# Patient Record
Sex: FEMALE | Race: WHITE | NOT HISPANIC OR LATINO
[De-identification: names, ages, dates, MRNs, and addresses within clinical notes are randomized per-mention and may not be internally consistent; named-entity substitution may affect disease eponyms.]

---

## 2016-03-02 RX ORDER — ACETAMINOPHEN 500 MG
2 TABLET ORAL
Qty: 0 | Refills: 0 | COMMUNITY
Start: 2016-03-02

## 2017-01-09 ENCOUNTER — APPOINTMENT (OUTPATIENT)
Dept: ORTHOPEDIC SURGERY | Facility: CLINIC | Age: 67
End: 2017-01-09

## 2017-01-13 ENCOUNTER — CHART COPY (OUTPATIENT)
Age: 67
End: 2017-01-13

## 2017-01-16 ENCOUNTER — TRANSCRIPTION ENCOUNTER (OUTPATIENT)
Age: 67
End: 2017-01-16

## 2017-01-27 ENCOUNTER — OUTPATIENT (OUTPATIENT)
Dept: OUTPATIENT SERVICES | Facility: HOSPITAL | Age: 67
LOS: 1 days | End: 2017-01-27
Payer: MEDICARE

## 2017-01-27 VITALS
SYSTOLIC BLOOD PRESSURE: 132 MMHG | OXYGEN SATURATION: 98 % | WEIGHT: 227.08 LBS | DIASTOLIC BLOOD PRESSURE: 88 MMHG | HEART RATE: 89 BPM | TEMPERATURE: 98 F | HEIGHT: 63.75 IN | RESPIRATION RATE: 14 BRPM

## 2017-01-27 DIAGNOSIS — Z01.818 ENCOUNTER FOR OTHER PREPROCEDURAL EXAMINATION: ICD-10-CM

## 2017-01-27 DIAGNOSIS — M25.562 PAIN IN LEFT KNEE: ICD-10-CM

## 2017-01-27 DIAGNOSIS — T84.018A BROKEN INTERNAL JOINT PROSTHESIS, OTHER SITE, INITIAL ENCOUNTER: ICD-10-CM

## 2017-01-27 DIAGNOSIS — M19.90 UNSPECIFIED OSTEOARTHRITIS, UNSPECIFIED SITE: ICD-10-CM

## 2017-01-27 DIAGNOSIS — Z96.653 PRESENCE OF ARTIFICIAL KNEE JOINT, BILATERAL: ICD-10-CM

## 2017-01-27 DIAGNOSIS — Z98.89 OTHER SPECIFIED POSTPROCEDURAL STATES: Chronic | ICD-10-CM

## 2017-01-27 DIAGNOSIS — Z96.659 PRESENCE OF UNSPECIFIED ARTIFICIAL KNEE JOINT: Chronic | ICD-10-CM

## 2017-01-27 LAB
ALBUMIN SERPL ELPH-MCNC: 3.8 G/DL — SIGNIFICANT CHANGE UP (ref 3.3–5)
ALP SERPL-CCNC: 73 U/L — SIGNIFICANT CHANGE UP (ref 30–120)
ALT FLD-CCNC: 7 U/L DA — LOW (ref 10–60)
ANION GAP SERPL CALC-SCNC: 3 MMOL/L — LOW (ref 5–17)
APTT BLD: 34.9 SEC — SIGNIFICANT CHANGE UP (ref 27.5–37.4)
AST SERPL-CCNC: 12 U/L — SIGNIFICANT CHANGE UP (ref 10–40)
BASOPHILS # BLD AUTO: 0.1 K/UL — SIGNIFICANT CHANGE UP (ref 0–0.2)
BASOPHILS NFR BLD AUTO: 0.8 % — SIGNIFICANT CHANGE UP (ref 0–2)
BILIRUB SERPL-MCNC: 0.3 MG/DL — SIGNIFICANT CHANGE UP (ref 0.2–1.2)
BLD GP AB SCN SERPL QL: SIGNIFICANT CHANGE UP
BUN SERPL-MCNC: 26 MG/DL — HIGH (ref 7–23)
CALCIUM SERPL-MCNC: 10.3 MG/DL — SIGNIFICANT CHANGE UP (ref 8.4–10.5)
CHLORIDE SERPL-SCNC: 103 MMOL/L — SIGNIFICANT CHANGE UP (ref 96–108)
CO2 SERPL-SCNC: 30 MMOL/L — SIGNIFICANT CHANGE UP (ref 22–31)
CREAT SERPL-MCNC: 1.14 MG/DL — SIGNIFICANT CHANGE UP (ref 0.5–1.3)
EOSINOPHIL # BLD AUTO: 0.1 K/UL — SIGNIFICANT CHANGE UP (ref 0–0.5)
EOSINOPHIL NFR BLD AUTO: 0.9 % — SIGNIFICANT CHANGE UP (ref 0–6)
GLUCOSE SERPL-MCNC: 98 MG/DL — SIGNIFICANT CHANGE UP (ref 70–99)
HCT VFR BLD CALC: 34 % — LOW (ref 34.5–45)
HGB BLD-MCNC: 11.5 G/DL — SIGNIFICANT CHANGE UP (ref 11.5–15.5)
INR BLD: 1.19 RATIO — HIGH (ref 0.88–1.16)
LYMPHOCYTES # BLD AUTO: 1.6 K/UL — SIGNIFICANT CHANGE UP (ref 1–3.3)
LYMPHOCYTES # BLD AUTO: 15.4 % — SIGNIFICANT CHANGE UP (ref 13–44)
MCHC RBC-ENTMCNC: 31.4 PG — SIGNIFICANT CHANGE UP (ref 27–34)
MCHC RBC-ENTMCNC: 33.7 GM/DL — SIGNIFICANT CHANGE UP (ref 32–36)
MCV RBC AUTO: 93.2 FL — SIGNIFICANT CHANGE UP (ref 80–100)
MONOCYTES # BLD AUTO: 0.7 K/UL — SIGNIFICANT CHANGE UP (ref 0–0.9)
MONOCYTES NFR BLD AUTO: 6.5 % — SIGNIFICANT CHANGE UP (ref 2–14)
MRSA PCR RESULT.: SIGNIFICANT CHANGE UP
NEUTROPHILS # BLD AUTO: 7.8 K/UL — HIGH (ref 1.8–7.4)
NEUTROPHILS NFR BLD AUTO: 76.4 % — SIGNIFICANT CHANGE UP (ref 43–77)
PLATELET # BLD AUTO: 355 K/UL — SIGNIFICANT CHANGE UP (ref 150–400)
POTASSIUM SERPL-MCNC: 4.5 MMOL/L — SIGNIFICANT CHANGE UP (ref 3.5–5.3)
POTASSIUM SERPL-SCNC: 4.5 MMOL/L — SIGNIFICANT CHANGE UP (ref 3.5–5.3)
PROT SERPL-MCNC: 8.1 G/DL — SIGNIFICANT CHANGE UP (ref 6–8.3)
PROTHROM AB SERPL-ACNC: 13.3 SEC — HIGH (ref 10–13.1)
RBC # BLD: 3.65 M/UL — LOW (ref 3.8–5.2)
RBC # FLD: 14 % — SIGNIFICANT CHANGE UP (ref 10.3–14.5)
S AUREUS DNA NOSE QL NAA+PROBE: SIGNIFICANT CHANGE UP
SODIUM SERPL-SCNC: 136 MMOL/L — SIGNIFICANT CHANGE UP (ref 135–145)
WBC # BLD: 10.3 K/UL — SIGNIFICANT CHANGE UP (ref 3.8–10.5)
WBC # FLD AUTO: 10.3 K/UL — SIGNIFICANT CHANGE UP (ref 3.8–10.5)

## 2017-01-27 PROCEDURE — 85730 THROMBOPLASTIN TIME PARTIAL: CPT

## 2017-01-27 PROCEDURE — 86900 BLOOD TYPING SEROLOGIC ABO: CPT

## 2017-01-27 PROCEDURE — G0463: CPT

## 2017-01-27 PROCEDURE — 85610 PROTHROMBIN TIME: CPT

## 2017-01-27 PROCEDURE — 87640 STAPH A DNA AMP PROBE: CPT

## 2017-01-27 PROCEDURE — 93010 ELECTROCARDIOGRAM REPORT: CPT | Mod: NC

## 2017-01-27 PROCEDURE — 86850 RBC ANTIBODY SCREEN: CPT

## 2017-01-27 PROCEDURE — 80053 COMPREHEN METABOLIC PANEL: CPT

## 2017-01-27 PROCEDURE — 85027 COMPLETE CBC AUTOMATED: CPT

## 2017-01-27 PROCEDURE — 86901 BLOOD TYPING SEROLOGIC RH(D): CPT

## 2017-01-27 PROCEDURE — 36415 COLL VENOUS BLD VENIPUNCTURE: CPT

## 2017-01-27 PROCEDURE — 93005 ELECTROCARDIOGRAM TRACING: CPT

## 2017-01-27 NOTE — H&P PST ADULT - HISTORY OF PRESENT ILLNESS
66 Y.o . female presents here w/ long standing hx. of left knee pain 10/10 had Left knee replacement in February 2016 and subsequent pain w/ stiffness had xray and mri and was told needed revision.  w/ decreased R.O.M., ambulation, mobility and ADL function. Seen by  and referred for surgery. P.T. no help.  Takes oxycodene for pain.

## 2017-01-27 NOTE — H&P PST ADULT - ASSESSMENT
NPO after Midnight. Take pepcid as ordered.  Nose cx instructions reviewed. Dietary instructions reviewed. 3day wipes reviewed. D/C instructions reviewed.  Patient advised of no jewelry day of surgery.  Handouts given.  Medication reviewed. Stop NSAIDS, ASA herbals, vit E per PMD instructions or 7 days prior to surgery .  Medical Clearance to be obtained.  CLYDE RITTER left knee  Anesthesia and Pharmacy consult done. Day of Surgery you can take the following meds with a small sip of water. Pramipexole, carbidopa, levothyroxine, oxycodone, tylenol

## 2017-01-27 NOTE — H&P PST ADULT - NSANTHOSAYNRD_GEN_A_CORE
No. EVERARDO screening performed.  STOP BANG Legend: 0-2 = LOW Risk; 3-4 = INTERMEDIATE Risk; 5-8 = HIGH Risk

## 2017-01-27 NOTE — H&P PST ADULT - PMH
Hyperlipidemia    Hypothyroid    Morbid obesity    Osteoarthritis    Parkinsons disease  diagnosed 5 years ago

## 2017-01-27 NOTE — H&P PST ADULT - FAMILY HISTORY
Father  Still living? Unknown  Family history of heart disease, Age at diagnosis: Age Unknown     Mother  Still living? No  Family history of heart disease, Age at diagnosis: Age Unknown

## 2017-01-27 NOTE — H&P PST ADULT - VISION (WITH CORRECTIVE LENSES IF THE PATIENT USUALLY WEARS THEM):
+ glasses/Partially impaired: cannot see medication labels or newsprint, but can see obstacles in path, and the surrounding layout; can count fingers at arm's length

## 2017-02-10 ENCOUNTER — CHART COPY (OUTPATIENT)
Age: 67
End: 2017-02-10

## 2017-02-13 RX ORDER — CEFAZOLIN SODIUM 1 G
2000 VIAL (EA) INJECTION ONCE
Qty: 0 | Refills: 0 | Status: COMPLETED | OUTPATIENT
Start: 2017-02-15 | End: 2017-02-15

## 2017-02-13 RX ORDER — SODIUM CHLORIDE 9 MG/ML
1000 INJECTION, SOLUTION INTRAVENOUS
Qty: 0 | Refills: 0 | Status: DISCONTINUED | OUTPATIENT
Start: 2017-02-15 | End: 2017-02-15

## 2017-02-14 ENCOUNTER — RESULT REVIEW (OUTPATIENT)
Age: 67
End: 2017-02-14

## 2017-02-14 RX ORDER — APREPITANT 80 MG/1
40 CAPSULE ORAL ONCE
Qty: 0 | Refills: 0 | Status: COMPLETED | OUTPATIENT
Start: 2017-02-15 | End: 2017-02-15

## 2017-02-14 RX ORDER — FAMOTIDINE 10 MG/ML
0 INJECTION INTRAVENOUS
Qty: 0 | Refills: 0 | COMMUNITY
Start: 2017-02-14 | End: 2017-02-15

## 2017-02-14 RX ORDER — ACETAMINOPHEN 500 MG
1000 TABLET ORAL ONCE
Qty: 0 | Refills: 0 | Status: COMPLETED | OUTPATIENT
Start: 2017-02-15 | End: 2017-02-15

## 2017-02-15 ENCOUNTER — TRANSCRIPTION ENCOUNTER (OUTPATIENT)
Age: 67
End: 2017-02-15

## 2017-02-15 ENCOUNTER — APPOINTMENT (OUTPATIENT)
Dept: ORTHOPEDIC SURGERY | Facility: HOSPITAL | Age: 67
End: 2017-02-15

## 2017-02-15 ENCOUNTER — INPATIENT (INPATIENT)
Facility: HOSPITAL | Age: 67
LOS: 6 days | Discharge: HOME HEALTH | DRG: 464 | End: 2017-02-22
Attending: ORTHOPAEDIC SURGERY | Admitting: ORTHOPAEDIC SURGERY
Payer: MEDICARE

## 2017-02-15 VITALS
WEIGHT: 223.99 LBS | SYSTOLIC BLOOD PRESSURE: 137 MMHG | TEMPERATURE: 98 F | OXYGEN SATURATION: 100 % | DIASTOLIC BLOOD PRESSURE: 72 MMHG | RESPIRATION RATE: 15 BRPM | HEART RATE: 95 BPM | HEIGHT: 63 IN

## 2017-02-15 DIAGNOSIS — Z98.89 OTHER SPECIFIED POSTPROCEDURAL STATES: Chronic | ICD-10-CM

## 2017-02-15 DIAGNOSIS — M25.562 PAIN IN LEFT KNEE: ICD-10-CM

## 2017-02-15 DIAGNOSIS — Z96.653 PRESENCE OF ARTIFICIAL KNEE JOINT, BILATERAL: ICD-10-CM

## 2017-02-15 DIAGNOSIS — Z96.659 PRESENCE OF UNSPECIFIED ARTIFICIAL KNEE JOINT: Chronic | ICD-10-CM

## 2017-02-15 LAB
HCT VFR BLD CALC: 32 % — LOW (ref 34.5–45)
HGB BLD-MCNC: 10.1 G/DL — LOW (ref 11.5–15.5)
MCHC RBC-ENTMCNC: 30.4 PG — SIGNIFICANT CHANGE UP (ref 27–34)
MCHC RBC-ENTMCNC: 31.6 GM/DL — LOW (ref 32–36)
MCV RBC AUTO: 96.5 FL — SIGNIFICANT CHANGE UP (ref 80–100)
PLATELET # BLD AUTO: 363 K/UL — SIGNIFICANT CHANGE UP (ref 150–400)
RBC # BLD: 3.32 M/UL — LOW (ref 3.8–5.2)
RBC # FLD: 13.8 % — SIGNIFICANT CHANGE UP (ref 10.3–14.5)
WBC # BLD: 13.7 K/UL — HIGH (ref 3.8–10.5)
WBC # FLD AUTO: 13.7 K/UL — HIGH (ref 3.8–10.5)

## 2017-02-15 PROCEDURE — 27488 REMOVAL OF KNEE PROSTHESIS: CPT | Mod: LT

## 2017-02-15 PROCEDURE — 88305 TISSUE EXAM BY PATHOLOGIST: CPT | Mod: 26

## 2017-02-15 PROCEDURE — 73562 X-RAY EXAM OF KNEE 3: CPT | Mod: 26,LT

## 2017-02-15 PROCEDURE — 88300 SURGICAL PATH GROSS: CPT | Mod: 26

## 2017-02-15 PROCEDURE — 99222 1ST HOSP IP/OBS MODERATE 55: CPT

## 2017-02-15 PROCEDURE — 27488 REMOVAL OF KNEE PROSTHESIS: CPT | Mod: 82,LT

## 2017-02-15 PROCEDURE — 88311 DECALCIFY TISSUE: CPT | Mod: 26

## 2017-02-15 PROCEDURE — 88108 CYTOPATH CONCENTRATE TECH: CPT | Mod: 26

## 2017-02-15 RX ORDER — HYDROMORPHONE HYDROCHLORIDE 2 MG/ML
0.5 INJECTION INTRAMUSCULAR; INTRAVENOUS; SUBCUTANEOUS
Qty: 0 | Refills: 0 | Status: DISCONTINUED | OUTPATIENT
Start: 2017-02-15 | End: 2017-02-15

## 2017-02-15 RX ORDER — CARBIDOPA AND LEVODOPA 25; 100 MG/1; MG/1
1 TABLET ORAL THREE TIMES A DAY
Qty: 0 | Refills: 0 | Status: DISCONTINUED | OUTPATIENT
Start: 2017-02-15 | End: 2017-02-15

## 2017-02-15 RX ORDER — SODIUM CHLORIDE 9 MG/ML
1000 INJECTION, SOLUTION INTRAVENOUS
Qty: 0 | Refills: 0 | Status: DISCONTINUED | OUTPATIENT
Start: 2017-02-15 | End: 2017-02-15

## 2017-02-15 RX ORDER — CELECOXIB 200 MG/1
200 CAPSULE ORAL ONCE
Qty: 0 | Refills: 0 | Status: COMPLETED | OUTPATIENT
Start: 2017-02-15 | End: 2017-02-15

## 2017-02-15 RX ORDER — APIXABAN 2.5 MG/1
2.5 TABLET, FILM COATED ORAL ONCE
Qty: 0 | Refills: 0 | Status: COMPLETED | OUTPATIENT
Start: 2017-02-16 | End: 2017-02-16

## 2017-02-15 RX ORDER — POLYETHYLENE GLYCOL 3350 17 G/17G
17 POWDER, FOR SOLUTION ORAL DAILY
Qty: 0 | Refills: 0 | Status: DISCONTINUED | OUTPATIENT
Start: 2017-02-15 | End: 2017-02-22

## 2017-02-15 RX ORDER — SENNA PLUS 8.6 MG/1
2 TABLET ORAL AT BEDTIME
Qty: 0 | Refills: 0 | Status: DISCONTINUED | OUTPATIENT
Start: 2017-02-15 | End: 2017-02-22

## 2017-02-15 RX ORDER — OXYCODONE HYDROCHLORIDE 5 MG/1
10 TABLET ORAL
Qty: 0 | Refills: 0 | Status: DISCONTINUED | OUTPATIENT
Start: 2017-02-15 | End: 2017-02-20

## 2017-02-15 RX ORDER — OXYCODONE HYDROCHLORIDE 5 MG/1
10 TABLET ORAL ONCE
Qty: 0 | Refills: 0 | Status: DISCONTINUED | OUTPATIENT
Start: 2017-02-15 | End: 2017-02-15

## 2017-02-15 RX ORDER — LEVOTHYROXINE SODIUM 125 MCG
125 TABLET ORAL DAILY
Qty: 0 | Refills: 0 | Status: DISCONTINUED | OUTPATIENT
Start: 2017-02-15 | End: 2017-02-15

## 2017-02-15 RX ORDER — OXYCODONE HYDROCHLORIDE 5 MG/1
5 TABLET ORAL
Qty: 0 | Refills: 0 | Status: DISCONTINUED | OUTPATIENT
Start: 2017-02-15 | End: 2017-02-22

## 2017-02-15 RX ORDER — ACETAMINOPHEN 500 MG
1000 TABLET ORAL EVERY 8 HOURS
Qty: 0 | Refills: 0 | Status: DISCONTINUED | OUTPATIENT
Start: 2017-02-16 | End: 2017-02-22

## 2017-02-15 RX ORDER — ACETAMINOPHEN 500 MG
1000 TABLET ORAL EVERY 6 HOURS
Qty: 0 | Refills: 0 | Status: COMPLETED | OUTPATIENT
Start: 2017-02-15 | End: 2017-02-16

## 2017-02-15 RX ORDER — DOCUSATE SODIUM 100 MG
100 CAPSULE ORAL THREE TIMES A DAY
Qty: 0 | Refills: 0 | Status: DISCONTINUED | OUTPATIENT
Start: 2017-02-15 | End: 2017-02-15

## 2017-02-15 RX ORDER — APIXABAN 2.5 MG/1
2.5 TABLET, FILM COATED ORAL EVERY 12 HOURS
Qty: 0 | Refills: 0 | Status: DISCONTINUED | OUTPATIENT
Start: 2017-02-17 | End: 2017-02-22

## 2017-02-15 RX ORDER — PRAMIPEXOLE DIHYDROCHLORIDE 0.12 MG/1
0.25 TABLET ORAL THREE TIMES A DAY
Qty: 0 | Refills: 0 | Status: DISCONTINUED | OUTPATIENT
Start: 2017-02-15 | End: 2017-02-22

## 2017-02-15 RX ORDER — EZETIMIBE 10 MG/1
10 TABLET ORAL AT BEDTIME
Qty: 0 | Refills: 0 | Status: DISCONTINUED | OUTPATIENT
Start: 2017-02-15 | End: 2017-02-22

## 2017-02-15 RX ORDER — LEVOTHYROXINE SODIUM 125 MCG
125 TABLET ORAL DAILY
Qty: 0 | Refills: 0 | Status: DISCONTINUED | OUTPATIENT
Start: 2017-02-15 | End: 2017-02-22

## 2017-02-15 RX ORDER — MAGNESIUM HYDROXIDE 400 MG/1
30 TABLET, CHEWABLE ORAL DAILY
Qty: 0 | Refills: 0 | Status: DISCONTINUED | OUTPATIENT
Start: 2017-02-15 | End: 2017-02-22

## 2017-02-15 RX ORDER — TRANEXAMIC ACID 100 MG/ML
1000 INJECTION, SOLUTION INTRAVENOUS ONCE
Qty: 0 | Refills: 0 | Status: DISCONTINUED | OUTPATIENT
Start: 2017-02-15 | End: 2017-02-15

## 2017-02-15 RX ORDER — CEFTRIAXONE 500 MG/1
1 INJECTION, POWDER, FOR SOLUTION INTRAMUSCULAR; INTRAVENOUS EVERY 24 HOURS
Qty: 0 | Refills: 0 | Status: DISCONTINUED | OUTPATIENT
Start: 2017-02-15 | End: 2017-02-19

## 2017-02-15 RX ORDER — VANCOMYCIN HCL 1 G
1500 VIAL (EA) INTRAVENOUS EVERY 12 HOURS
Qty: 0 | Refills: 0 | Status: DISCONTINUED | OUTPATIENT
Start: 2017-02-16 | End: 2017-02-19

## 2017-02-15 RX ORDER — ONDANSETRON 8 MG/1
4 TABLET, FILM COATED ORAL EVERY 6 HOURS
Qty: 0 | Refills: 0 | Status: DISCONTINUED | OUTPATIENT
Start: 2017-02-15 | End: 2017-02-22

## 2017-02-15 RX ORDER — PANTOPRAZOLE SODIUM 20 MG/1
40 TABLET, DELAYED RELEASE ORAL DAILY
Qty: 0 | Refills: 0 | Status: DISCONTINUED | OUTPATIENT
Start: 2017-02-15 | End: 2017-02-22

## 2017-02-15 RX ORDER — DOCUSATE SODIUM 100 MG
100 CAPSULE ORAL THREE TIMES A DAY
Qty: 0 | Refills: 0 | Status: DISCONTINUED | OUTPATIENT
Start: 2017-02-15 | End: 2017-02-22

## 2017-02-15 RX ORDER — HYDROMORPHONE HYDROCHLORIDE 2 MG/ML
1 INJECTION INTRAMUSCULAR; INTRAVENOUS; SUBCUTANEOUS
Qty: 0 | Refills: 0 | Status: DISCONTINUED | OUTPATIENT
Start: 2017-02-15 | End: 2017-02-15

## 2017-02-15 RX ORDER — SODIUM CHLORIDE 9 MG/ML
1000 INJECTION, SOLUTION INTRAVENOUS
Qty: 0 | Refills: 0 | Status: DISCONTINUED | OUTPATIENT
Start: 2017-02-15 | End: 2017-02-17

## 2017-02-15 RX ORDER — VANCOMYCIN HCL 1 G
1500 VIAL (EA) INTRAVENOUS EVERY 12 HOURS
Qty: 0 | Refills: 0 | Status: COMPLETED | OUTPATIENT
Start: 2017-02-15 | End: 2017-02-15

## 2017-02-15 RX ORDER — ONDANSETRON 8 MG/1
4 TABLET, FILM COATED ORAL ONCE
Qty: 0 | Refills: 0 | Status: DISCONTINUED | OUTPATIENT
Start: 2017-02-15 | End: 2017-02-15

## 2017-02-15 RX ORDER — CARBIDOPA AND LEVODOPA 25; 100 MG/1; MG/1
1 TABLET ORAL THREE TIMES A DAY
Qty: 0 | Refills: 0 | Status: DISCONTINUED | OUTPATIENT
Start: 2017-02-15 | End: 2017-02-22

## 2017-02-15 RX ADMIN — CEFTRIAXONE 100 GRAM(S): 500 INJECTION, POWDER, FOR SOLUTION INTRAMUSCULAR; INTRAVENOUS at 20:20

## 2017-02-15 RX ADMIN — HYDROMORPHONE HYDROCHLORIDE 0.5 MILLIGRAM(S): 2 INJECTION INTRAMUSCULAR; INTRAVENOUS; SUBCUTANEOUS at 23:30

## 2017-02-15 RX ADMIN — SODIUM CHLORIDE 75 MILLILITER(S): 9 INJECTION, SOLUTION INTRAVENOUS at 10:41

## 2017-02-15 RX ADMIN — HYDROMORPHONE HYDROCHLORIDE 0.5 MILLIGRAM(S): 2 INJECTION INTRAMUSCULAR; INTRAVENOUS; SUBCUTANEOUS at 23:15

## 2017-02-15 RX ADMIN — OXYCODONE HYDROCHLORIDE 10 MILLIGRAM(S): 5 TABLET ORAL at 11:21

## 2017-02-15 RX ADMIN — Medication 300 MILLIGRAM(S): at 17:57

## 2017-02-15 RX ADMIN — Medication 400 MILLIGRAM(S): at 20:24

## 2017-02-15 RX ADMIN — CELECOXIB 200 MILLIGRAM(S): 200 CAPSULE ORAL at 11:22

## 2017-02-15 RX ADMIN — CARBIDOPA AND LEVODOPA 1 TABLET(S): 25; 100 TABLET ORAL at 22:15

## 2017-02-15 RX ADMIN — HYDROMORPHONE HYDROCHLORIDE 1 MILLIGRAM(S): 2 INJECTION INTRAMUSCULAR; INTRAVENOUS; SUBCUTANEOUS at 19:00

## 2017-02-15 RX ADMIN — PRAMIPEXOLE DIHYDROCHLORIDE 0.25 MILLIGRAM(S): 0.12 TABLET ORAL at 22:15

## 2017-02-15 RX ADMIN — APREPITANT 40 MILLIGRAM(S): 80 CAPSULE ORAL at 11:22

## 2017-02-15 RX ADMIN — Medication 1000 MILLIGRAM(S): at 20:20

## 2017-02-15 RX ADMIN — HYDROMORPHONE HYDROCHLORIDE 1 MILLIGRAM(S): 2 INJECTION INTRAMUSCULAR; INTRAVENOUS; SUBCUTANEOUS at 18:35

## 2017-02-15 RX ADMIN — Medication 100 MILLIGRAM(S): at 22:15

## 2017-02-15 RX ADMIN — SODIUM CHLORIDE 100 MILLILITER(S): 9 INJECTION, SOLUTION INTRAVENOUS at 17:00

## 2017-02-15 NOTE — DISCHARGE NOTE ADULT - ADDITIONAL INSTRUCTIONS
Follow up with Dr. Kaur on 3/7/17 at 16:00  Follow up with Dr. Rodriguez in 3/4 weeks   Follow up with PCP 2-3 weeks

## 2017-02-15 NOTE — DISCHARGE NOTE ADULT - FUNCTIONAL SCREEN CURRENT LEVEL: AMBULATION, MLM
(1) assistive equipment (4) completely dependent (3) assistive equipment and person (2) assistive person

## 2017-02-15 NOTE — DISCHARGE NOTE ADULT - MEDICATION SUMMARY - MEDICATIONS TO TAKE
I will START or STAY ON the medications listed below when I get home from the hospital:    acetaminophen 500 mg oral tablet  -- 2 tab(s) by mouth every 8 hours  -- Indication: For Pain    oxyCODONE 5 mg oral tablet  -- 1 tab(s) by mouth every 4 hours, As Needed, Mild-mod Pain; 2 tabs by mouth every 4 hours as needed mod-severe pain MDD:6  -- Indication: For Pain    apixaban 2.5 mg oral tablet  -- 1 tab(s) by mouth every 12 hours  -- Indication: For Prevention of blood clots    Zetia 10 mg oral tablet  -- 1 tab(s) by mouth once a day  -- Indication: For high cholesterol     pramipexole 0.25 mg oral tablet  -- 1 tab(s) by mouth 3 times a day  -- Indication: For Parkinson's Disease    carbidopa-levodopa 25 mg-100 mg oral tablet  -- 1 tab(s) by mouth 3 times a day  -- Indication: For Parkinson's Disease    hydrochlorothiazide 25 mg oral tablet  -- 1 tab(s) by mouth once a day  -- Indication: For high blood pressure    docusate sodium 100 mg oral capsule  -- 1 cap(s) by mouth 3 times a day  -- Indication: For stool softener    senna oral tablet  -- 2 tab(s) by mouth once a day (at bedtime), As needed, Constipation  -- Indication: For constipation    polyethylene glycol 3350 oral powder for reconstitution  -- 17 gram(s) by mouth once a day  -- Indication: For constipation    nafcillin 2 g injectable powder for injection  -- 2g IVPB Q4H via CAD pump or similar; stop date 3/29/17;  flushes per protocol; CBC/DIFF CMP on 2/27, 3/6, 3/13, 3/20, 3/27/17  -- Indication: For Infection     lactobacillus acidophilus oral capsule  -- 1  by mouth 3 times a day  -- Indication: For Prevention of GI infection    pantoprazole 40 mg oral delayed release tablet  -- 1 tab(s) by mouth once a day  -- Indication: For Prevention of ulcer    levothyroxine 125 mcg (0.125 mg) oral tablet  -- 1 tab(s) by mouth once a day  -- Indication: For thyroid    levothyroxine 125 mcg (0.125 mg) oral capsule  -- 1 cap(s) by mouth once a day  -- Indication: For thyroid    Vitamin D3 2000 intl units oral capsule  -- 1 cap(s) by mouth once a day  -- Indication: For supplement I will START or STAY ON the medications listed below when I get home from the hospital:    acetaminophen 500 mg oral tablet  -- 2 tab(s) by mouth every 8 hours  -- Indication: For Pain    oxyCODONE 5 mg oral tablet  -- 1 tab(s) by mouth every 4 hours, As Needed, Mild-mod Pain; 2 tabs by mouth every 4 hours as needed mod-severe pain MDD:6  -- Indication: For Pain    Ecotrin 325 mg oral delayed release tablet  -- 1 tab(s) by mouth 2 times a day; start after Eliquis is completed  -- Swallow whole.  Do not crush.  Take with food or milk.    -- Indication: For Proph    apixaban 2.5 mg oral tablet  -- 1 tab(s) by mouth every 12 hours  -- Indication: For Prevention of blood clots    Zetia 10 mg oral tablet  -- 1 tab(s) by mouth once a day  -- Indication: For high cholesterol     pramipexole 0.25 mg oral tablet  -- 1 tab(s) by mouth 3 times a day  -- Indication: For Parkinson's Disease    carbidopa-levodopa 25 mg-100 mg oral tablet  -- 1 tab(s) by mouth 3 times a day  -- Indication: For Parkinson's Disease    hydrochlorothiazide 25 mg oral tablet  -- 1 tab(s) by mouth once a day  -- Indication: For high blood pressure    docusate sodium 100 mg oral capsule  -- 1 cap(s) by mouth 3 times a day  -- Indication: For stool softener    senna oral tablet  -- 2 tab(s) by mouth once a day (at bedtime), As needed, Constipation  -- Indication: For constipation    polyethylene glycol 3350 oral powder for reconstitution  -- 17 gram(s) by mouth once a day  -- Indication: For constipation    nafcillin 2 g injectable powder for injection  -- 2g IVPB Q4H via CAD pump or similar; stop date 3/29/17;  flushes per protocol; CBC/DIFF CMP on 2/27, 3/6, 3/13, 3/20, 3/27/17  -- Indication: For Infection     lactobacillus acidophilus oral capsule  -- 1  by mouth 3 times a day  -- Indication: For Prevention of GI infection    pantoprazole 40 mg oral delayed release tablet  -- 1 tab(s) by mouth once a day  -- Indication: For Prevention of ulcer    levothyroxine 125 mcg (0.125 mg) oral capsule  -- 1 cap(s) by mouth once a day  -- Indication: For thyroid    Vitamin D3 2000 intl units oral capsule  -- 1 cap(s) by mouth once a day  -- Indication: For supplement

## 2017-02-15 NOTE — DISCHARGE NOTE ADULT - HOME CARE AGENCY
[facility] [place] [phone] for skilled nurse; PT and aide eval; [facility] [phone] for IV ABX to start at 6PM today.

## 2017-02-15 NOTE — PHYSICAL THERAPY INITIAL EVALUATION ADULT - RANGE OF MOTION EXAMINATION, REHAB EVAL
L knee NT due to knee immobilizer/Right LE ROM was WFL (within functional limits)/bilateral upper extremity ROM was WFL (within functional limits)/deficits as listed below

## 2017-02-15 NOTE — DISCHARGE NOTE ADULT - MEDICATION SUMMARY - MEDICATIONS TO STOP TAKING
I will STOP taking the medications listed below when I get home from the hospital:    Fish Oil 1200 mg oral capsule  --  by mouth once a day    glucosamine hydrochloride 1500 mg oral tablet  -- 1 tab(s) by mouth once a day    Pepcid 20 mg oral tablet  --  by mouth twice pre op

## 2017-02-15 NOTE — PHYSICAL THERAPY INITIAL EVALUATION ADULT - ADDITIONAL COMMENTS
Pt lives with  in a house w/ 1 step to enter and no steps inside.   Pt has a rolling walker and straight cane

## 2017-02-15 NOTE — DISCHARGE NOTE ADULT - CARE PLAN
Principal Discharge DX:	Infection of bone of lower leg  Goal:	Improve ambulation, ADLs and quality of life  Instructions for follow-up, activity and diet:	Physical Therapy/Occupational Therapy for ambulation, transfers, stairs, ADL's, Range of Motion Exercises, Isometrics.  Full weight bearing as tolerated with rolling walker  Range of Motion Goals: Keep knee in the bled wu brace locked in extension   Keep incision clean and dry.  Suture/prineo dressing removal 14 days after surgery at rehab facility or Surgeon's office  May shower post-op day #5 if no drainage from incision  Goal:	knee infection  Instructions for follow-up, activity and diet:	Continue IV Nafcillin until 3/29/17 via PICC line   Obtain weekly, CBC, BMP  Follow up with  in 3-4 weeks.

## 2017-02-15 NOTE — PHYSICAL THERAPY INITIAL EVALUATION ADULT - GAIT TRAINING, PT EVAL
Goals 3-5 sessions, Pt will ambulate 75 ft w/ rolling walker and LLE WBAT in knee immobilizer independently

## 2017-02-15 NOTE — DISCHARGE NOTE ADULT - INSTRUCTIONS
For Constipation :   • Increase your water intake. Drink at least 8 glasses of water daily.  • Try adding fiber to your diet by eating fruits, vegetables and foods that are rich in grains.  • If you do experience constipation, you may take an over-the-counter stool softener/laxative such as Esther Colace, Senekot or  Milk of Magnesia.

## 2017-02-15 NOTE — DISCHARGE NOTE ADULT - NS AS ACTIVITY OBS
No Heavy lifting/straining/Walking-Indoors allowed/Do not drive or operate machinery/Walking-Outdoors allowed

## 2017-02-15 NOTE — DISCHARGE NOTE ADULT - CARE PROVIDERS DIRECT ADDRESSES
,DirectAddress_Unknown,DirectAddress_Unknown,[email or web address]

## 2017-02-15 NOTE — DISCHARGE NOTE ADULT - PATIENT PORTAL LINK FT
“You can access the FollowHealth Patient Portal, offered by [facility], by registering with the following website: [email or web address]”

## 2017-02-15 NOTE — DISCHARGE NOTE ADULT - PROVIDER TOKENS
TOKEN:'[id number]',FREE:[LAST:[Mina Kaur MD],PHONE:[(   )    -],FAX:[(   )    -],ADDRESS:[Mina Kaur MD  [address] [place] [address],   [facility] [address]  [place] [address]  Tel: [phone]  Fax: [phone]]]

## 2017-02-15 NOTE — DISCHARGE NOTE ADULT - VISION (WITH CORRECTIVE LENSES IF THE PATIENT USUALLY WEARS THEM):
Partially impaired: cannot see medication labels or newsprint, but can see obstacles in path, and the surrounding layout; can count fingers at arm's length/+ glasses

## 2017-02-15 NOTE — DISCHARGE NOTE ADULT - CARE PROVIDER_API CALL
Sergio Rodriguez (MD), Infectious Disease; Internal Medicine  [address] [facility] [address]  [place] [address]  Phone: [phone]  Fax: [phone]    Mina Kaur MD,   Mina Kaur MD  [address] [facility],   [address]  [place] [address]  Tel: [phone]  Fax: [phone]  Phone: (   )    -  Fax: (   )    -

## 2017-02-15 NOTE — DISCHARGE NOTE ADULT - PLAN OF CARE
Improve ambulation, ADLs and quality of life Physical Therapy/Occupational Therapy for ambulation, transfers, stairs, ADL's, Range of Motion Exercises, Isometrics.  Full weight bearing as tolerated with rolling walker  Range of Motion Goals: Keep knee in the bled wu brace locked in extension   Keep incision clean and dry.  Suture/prineo dressing removal 14 days after surgery at rehab facility or Surgeon's office  May shower post-op day #5 if no drainage from incision knee infection Continue IV Nafcillin until 3/29/17 via PICC line   Obtain weekly, CBC, BMP  Follow up with  in 3-4 weeks.

## 2017-02-15 NOTE — DISCHARGE NOTE ADULT - HOSPITAL COURSE
This patient was admitted to [facility] with a history of failed Left TKR.  Patient went to Pre-Surgical Testing at [facility] and was medically cleared to undergo elective procedure. Patient was found to have knee joint infection, all hardware was removal and antibiotic space placed. Intra-Op cultures send out for pathology. Dr.Zelenetz BARAKAT was consulted and followed the patient through out hospital stay.  Surgical cultures grew out staph lugdunensis, sensitive to nafcillin. PICC line was inserted on 3/19/17 to arm arm.  No operative or karis-operative complications arose during patients hospital course.  Patient received antibiotic according to SCIP guidelines for infection prevention. Eliquis was given for DVT prophylaxis.  Anesthesia, Medical Hospitalist, Physical Therapy and Occupational Therapy were consulted. Patient is weight bearing as tolerated with a knee extension brace locked in extension until advised otherwise by Dr. Kaur.  Patient is stable for discharge with a good prognosis.  Appropriate discharge instructions and medications are provided in this document. This patient was admitted to [facility] with a history of failed Left TKR.  Patient went to Pre-Surgical Testing at [facility] and was medically cleared to undergo elective procedure. Patient was found to have knee joint infection, all hardware was removal and antibiotic space placed. Intra-Op cultures send out for pathology. Dr.Zelenetz BARAKAT was consulted and followed the patient through out hospital stay.  Surgical cultures grew out staph lugdunensis, sensitive to nafcillin. PICC line was inserted on 3/19/17 to arm.  No operative or karis-operative complications arose during patients hospital course.  Patient received antibiotic according to SCIP guidelines for infection prevention. Eliquis was given for DVT prophylaxis.  Anesthesia, Medical Hospitalist, Physical Therapy and Occupational Therapy were consulted. Patient is weight bearing as tolerated with a knee extension brace locked in extension until advised otherwise by Dr. Kaur.  Patient is stable for discharge with a good prognosis.  Appropriate discharge instructions and medications are provided in this document. This patient was admitted to [facility] with a history of failed Left TKR.  Patient went to Pre-Surgical Testing at [facility] and was medically cleared to undergo elective procedure. Patient was found to have knee joint infection, all hardware was removal and antibiotic space placed. Intra-Op cultures send out for pathology. Dr.Zelenetz BARAKAT was consulted and followed the patient through out hospital stay.  Surgical cultures grew out staph lugdunensis, sensitive to nafcillin. PICC line was inserted on 3/19/17 to arm.  No operative or karis-operative complications arose during patients hospital course.  Patient received antibiotic according to SCIP guidelines for infection prevention. Eliquis was given for DVT prophylaxis.  Anesthesia, Medical Hospitalist, Physical Therapy and Occupational Therapy were consulted. Patient is weight bearing as tolerated with a knee extension brace locked in extension until advised otherwise by Dr. Kaur.  Patient is stable for discharge with a good prognosis.  Appropriate discharge instructions and medications are provided in this document. Patient needs weekly cbc, and CMP

## 2017-02-15 NOTE — PHYSICAL THERAPY INITIAL EVALUATION ADULT - GAIT DEVIATIONS NOTED, PT EVAL
decreased stride length/decreased weight-shifting ability/decreased sam/decreased step length/decreased velocity of limb motion

## 2017-02-15 NOTE — DISCHARGE NOTE ADULT - REASON FOR ADMISSION
Revision of Left Total Knee Replacement patella with tibial bearing exchange Left knee removal of hardware, antibiotic spacer placement

## 2017-02-16 LAB
ANION GAP SERPL CALC-SCNC: 7 MMOL/L — SIGNIFICANT CHANGE UP (ref 5–17)
BUN SERPL-MCNC: 16 MG/DL — SIGNIFICANT CHANGE UP (ref 7–23)
CALCIUM SERPL-MCNC: 9.6 MG/DL — SIGNIFICANT CHANGE UP (ref 8.4–10.5)
CHLORIDE SERPL-SCNC: 103 MMOL/L — SIGNIFICANT CHANGE UP (ref 96–108)
CO2 SERPL-SCNC: 31 MMOL/L — SIGNIFICANT CHANGE UP (ref 22–31)
CREAT SERPL-MCNC: 0.97 MG/DL — SIGNIFICANT CHANGE UP (ref 0.5–1.3)
GLUCOSE SERPL-MCNC: 108 MG/DL — HIGH (ref 70–99)
HCT VFR BLD CALC: 30.9 % — LOW (ref 34.5–45)
HGB BLD-MCNC: 10 G/DL — LOW (ref 11.5–15.5)
MCHC RBC-ENTMCNC: 32 PG — SIGNIFICANT CHANGE UP (ref 27–34)
MCHC RBC-ENTMCNC: 32.5 GM/DL — SIGNIFICANT CHANGE UP (ref 32–36)
MCV RBC AUTO: 98.2 FL — SIGNIFICANT CHANGE UP (ref 80–100)
PLATELET # BLD AUTO: 346 K/UL — SIGNIFICANT CHANGE UP (ref 150–400)
POTASSIUM SERPL-MCNC: 3.7 MMOL/L — SIGNIFICANT CHANGE UP (ref 3.5–5.3)
POTASSIUM SERPL-SCNC: 3.7 MMOL/L — SIGNIFICANT CHANGE UP (ref 3.5–5.3)
RBC # BLD: 3.14 M/UL — LOW (ref 3.8–5.2)
RBC # FLD: 13.8 % — SIGNIFICANT CHANGE UP (ref 10.3–14.5)
SODIUM SERPL-SCNC: 141 MMOL/L — SIGNIFICANT CHANGE UP (ref 135–145)
WBC # BLD: 10.2 K/UL — SIGNIFICANT CHANGE UP (ref 3.8–10.5)
WBC # FLD AUTO: 10.2 K/UL — SIGNIFICANT CHANGE UP (ref 3.8–10.5)

## 2017-02-16 PROCEDURE — 74000: CPT | Mod: 26

## 2017-02-16 PROCEDURE — 99233 SBSQ HOSP IP/OBS HIGH 50: CPT

## 2017-02-16 RX ORDER — DIPHENHYDRAMINE HCL 50 MG
25 CAPSULE ORAL ONCE
Qty: 0 | Refills: 0 | Status: COMPLETED | OUTPATIENT
Start: 2017-02-16 | End: 2017-02-16

## 2017-02-16 RX ADMIN — PRAMIPEXOLE DIHYDROCHLORIDE 0.25 MILLIGRAM(S): 0.12 TABLET ORAL at 06:17

## 2017-02-16 RX ADMIN — Medication 300 MILLIGRAM(S): at 06:00

## 2017-02-16 RX ADMIN — OXYCODONE HYDROCHLORIDE 10 MILLIGRAM(S): 5 TABLET ORAL at 10:46

## 2017-02-16 RX ADMIN — CARBIDOPA AND LEVODOPA 1 TABLET(S): 25; 100 TABLET ORAL at 22:00

## 2017-02-16 RX ADMIN — OXYCODONE HYDROCHLORIDE 5 MILLIGRAM(S): 5 TABLET ORAL at 06:15

## 2017-02-16 RX ADMIN — OXYCODONE HYDROCHLORIDE 10 MILLIGRAM(S): 5 TABLET ORAL at 14:08

## 2017-02-16 RX ADMIN — Medication 1000 MILLIGRAM(S): at 02:15

## 2017-02-16 RX ADMIN — Medication 1000 MILLIGRAM(S): at 09:15

## 2017-02-16 RX ADMIN — Medication 1000 MILLIGRAM(S): at 21:58

## 2017-02-16 RX ADMIN — Medication 25 MILLIGRAM(S): at 22:00

## 2017-02-16 RX ADMIN — Medication 100 MILLIGRAM(S): at 06:18

## 2017-02-16 RX ADMIN — OXYCODONE HYDROCHLORIDE 5 MILLIGRAM(S): 5 TABLET ORAL at 05:45

## 2017-02-16 RX ADMIN — Medication 400 MILLIGRAM(S): at 02:00

## 2017-02-16 RX ADMIN — CARBIDOPA AND LEVODOPA 1 TABLET(S): 25; 100 TABLET ORAL at 06:17

## 2017-02-16 RX ADMIN — PRAMIPEXOLE DIHYDROCHLORIDE 0.25 MILLIGRAM(S): 0.12 TABLET ORAL at 21:58

## 2017-02-16 RX ADMIN — OXYCODONE HYDROCHLORIDE 10 MILLIGRAM(S): 5 TABLET ORAL at 13:38

## 2017-02-16 RX ADMIN — Medication 300 MILLIGRAM(S): at 17:12

## 2017-02-16 RX ADMIN — POLYETHYLENE GLYCOL 3350 17 GRAM(S): 17 POWDER, FOR SOLUTION ORAL at 13:15

## 2017-02-16 RX ADMIN — Medication 125 MICROGRAM(S): at 04:49

## 2017-02-16 RX ADMIN — OXYCODONE HYDROCHLORIDE 10 MILLIGRAM(S): 5 TABLET ORAL at 19:15

## 2017-02-16 RX ADMIN — EZETIMIBE 10 MILLIGRAM(S): 10 TABLET ORAL at 22:00

## 2017-02-16 RX ADMIN — APIXABAN 2.5 MILLIGRAM(S): 2.5 TABLET, FILM COATED ORAL at 13:15

## 2017-02-16 RX ADMIN — PRAMIPEXOLE DIHYDROCHLORIDE 0.25 MILLIGRAM(S): 0.12 TABLET ORAL at 15:09

## 2017-02-16 RX ADMIN — OXYCODONE HYDROCHLORIDE 10 MILLIGRAM(S): 5 TABLET ORAL at 18:45

## 2017-02-16 RX ADMIN — APIXABAN 2.5 MILLIGRAM(S): 2.5 TABLET, FILM COATED ORAL at 21:59

## 2017-02-16 RX ADMIN — Medication 100 MILLIGRAM(S): at 21:59

## 2017-02-16 RX ADMIN — Medication 10 MILLIGRAM(S): at 21:58

## 2017-02-16 RX ADMIN — Medication 1000 MILLIGRAM(S): at 15:09

## 2017-02-16 RX ADMIN — PANTOPRAZOLE SODIUM 40 MILLIGRAM(S): 20 TABLET, DELAYED RELEASE ORAL at 13:15

## 2017-02-16 RX ADMIN — OXYCODONE HYDROCHLORIDE 10 MILLIGRAM(S): 5 TABLET ORAL at 10:16

## 2017-02-16 RX ADMIN — CEFTRIAXONE 100 GRAM(S): 500 INJECTION, POWDER, FOR SOLUTION INTRAMUSCULAR; INTRAVENOUS at 20:19

## 2017-02-16 RX ADMIN — Medication 100 MILLIGRAM(S): at 15:09

## 2017-02-16 RX ADMIN — Medication 400 MILLIGRAM(S): at 08:43

## 2017-02-16 RX ADMIN — CARBIDOPA AND LEVODOPA 1 TABLET(S): 25; 100 TABLET ORAL at 15:09

## 2017-02-16 NOTE — OCCUPATIONAL THERAPY INITIAL EVALUATION ADULT - ADDITIONAL COMMENTS
Patient lives in a private house with 1 step to enter house, no steps inside  bedroom/bathroom main floor. Patient has a bathtub with curtain. + RW, SAC Patient lives in a private house with 1 step to enter house, no steps inside  bedroom/bathroom main floor. Patient has a bathtub with curtain. + RW, SAC, RTS, commode, Patient lives in a private house with 1 step to enter house, no steps inside  bedroom/bathroom main floor. Patient has a bathtub with curtain. + RW, SAC, RTS, commode, electric recliner chair,

## 2017-02-16 NOTE — OCCUPATIONAL THERAPY INITIAL EVALUATION ADULT - PERTINENT HX OF CURRENT PROBLEM, REHAB EVAL
Left TKR revision with cement spacer placement.   Pt with infected TKR and loose patella (h/o Left TKR 2/24/16 & Right TKR 2/29/16)

## 2017-02-16 NOTE — OCCUPATIONAL THERAPY INITIAL EVALUATION ADULT - RANGE OF MOTION EXAMINATION, UPPER EXTREMITY
bilateral UE Active Assistive ROM was WNL (within normal limits) bilateral UE Active ROM was WFL  (within functional limits)

## 2017-02-17 LAB
ANION GAP SERPL CALC-SCNC: 9 MMOL/L — SIGNIFICANT CHANGE UP (ref 5–17)
BUN SERPL-MCNC: 14 MG/DL — SIGNIFICANT CHANGE UP (ref 7–23)
CALCIUM SERPL-MCNC: 9.7 MG/DL — SIGNIFICANT CHANGE UP (ref 8.4–10.5)
CHLORIDE SERPL-SCNC: 105 MMOL/L — SIGNIFICANT CHANGE UP (ref 96–108)
CO2 SERPL-SCNC: 29 MMOL/L — SIGNIFICANT CHANGE UP (ref 22–31)
CREAT SERPL-MCNC: 1 MG/DL — SIGNIFICANT CHANGE UP (ref 0.5–1.3)
GLUCOSE SERPL-MCNC: 114 MG/DL — HIGH (ref 70–99)
HCT VFR BLD CALC: 29.8 % — LOW (ref 34.5–45)
HGB BLD-MCNC: 9.6 G/DL — LOW (ref 11.5–15.5)
MCHC RBC-ENTMCNC: 31.3 PG — SIGNIFICANT CHANGE UP (ref 27–34)
MCHC RBC-ENTMCNC: 32.1 GM/DL — SIGNIFICANT CHANGE UP (ref 32–36)
MCV RBC AUTO: 97.6 FL — SIGNIFICANT CHANGE UP (ref 80–100)
PLATELET # BLD AUTO: 364 K/UL — SIGNIFICANT CHANGE UP (ref 150–400)
POTASSIUM SERPL-MCNC: 4.1 MMOL/L — SIGNIFICANT CHANGE UP (ref 3.5–5.3)
POTASSIUM SERPL-SCNC: 4.1 MMOL/L — SIGNIFICANT CHANGE UP (ref 3.5–5.3)
RBC # BLD: 3.05 M/UL — LOW (ref 3.8–5.2)
RBC # FLD: 13.8 % — SIGNIFICANT CHANGE UP (ref 10.3–14.5)
SODIUM SERPL-SCNC: 143 MMOL/L — SIGNIFICANT CHANGE UP (ref 135–145)
VANCOMYCIN TROUGH SERPL-MCNC: 14.5 UG/ML — SIGNIFICANT CHANGE UP (ref 10–20)
WBC # BLD: 9.8 K/UL — SIGNIFICANT CHANGE UP (ref 3.8–10.5)
WBC # FLD AUTO: 9.8 K/UL — SIGNIFICANT CHANGE UP (ref 3.8–10.5)

## 2017-02-17 PROCEDURE — 99233 SBSQ HOSP IP/OBS HIGH 50: CPT

## 2017-02-17 RX ORDER — OXYCODONE HYDROCHLORIDE 5 MG/1
5 TABLET ORAL ONCE
Qty: 0 | Refills: 0 | Status: DISCONTINUED | OUTPATIENT
Start: 2017-02-17 | End: 2017-02-17

## 2017-02-17 RX ADMIN — OXYCODONE HYDROCHLORIDE 10 MILLIGRAM(S): 5 TABLET ORAL at 21:19

## 2017-02-17 RX ADMIN — OXYCODONE HYDROCHLORIDE 10 MILLIGRAM(S): 5 TABLET ORAL at 13:26

## 2017-02-17 RX ADMIN — CARBIDOPA AND LEVODOPA 1 TABLET(S): 25; 100 TABLET ORAL at 13:28

## 2017-02-17 RX ADMIN — Medication 100 MILLIGRAM(S): at 05:49

## 2017-02-17 RX ADMIN — OXYCODONE HYDROCHLORIDE 10 MILLIGRAM(S): 5 TABLET ORAL at 05:30

## 2017-02-17 RX ADMIN — APIXABAN 2.5 MILLIGRAM(S): 2.5 TABLET, FILM COATED ORAL at 08:16

## 2017-02-17 RX ADMIN — OXYCODONE HYDROCHLORIDE 5 MILLIGRAM(S): 5 TABLET ORAL at 10:06

## 2017-02-17 RX ADMIN — POLYETHYLENE GLYCOL 3350 17 GRAM(S): 17 POWDER, FOR SOLUTION ORAL at 11:43

## 2017-02-17 RX ADMIN — MAGNESIUM HYDROXIDE 30 MILLILITER(S): 400 TABLET, CHEWABLE ORAL at 05:49

## 2017-02-17 RX ADMIN — Medication 1000 MILLIGRAM(S): at 21:20

## 2017-02-17 RX ADMIN — CEFTRIAXONE 100 GRAM(S): 500 INJECTION, POWDER, FOR SOLUTION INTRAMUSCULAR; INTRAVENOUS at 21:20

## 2017-02-17 RX ADMIN — OXYCODONE HYDROCHLORIDE 5 MILLIGRAM(S): 5 TABLET ORAL at 12:10

## 2017-02-17 RX ADMIN — Medication 100 MILLIGRAM(S): at 13:28

## 2017-02-17 RX ADMIN — PRAMIPEXOLE DIHYDROCHLORIDE 0.25 MILLIGRAM(S): 0.12 TABLET ORAL at 05:48

## 2017-02-17 RX ADMIN — Medication 1000 MILLIGRAM(S): at 13:28

## 2017-02-17 RX ADMIN — Medication 100 MILLIGRAM(S): at 21:20

## 2017-02-17 RX ADMIN — PANTOPRAZOLE SODIUM 40 MILLIGRAM(S): 20 TABLET, DELAYED RELEASE ORAL at 11:39

## 2017-02-17 RX ADMIN — Medication 125 MICROGRAM(S): at 05:16

## 2017-02-17 RX ADMIN — OXYCODONE HYDROCHLORIDE 5 MILLIGRAM(S): 5 TABLET ORAL at 11:40

## 2017-02-17 RX ADMIN — CARBIDOPA AND LEVODOPA 1 TABLET(S): 25; 100 TABLET ORAL at 21:18

## 2017-02-17 RX ADMIN — Medication 300 MILLIGRAM(S): at 06:41

## 2017-02-17 RX ADMIN — SODIUM CHLORIDE 75 MILLILITER(S): 9 INJECTION, SOLUTION INTRAVENOUS at 08:17

## 2017-02-17 RX ADMIN — OXYCODONE HYDROCHLORIDE 10 MILLIGRAM(S): 5 TABLET ORAL at 05:00

## 2017-02-17 RX ADMIN — EZETIMIBE 10 MILLIGRAM(S): 10 TABLET ORAL at 21:20

## 2017-02-17 RX ADMIN — SENNA PLUS 2 TABLET(S): 8.6 TABLET ORAL at 21:18

## 2017-02-17 RX ADMIN — PRAMIPEXOLE DIHYDROCHLORIDE 0.25 MILLIGRAM(S): 0.12 TABLET ORAL at 13:27

## 2017-02-17 RX ADMIN — OXYCODONE HYDROCHLORIDE 10 MILLIGRAM(S): 5 TABLET ORAL at 21:49

## 2017-02-17 RX ADMIN — APIXABAN 2.5 MILLIGRAM(S): 2.5 TABLET, FILM COATED ORAL at 21:19

## 2017-02-17 RX ADMIN — OXYCODONE HYDROCHLORIDE 10 MILLIGRAM(S): 5 TABLET ORAL at 13:56

## 2017-02-17 RX ADMIN — Medication 1000 MILLIGRAM(S): at 05:48

## 2017-02-17 RX ADMIN — PRAMIPEXOLE DIHYDROCHLORIDE 0.25 MILLIGRAM(S): 0.12 TABLET ORAL at 21:18

## 2017-02-17 RX ADMIN — CARBIDOPA AND LEVODOPA 1 TABLET(S): 25; 100 TABLET ORAL at 05:48

## 2017-02-17 RX ADMIN — OXYCODONE HYDROCHLORIDE 5 MILLIGRAM(S): 5 TABLET ORAL at 09:36

## 2017-02-17 RX ADMIN — Medication 300 MILLIGRAM(S): at 17:58

## 2017-02-18 LAB
-  AMPICILLIN/SULBACTAM: SIGNIFICANT CHANGE UP
-  CEFAZOLIN: SIGNIFICANT CHANGE UP
-  CIPROFLOXACIN: SIGNIFICANT CHANGE UP
-  CLINDAMYCIN: SIGNIFICANT CHANGE UP
-  ERYTHROMYCIN: SIGNIFICANT CHANGE UP
-  GENTAMICIN: SIGNIFICANT CHANGE UP
-  LEVOFLOXACIN: SIGNIFICANT CHANGE UP
-  MOXIFLOXACIN(AEROBIC): SIGNIFICANT CHANGE UP
-  OXACILLIN: SIGNIFICANT CHANGE UP
-  RIFAMPIN: SIGNIFICANT CHANGE UP
-  TETRACYCLINE: SIGNIFICANT CHANGE UP
-  TRIMETHOPRIM/SULFAMETHOXAZOLE: SIGNIFICANT CHANGE UP
-  VANCOMYCIN: SIGNIFICANT CHANGE UP
ANION GAP SERPL CALC-SCNC: 5 MMOL/L — SIGNIFICANT CHANGE UP (ref 5–17)
BUN SERPL-MCNC: 13 MG/DL — SIGNIFICANT CHANGE UP (ref 7–23)
CALCIUM SERPL-MCNC: 9.4 MG/DL — SIGNIFICANT CHANGE UP (ref 8.4–10.5)
CHLORIDE SERPL-SCNC: 107 MMOL/L — SIGNIFICANT CHANGE UP (ref 96–108)
CO2 SERPL-SCNC: 32 MMOL/L — HIGH (ref 22–31)
CREAT SERPL-MCNC: 0.92 MG/DL — SIGNIFICANT CHANGE UP (ref 0.5–1.3)
GLUCOSE SERPL-MCNC: 104 MG/DL — HIGH (ref 70–99)
HCT VFR BLD CALC: 28.4 % — LOW (ref 34.5–45)
HGB BLD-MCNC: 9.2 G/DL — LOW (ref 11.5–15.5)
MCHC RBC-ENTMCNC: 31.8 PG — SIGNIFICANT CHANGE UP (ref 27–34)
MCHC RBC-ENTMCNC: 32.4 GM/DL — SIGNIFICANT CHANGE UP (ref 32–36)
MCV RBC AUTO: 98.3 FL — SIGNIFICANT CHANGE UP (ref 80–100)
METHOD TYPE: SIGNIFICANT CHANGE UP
PLATELET # BLD AUTO: 331 K/UL — SIGNIFICANT CHANGE UP (ref 150–400)
POTASSIUM SERPL-MCNC: 4.2 MMOL/L — SIGNIFICANT CHANGE UP (ref 3.5–5.3)
POTASSIUM SERPL-SCNC: 4.2 MMOL/L — SIGNIFICANT CHANGE UP (ref 3.5–5.3)
RBC # BLD: 2.89 M/UL — LOW (ref 3.8–5.2)
RBC # FLD: 14.3 % — SIGNIFICANT CHANGE UP (ref 10.3–14.5)
SODIUM SERPL-SCNC: 144 MMOL/L — SIGNIFICANT CHANGE UP (ref 135–145)
VANCOMYCIN TROUGH SERPL-MCNC: 18.5 UG/ML — SIGNIFICANT CHANGE UP (ref 10–20)
WBC # BLD: 7.5 K/UL — SIGNIFICANT CHANGE UP (ref 3.8–10.5)
WBC # FLD AUTO: 7.5 K/UL — SIGNIFICANT CHANGE UP (ref 3.8–10.5)

## 2017-02-18 PROCEDURE — 99232 SBSQ HOSP IP/OBS MODERATE 35: CPT

## 2017-02-18 RX ORDER — MENTHOL AND METHYL SALICYLATE 10; 30 G/100G; G/100G
1 STICK TOPICAL DAILY
Qty: 0 | Refills: 0 | Status: DISCONTINUED | OUTPATIENT
Start: 2017-02-18 | End: 2017-02-22

## 2017-02-18 RX ADMIN — PRAMIPEXOLE DIHYDROCHLORIDE 0.25 MILLIGRAM(S): 0.12 TABLET ORAL at 06:19

## 2017-02-18 RX ADMIN — Medication 1000 MILLIGRAM(S): at 06:19

## 2017-02-18 RX ADMIN — Medication 125 MICROGRAM(S): at 06:20

## 2017-02-18 RX ADMIN — Medication 300 MILLIGRAM(S): at 06:20

## 2017-02-18 RX ADMIN — EZETIMIBE 10 MILLIGRAM(S): 10 TABLET ORAL at 22:00

## 2017-02-18 RX ADMIN — OXYCODONE HYDROCHLORIDE 5 MILLIGRAM(S): 5 TABLET ORAL at 17:44

## 2017-02-18 RX ADMIN — MENTHOL AND METHYL SALICYLATE 1 APPLICATION(S): 10; 30 STICK TOPICAL at 22:47

## 2017-02-18 RX ADMIN — OXYCODONE HYDROCHLORIDE 10 MILLIGRAM(S): 5 TABLET ORAL at 13:54

## 2017-02-18 RX ADMIN — Medication 1000 MILLIGRAM(S): at 22:47

## 2017-02-18 RX ADMIN — CEFTRIAXONE 100 GRAM(S): 500 INJECTION, POWDER, FOR SOLUTION INTRAMUSCULAR; INTRAVENOUS at 21:01

## 2017-02-18 RX ADMIN — Medication 100 MILLIGRAM(S): at 06:19

## 2017-02-18 RX ADMIN — APIXABAN 2.5 MILLIGRAM(S): 2.5 TABLET, FILM COATED ORAL at 12:26

## 2017-02-18 RX ADMIN — CARBIDOPA AND LEVODOPA 1 TABLET(S): 25; 100 TABLET ORAL at 22:47

## 2017-02-18 RX ADMIN — APIXABAN 2.5 MILLIGRAM(S): 2.5 TABLET, FILM COATED ORAL at 22:47

## 2017-02-18 RX ADMIN — OXYCODONE HYDROCHLORIDE 10 MILLIGRAM(S): 5 TABLET ORAL at 23:30

## 2017-02-18 RX ADMIN — SENNA PLUS 2 TABLET(S): 8.6 TABLET ORAL at 22:47

## 2017-02-18 RX ADMIN — OXYCODONE HYDROCHLORIDE 10 MILLIGRAM(S): 5 TABLET ORAL at 12:26

## 2017-02-18 RX ADMIN — OXYCODONE HYDROCHLORIDE 10 MILLIGRAM(S): 5 TABLET ORAL at 06:19

## 2017-02-18 RX ADMIN — CARBIDOPA AND LEVODOPA 1 TABLET(S): 25; 100 TABLET ORAL at 14:52

## 2017-02-18 RX ADMIN — PRAMIPEXOLE DIHYDROCHLORIDE 0.25 MILLIGRAM(S): 0.12 TABLET ORAL at 14:52

## 2017-02-18 RX ADMIN — OXYCODONE HYDROCHLORIDE 5 MILLIGRAM(S): 5 TABLET ORAL at 18:22

## 2017-02-18 RX ADMIN — POLYETHYLENE GLYCOL 3350 17 GRAM(S): 17 POWDER, FOR SOLUTION ORAL at 12:26

## 2017-02-18 RX ADMIN — PRAMIPEXOLE DIHYDROCHLORIDE 0.25 MILLIGRAM(S): 0.12 TABLET ORAL at 22:00

## 2017-02-18 RX ADMIN — PANTOPRAZOLE SODIUM 40 MILLIGRAM(S): 20 TABLET, DELAYED RELEASE ORAL at 12:26

## 2017-02-18 RX ADMIN — CARBIDOPA AND LEVODOPA 1 TABLET(S): 25; 100 TABLET ORAL at 06:20

## 2017-02-18 RX ADMIN — Medication 1000 MILLIGRAM(S): at 14:52

## 2017-02-18 RX ADMIN — Medication 300 MILLIGRAM(S): at 18:12

## 2017-02-18 RX ADMIN — OXYCODONE HYDROCHLORIDE 10 MILLIGRAM(S): 5 TABLET ORAL at 06:49

## 2017-02-18 RX ADMIN — OXYCODONE HYDROCHLORIDE 10 MILLIGRAM(S): 5 TABLET ORAL at 22:45

## 2017-02-19 LAB
ANION GAP SERPL CALC-SCNC: 8 MMOL/L — SIGNIFICANT CHANGE UP (ref 5–17)
BUN SERPL-MCNC: 14 MG/DL — SIGNIFICANT CHANGE UP (ref 7–23)
CALCIUM SERPL-MCNC: 9.7 MG/DL — SIGNIFICANT CHANGE UP (ref 8.4–10.5)
CHLORIDE SERPL-SCNC: 105 MMOL/L — SIGNIFICANT CHANGE UP (ref 96–108)
CO2 SERPL-SCNC: 29 MMOL/L — SIGNIFICANT CHANGE UP (ref 22–31)
CREAT SERPL-MCNC: 0.99 MG/DL — SIGNIFICANT CHANGE UP (ref 0.5–1.3)
GLUCOSE SERPL-MCNC: 115 MG/DL — HIGH (ref 70–99)
HCT VFR BLD CALC: 29.9 % — LOW (ref 34.5–45)
HGB BLD-MCNC: 9.5 G/DL — LOW (ref 11.5–15.5)
MCHC RBC-ENTMCNC: 31.2 PG — SIGNIFICANT CHANGE UP (ref 27–34)
MCHC RBC-ENTMCNC: 31.8 GM/DL — LOW (ref 32–36)
MCV RBC AUTO: 98.3 FL — SIGNIFICANT CHANGE UP (ref 80–100)
PLATELET # BLD AUTO: 344 K/UL — SIGNIFICANT CHANGE UP (ref 150–400)
POTASSIUM SERPL-MCNC: 4.1 MMOL/L — SIGNIFICANT CHANGE UP (ref 3.5–5.3)
POTASSIUM SERPL-SCNC: 4.1 MMOL/L — SIGNIFICANT CHANGE UP (ref 3.5–5.3)
RBC # BLD: 3.04 M/UL — LOW (ref 3.8–5.2)
RBC # FLD: 14.3 % — SIGNIFICANT CHANGE UP (ref 10.3–14.5)
SODIUM SERPL-SCNC: 142 MMOL/L — SIGNIFICANT CHANGE UP (ref 135–145)
WBC # BLD: 7.3 K/UL — SIGNIFICANT CHANGE UP (ref 3.8–10.5)
WBC # FLD AUTO: 7.3 K/UL — SIGNIFICANT CHANGE UP (ref 3.8–10.5)

## 2017-02-19 PROCEDURE — 71010: CPT | Mod: 26

## 2017-02-19 PROCEDURE — 99232 SBSQ HOSP IP/OBS MODERATE 35: CPT

## 2017-02-19 RX ORDER — LACTOBACILLUS ACIDOPHILUS 100MM CELL
1 CAPSULE ORAL
Qty: 0 | Refills: 0 | Status: DISCONTINUED | OUTPATIENT
Start: 2017-02-19 | End: 2017-02-22

## 2017-02-19 RX ORDER — CEFAZOLIN SODIUM 1 G
2000 VIAL (EA) INJECTION EVERY 8 HOURS
Qty: 0 | Refills: 0 | Status: DISCONTINUED | OUTPATIENT
Start: 2017-02-19 | End: 2017-02-20

## 2017-02-19 RX ADMIN — CARBIDOPA AND LEVODOPA 1 TABLET(S): 25; 100 TABLET ORAL at 05:52

## 2017-02-19 RX ADMIN — OXYCODONE HYDROCHLORIDE 10 MILLIGRAM(S): 5 TABLET ORAL at 21:15

## 2017-02-19 RX ADMIN — Medication 100 MILLIGRAM(S): at 14:19

## 2017-02-19 RX ADMIN — EZETIMIBE 10 MILLIGRAM(S): 10 TABLET ORAL at 21:14

## 2017-02-19 RX ADMIN — POLYETHYLENE GLYCOL 3350 17 GRAM(S): 17 POWDER, FOR SOLUTION ORAL at 12:13

## 2017-02-19 RX ADMIN — PANTOPRAZOLE SODIUM 40 MILLIGRAM(S): 20 TABLET, DELAYED RELEASE ORAL at 12:13

## 2017-02-19 RX ADMIN — Medication 1000 MILLIGRAM(S): at 21:12

## 2017-02-19 RX ADMIN — OXYCODONE HYDROCHLORIDE 10 MILLIGRAM(S): 5 TABLET ORAL at 20:45

## 2017-02-19 RX ADMIN — Medication 100 MILLIGRAM(S): at 21:12

## 2017-02-19 RX ADMIN — Medication 1000 MILLIGRAM(S): at 14:16

## 2017-02-19 RX ADMIN — Medication 1 TABLET(S): at 18:06

## 2017-02-19 RX ADMIN — OXYCODONE HYDROCHLORIDE 5 MILLIGRAM(S): 5 TABLET ORAL at 09:13

## 2017-02-19 RX ADMIN — Medication 100 MILLIGRAM(S): at 21:13

## 2017-02-19 RX ADMIN — PRAMIPEXOLE DIHYDROCHLORIDE 0.25 MILLIGRAM(S): 0.12 TABLET ORAL at 05:53

## 2017-02-19 RX ADMIN — OXYCODONE HYDROCHLORIDE 5 MILLIGRAM(S): 5 TABLET ORAL at 16:05

## 2017-02-19 RX ADMIN — CARBIDOPA AND LEVODOPA 1 TABLET(S): 25; 100 TABLET ORAL at 14:17

## 2017-02-19 RX ADMIN — Medication 125 MICROGRAM(S): at 05:52

## 2017-02-19 RX ADMIN — Medication 300 MILLIGRAM(S): at 05:51

## 2017-02-19 RX ADMIN — APIXABAN 2.5 MILLIGRAM(S): 2.5 TABLET, FILM COATED ORAL at 08:31

## 2017-02-19 RX ADMIN — Medication 100 MILLIGRAM(S): at 14:17

## 2017-02-19 RX ADMIN — MENTHOL AND METHYL SALICYLATE 1 APPLICATION(S): 10; 30 STICK TOPICAL at 12:12

## 2017-02-19 RX ADMIN — PRAMIPEXOLE DIHYDROCHLORIDE 0.25 MILLIGRAM(S): 0.12 TABLET ORAL at 14:17

## 2017-02-19 RX ADMIN — OXYCODONE HYDROCHLORIDE 5 MILLIGRAM(S): 5 TABLET ORAL at 08:30

## 2017-02-19 RX ADMIN — CARBIDOPA AND LEVODOPA 1 TABLET(S): 25; 100 TABLET ORAL at 21:14

## 2017-02-19 RX ADMIN — Medication 1000 MILLIGRAM(S): at 05:52

## 2017-02-19 RX ADMIN — OXYCODONE HYDROCHLORIDE 5 MILLIGRAM(S): 5 TABLET ORAL at 15:22

## 2017-02-19 RX ADMIN — APIXABAN 2.5 MILLIGRAM(S): 2.5 TABLET, FILM COATED ORAL at 21:12

## 2017-02-20 PROCEDURE — 99232 SBSQ HOSP IP/OBS MODERATE 35: CPT

## 2017-02-20 RX ORDER — NAFCILLIN 10 G/100ML
INJECTION, POWDER, FOR SOLUTION INTRAVENOUS
Qty: 0 | Refills: 0 | Status: DISCONTINUED | OUTPATIENT
Start: 2017-02-20 | End: 2017-02-22

## 2017-02-20 RX ORDER — NAFCILLIN 10 G/100ML
2 INJECTION, POWDER, FOR SOLUTION INTRAVENOUS
Qty: 420 | Refills: 0 | OUTPATIENT
Start: 2017-02-20 | End: 2017-03-27

## 2017-02-20 RX ORDER — NAFCILLIN 10 G/100ML
2 INJECTION, POWDER, FOR SOLUTION INTRAVENOUS EVERY 4 HOURS
Qty: 0 | Refills: 0 | Status: DISCONTINUED | OUTPATIENT
Start: 2017-02-20 | End: 2017-02-22

## 2017-02-20 RX ORDER — NAFCILLIN 10 G/100ML
2 INJECTION, POWDER, FOR SOLUTION INTRAVENOUS ONCE
Qty: 0 | Refills: 0 | Status: COMPLETED | OUTPATIENT
Start: 2017-02-20 | End: 2017-02-20

## 2017-02-20 RX ADMIN — POLYETHYLENE GLYCOL 3350 17 GRAM(S): 17 POWDER, FOR SOLUTION ORAL at 12:09

## 2017-02-20 RX ADMIN — NAFCILLIN 200 GRAM(S): 10 INJECTION, POWDER, FOR SOLUTION INTRAVENOUS at 16:24

## 2017-02-20 RX ADMIN — PRAMIPEXOLE DIHYDROCHLORIDE 0.25 MILLIGRAM(S): 0.12 TABLET ORAL at 06:23

## 2017-02-20 RX ADMIN — Medication 100 MILLIGRAM(S): at 15:15

## 2017-02-20 RX ADMIN — Medication 125 MICROGRAM(S): at 06:19

## 2017-02-20 RX ADMIN — PRAMIPEXOLE DIHYDROCHLORIDE 0.25 MILLIGRAM(S): 0.12 TABLET ORAL at 21:40

## 2017-02-20 RX ADMIN — Medication 1 TABLET(S): at 08:24

## 2017-02-20 RX ADMIN — Medication 100 MILLIGRAM(S): at 06:21

## 2017-02-20 RX ADMIN — Medication 100 MILLIGRAM(S): at 06:22

## 2017-02-20 RX ADMIN — CARBIDOPA AND LEVODOPA 1 TABLET(S): 25; 100 TABLET ORAL at 21:40

## 2017-02-20 RX ADMIN — CARBIDOPA AND LEVODOPA 1 TABLET(S): 25; 100 TABLET ORAL at 06:22

## 2017-02-20 RX ADMIN — PRAMIPEXOLE DIHYDROCHLORIDE 0.25 MILLIGRAM(S): 0.12 TABLET ORAL at 15:14

## 2017-02-20 RX ADMIN — Medication 100 MILLIGRAM(S): at 21:40

## 2017-02-20 RX ADMIN — APIXABAN 2.5 MILLIGRAM(S): 2.5 TABLET, FILM COATED ORAL at 08:24

## 2017-02-20 RX ADMIN — NAFCILLIN 200 GRAM(S): 10 INJECTION, POWDER, FOR SOLUTION INTRAVENOUS at 12:09

## 2017-02-20 RX ADMIN — OXYCODONE HYDROCHLORIDE 10 MILLIGRAM(S): 5 TABLET ORAL at 12:40

## 2017-02-20 RX ADMIN — MENTHOL AND METHYL SALICYLATE 1 APPLICATION(S): 10; 30 STICK TOPICAL at 12:10

## 2017-02-20 RX ADMIN — NAFCILLIN 200 GRAM(S): 10 INJECTION, POWDER, FOR SOLUTION INTRAVENOUS at 23:31

## 2017-02-20 RX ADMIN — APIXABAN 2.5 MILLIGRAM(S): 2.5 TABLET, FILM COATED ORAL at 21:40

## 2017-02-20 RX ADMIN — Medication 1000 MILLIGRAM(S): at 15:14

## 2017-02-20 RX ADMIN — Medication 1000 MILLIGRAM(S): at 06:22

## 2017-02-20 RX ADMIN — EZETIMIBE 10 MILLIGRAM(S): 10 TABLET ORAL at 21:41

## 2017-02-20 RX ADMIN — OXYCODONE HYDROCHLORIDE 5 MILLIGRAM(S): 5 TABLET ORAL at 03:30

## 2017-02-20 RX ADMIN — CARBIDOPA AND LEVODOPA 1 TABLET(S): 25; 100 TABLET ORAL at 15:14

## 2017-02-20 RX ADMIN — OXYCODONE HYDROCHLORIDE 10 MILLIGRAM(S): 5 TABLET ORAL at 12:10

## 2017-02-20 RX ADMIN — NAFCILLIN 200 GRAM(S): 10 INJECTION, POWDER, FOR SOLUTION INTRAVENOUS at 20:15

## 2017-02-20 RX ADMIN — Medication 1 TABLET(S): at 18:22

## 2017-02-20 RX ADMIN — Medication 1 TABLET(S): at 12:09

## 2017-02-20 RX ADMIN — PANTOPRAZOLE SODIUM 40 MILLIGRAM(S): 20 TABLET, DELAYED RELEASE ORAL at 12:09

## 2017-02-20 RX ADMIN — OXYCODONE HYDROCHLORIDE 5 MILLIGRAM(S): 5 TABLET ORAL at 03:00

## 2017-02-20 RX ADMIN — Medication 1000 MILLIGRAM(S): at 21:40

## 2017-02-21 PROCEDURE — 99232 SBSQ HOSP IP/OBS MODERATE 35: CPT

## 2017-02-21 RX ADMIN — OXYCODONE HYDROCHLORIDE 5 MILLIGRAM(S): 5 TABLET ORAL at 23:23

## 2017-02-21 RX ADMIN — APIXABAN 2.5 MILLIGRAM(S): 2.5 TABLET, FILM COATED ORAL at 09:11

## 2017-02-21 RX ADMIN — NAFCILLIN 200 GRAM(S): 10 INJECTION, POWDER, FOR SOLUTION INTRAVENOUS at 12:16

## 2017-02-21 RX ADMIN — MENTHOL AND METHYL SALICYLATE 1 APPLICATION(S): 10; 30 STICK TOPICAL at 12:16

## 2017-02-21 RX ADMIN — Medication 1000 MILLIGRAM(S): at 21:41

## 2017-02-21 RX ADMIN — Medication 100 MILLIGRAM(S): at 21:41

## 2017-02-21 RX ADMIN — Medication 1 TABLET(S): at 12:16

## 2017-02-21 RX ADMIN — Medication 125 MICROGRAM(S): at 06:24

## 2017-02-21 RX ADMIN — OXYCODONE HYDROCHLORIDE 5 MILLIGRAM(S): 5 TABLET ORAL at 20:53

## 2017-02-21 RX ADMIN — NAFCILLIN 200 GRAM(S): 10 INJECTION, POWDER, FOR SOLUTION INTRAVENOUS at 20:13

## 2017-02-21 RX ADMIN — PRAMIPEXOLE DIHYDROCHLORIDE 0.25 MILLIGRAM(S): 0.12 TABLET ORAL at 21:41

## 2017-02-21 RX ADMIN — PANTOPRAZOLE SODIUM 40 MILLIGRAM(S): 20 TABLET, DELAYED RELEASE ORAL at 12:16

## 2017-02-21 RX ADMIN — Medication 1 TABLET(S): at 16:42

## 2017-02-21 RX ADMIN — OXYCODONE HYDROCHLORIDE 5 MILLIGRAM(S): 5 TABLET ORAL at 04:35

## 2017-02-21 RX ADMIN — POLYETHYLENE GLYCOL 3350 17 GRAM(S): 17 POWDER, FOR SOLUTION ORAL at 12:37

## 2017-02-21 RX ADMIN — OXYCODONE HYDROCHLORIDE 5 MILLIGRAM(S): 5 TABLET ORAL at 13:30

## 2017-02-21 RX ADMIN — NAFCILLIN 200 GRAM(S): 10 INJECTION, POWDER, FOR SOLUTION INTRAVENOUS at 09:11

## 2017-02-21 RX ADMIN — Medication 100 MILLIGRAM(S): at 14:11

## 2017-02-21 RX ADMIN — APIXABAN 2.5 MILLIGRAM(S): 2.5 TABLET, FILM COATED ORAL at 21:41

## 2017-02-21 RX ADMIN — Medication 1000 MILLIGRAM(S): at 06:24

## 2017-02-21 RX ADMIN — PRAMIPEXOLE DIHYDROCHLORIDE 0.25 MILLIGRAM(S): 0.12 TABLET ORAL at 14:11

## 2017-02-21 RX ADMIN — OXYCODONE HYDROCHLORIDE 5 MILLIGRAM(S): 5 TABLET ORAL at 12:39

## 2017-02-21 RX ADMIN — PRAMIPEXOLE DIHYDROCHLORIDE 0.25 MILLIGRAM(S): 0.12 TABLET ORAL at 06:24

## 2017-02-21 RX ADMIN — Medication 1000 MILLIGRAM(S): at 14:11

## 2017-02-21 RX ADMIN — CARBIDOPA AND LEVODOPA 1 TABLET(S): 25; 100 TABLET ORAL at 14:11

## 2017-02-21 RX ADMIN — CARBIDOPA AND LEVODOPA 1 TABLET(S): 25; 100 TABLET ORAL at 21:41

## 2017-02-21 RX ADMIN — EZETIMIBE 10 MILLIGRAM(S): 10 TABLET ORAL at 21:42

## 2017-02-21 RX ADMIN — NAFCILLIN 200 GRAM(S): 10 INJECTION, POWDER, FOR SOLUTION INTRAVENOUS at 04:00

## 2017-02-21 RX ADMIN — OXYCODONE HYDROCHLORIDE 5 MILLIGRAM(S): 5 TABLET ORAL at 04:04

## 2017-02-21 RX ADMIN — NAFCILLIN 200 GRAM(S): 10 INJECTION, POWDER, FOR SOLUTION INTRAVENOUS at 16:42

## 2017-02-21 RX ADMIN — OXYCODONE HYDROCHLORIDE 5 MILLIGRAM(S): 5 TABLET ORAL at 20:23

## 2017-02-21 RX ADMIN — CARBIDOPA AND LEVODOPA 1 TABLET(S): 25; 100 TABLET ORAL at 06:25

## 2017-02-21 RX ADMIN — Medication 1 TABLET(S): at 09:11

## 2017-02-21 NOTE — DIETITIAN INITIAL EVALUATION ADULT. - OTHER INFO
66yr old female admit with infected Lt Knee replacement; seen for LOS; fairly good PO intake of Regular diet, which is well tolerated; self feeding appropriate; NKFA; preferences reviewed; diet education attempted with regards to Obese status; however, pt not ready for change; attributes wt status to lack of mobility

## 2017-02-22 ENCOUNTER — TRANSCRIPTION ENCOUNTER (OUTPATIENT)
Age: 67
End: 2017-02-22

## 2017-02-22 VITALS
DIASTOLIC BLOOD PRESSURE: 79 MMHG | TEMPERATURE: 99 F | OXYGEN SATURATION: 98 % | RESPIRATION RATE: 18 BRPM | HEART RATE: 99 BPM | SYSTOLIC BLOOD PRESSURE: 136 MMHG

## 2017-02-22 PROCEDURE — 89051 BODY FLUID CELL COUNT: CPT

## 2017-02-22 PROCEDURE — 80202 ASSAY OF VANCOMYCIN: CPT

## 2017-02-22 PROCEDURE — 85027 COMPLETE CBC AUTOMATED: CPT

## 2017-02-22 PROCEDURE — 87075 CULTR BACTERIA EXCEPT BLOOD: CPT

## 2017-02-22 PROCEDURE — 97530 THERAPEUTIC ACTIVITIES: CPT

## 2017-02-22 PROCEDURE — 97165 OT EVAL LOW COMPLEX 30 MIN: CPT

## 2017-02-22 PROCEDURE — 88305 TISSUE EXAM BY PATHOLOGIST: CPT

## 2017-02-22 PROCEDURE — 87015 SPECIMEN INFECT AGNT CONCNTJ: CPT

## 2017-02-22 PROCEDURE — 73562 X-RAY EXAM OF KNEE 3: CPT

## 2017-02-22 PROCEDURE — 88108 CYTOPATH CONCENTRATE TECH: CPT

## 2017-02-22 PROCEDURE — 88300 SURGICAL PATH GROSS: CPT

## 2017-02-22 PROCEDURE — 94664 DEMO&/EVAL PT USE INHALER: CPT

## 2017-02-22 PROCEDURE — 80048 BASIC METABOLIC PNL TOTAL CA: CPT

## 2017-02-22 PROCEDURE — 87102 FUNGUS ISOLATION CULTURE: CPT

## 2017-02-22 PROCEDURE — 97110 THERAPEUTIC EXERCISES: CPT

## 2017-02-22 PROCEDURE — 36569 INSJ PICC 5 YR+ W/O IMAGING: CPT

## 2017-02-22 PROCEDURE — C1713: CPT

## 2017-02-22 PROCEDURE — 97161 PT EVAL LOW COMPLEX 20 MIN: CPT

## 2017-02-22 PROCEDURE — 87070 CULTURE OTHR SPECIMN AEROBIC: CPT

## 2017-02-22 PROCEDURE — 71045 X-RAY EXAM CHEST 1 VIEW: CPT

## 2017-02-22 PROCEDURE — 87116 MYCOBACTERIA CULTURE: CPT

## 2017-02-22 PROCEDURE — 99238 HOSP IP/OBS DSCHRG MGMT 30/<: CPT

## 2017-02-22 PROCEDURE — C1751: CPT

## 2017-02-22 PROCEDURE — 87205 SMEAR GRAM STAIN: CPT

## 2017-02-22 PROCEDURE — 87186 SC STD MICRODIL/AGAR DIL: CPT

## 2017-02-22 PROCEDURE — 88311 DECALCIFY TISSUE: CPT

## 2017-02-22 PROCEDURE — 97535 SELF CARE MNGMENT TRAINING: CPT

## 2017-02-22 PROCEDURE — 74018 RADEX ABDOMEN 1 VIEW: CPT

## 2017-02-22 PROCEDURE — 97116 GAIT TRAINING THERAPY: CPT

## 2017-02-22 PROCEDURE — 87206 SMEAR FLUORESCENT/ACID STAI: CPT

## 2017-02-22 RX ORDER — APIXABAN 2.5 MG/1
1 TABLET, FILM COATED ORAL
Qty: 12 | Refills: 0 | OUTPATIENT
Start: 2017-02-22 | End: 2017-02-28

## 2017-02-22 RX ORDER — SENNA PLUS 8.6 MG/1
2 TABLET ORAL
Qty: 0 | Refills: 0 | COMMUNITY
Start: 2017-02-22

## 2017-02-22 RX ORDER — LACTOBACILLUS ACIDOPHILUS 100MM CELL
1 CAPSULE ORAL
Qty: 0 | Refills: 0 | COMMUNITY
Start: 2017-02-22 | End: 2017-03-30

## 2017-02-22 RX ORDER — POLYETHYLENE GLYCOL 3350 17 G/17G
17 POWDER, FOR SOLUTION ORAL
Qty: 0 | Refills: 0 | COMMUNITY
Start: 2017-02-22

## 2017-02-22 RX ORDER — ACETAMINOPHEN 500 MG
2 TABLET ORAL
Qty: 0 | Refills: 0 | COMMUNITY
Start: 2017-02-22 | End: 2017-03-08

## 2017-02-22 RX ORDER — OXYCODONE HYDROCHLORIDE 5 MG/1
1 TABLET ORAL
Qty: 60 | Refills: 0 | OUTPATIENT
Start: 2017-02-22 | End: 2017-03-08

## 2017-02-22 RX ORDER — PANTOPRAZOLE SODIUM 20 MG/1
1 TABLET, DELAYED RELEASE ORAL
Qty: 35 | Refills: 0 | OUTPATIENT
Start: 2017-02-22 | End: 2017-03-29

## 2017-02-22 RX ORDER — LEVOTHYROXINE SODIUM 125 MCG
1 TABLET ORAL
Qty: 0 | Refills: 0 | COMMUNITY
Start: 2017-02-22

## 2017-02-22 RX ORDER — ASPIRIN/CALCIUM CARB/MAGNESIUM 324 MG
1 TABLET ORAL
Qty: 56 | Refills: 0 | OUTPATIENT
Start: 2017-02-22 | End: 2017-03-22

## 2017-02-22 RX ADMIN — OXYCODONE HYDROCHLORIDE 5 MILLIGRAM(S): 5 TABLET ORAL at 00:26

## 2017-02-22 RX ADMIN — APIXABAN 2.5 MILLIGRAM(S): 2.5 TABLET, FILM COATED ORAL at 08:48

## 2017-02-22 RX ADMIN — OXYCODONE HYDROCHLORIDE 5 MILLIGRAM(S): 5 TABLET ORAL at 04:20

## 2017-02-22 RX ADMIN — Medication 125 MICROGRAM(S): at 05:54

## 2017-02-22 RX ADMIN — PRAMIPEXOLE DIHYDROCHLORIDE 0.25 MILLIGRAM(S): 0.12 TABLET ORAL at 13:54

## 2017-02-22 RX ADMIN — NAFCILLIN 200 GRAM(S): 10 INJECTION, POWDER, FOR SOLUTION INTRAVENOUS at 00:18

## 2017-02-22 RX ADMIN — Medication 1 TABLET(S): at 08:48

## 2017-02-22 RX ADMIN — OXYCODONE HYDROCHLORIDE 5 MILLIGRAM(S): 5 TABLET ORAL at 04:55

## 2017-02-22 RX ADMIN — NAFCILLIN 200 GRAM(S): 10 INJECTION, POWDER, FOR SOLUTION INTRAVENOUS at 04:21

## 2017-02-22 RX ADMIN — Medication 100 MILLIGRAM(S): at 05:54

## 2017-02-22 RX ADMIN — Medication 1000 MILLIGRAM(S): at 05:54

## 2017-02-22 RX ADMIN — PANTOPRAZOLE SODIUM 40 MILLIGRAM(S): 20 TABLET, DELAYED RELEASE ORAL at 11:31

## 2017-02-22 RX ADMIN — Medication 1000 MILLIGRAM(S): at 13:54

## 2017-02-22 RX ADMIN — PRAMIPEXOLE DIHYDROCHLORIDE 0.25 MILLIGRAM(S): 0.12 TABLET ORAL at 05:54

## 2017-02-22 RX ADMIN — CARBIDOPA AND LEVODOPA 1 TABLET(S): 25; 100 TABLET ORAL at 05:54

## 2017-02-22 RX ADMIN — CARBIDOPA AND LEVODOPA 1 TABLET(S): 25; 100 TABLET ORAL at 13:54

## 2017-02-22 RX ADMIN — Medication 1 TABLET(S): at 11:31

## 2017-02-22 RX ADMIN — NAFCILLIN 200 GRAM(S): 10 INJECTION, POWDER, FOR SOLUTION INTRAVENOUS at 08:48

## 2017-02-22 RX ADMIN — MENTHOL AND METHYL SALICYLATE 1 APPLICATION(S): 10; 30 STICK TOPICAL at 11:28

## 2017-02-22 RX ADMIN — Medication 100 MILLIGRAM(S): at 13:54

## 2017-02-22 RX ADMIN — NAFCILLIN 200 GRAM(S): 10 INJECTION, POWDER, FOR SOLUTION INTRAVENOUS at 11:31

## 2017-02-22 NOTE — H&P PST ADULT - IS PATIENT PREGNANT?
Encounter Date: 2/22/2017    SCRIBE #1 NOTE: I, Azalea Abebe, am scribing for, and in the presence of, Dr. Ricardo.       History     Chief Complaint   Patient presents with    Alcohol Intoxication     patient found sleeping on the side of the road, abrasion noted to right arm     Review of patient's allergies indicates:  No Known Allergies  HPI Comments: Time seen by provider: 9:28 AM    This is a 47 y.o. male who presents via EMS with mild altered mental status, apparent alcohol intoxication. Per EMS, the patient was found sleeping on the side of a road prior to arrival. When EMS woke him he was unable to stand with slurred speech. The patient admits to drinking alcohol today with a gradual onset of constant symptoms. He denies any associated complaints, including headache and chest pain.     The history is provided by the patient and the EMS personnel.     Past Medical History   Diagnosis Date    Coronary artery disease     Depression     History of psychiatric care     History of psychiatric hospitalization     Hypertension     MI (myocardial infarction)      6 months ago    Neuropathy     Psychiatric problem     Psychosis     Self-harming behavior     Suicide attempt      Past Medical History Pertinent Negatives   Diagnosis Date Noted    ADHD (attention deficit hyperactivity disorder) 9/3/2014    Anxiety 9/3/2014    Behavioral problem 9/3/2014    Bipolar disorder 9/3/2014    Borderline personality disorder 9/3/2014    CHF (congestive heart failure) 9/3/2014    COPD (chronic obstructive pulmonary disease) 9/3/2014    CVA (cerebral vascular accident) 9/3/2014    Dementia 9/3/2014    Dialysis patient 9/3/2014    Fatigue 9/3/2014    Headache(784.0) 9/3/2014    HIV infection 9/3/2014    Liver disease 9/3/2014    Viv 9/3/2014    Obsessive-compulsive disorder 9/3/2014    Oppositional defiant disorder 9/3/2014    Psychiatric exam requested by authority 9/3/2014    PTSD (post-traumatic  stress disorder) 9/3/2014    Renal disorder 9/3/2014    Schizoaffective disorder 9/3/2014    Seizures 9/3/2014    Therapy 9/3/2014    Thyroid disease 9/3/2014     History reviewed. No pertinent past surgical history.  Family History   Problem Relation Age of Onset    No Known Problems Mother     No Known Problems Father     No Known Problems Sister     No Known Problems Brother     No Known Problems Maternal Aunt     No Known Problems Paternal Aunt     No Known Problems Maternal Uncle     No Known Problems Paternal Uncle     No Known Problems Maternal Grandfather     No Known Problems Maternal Grandmother     No Known Problems Paternal Grandfather     No Known Problems Paternal Grandmother     ADD / ADHD Cousin     Alcohol abuse Neg Hx     Anxiety disorder Neg Hx     Bipolar disorder Neg Hx     Dementia Neg Hx     Depression Neg Hx     Drug abuse Neg Hx     OCD Neg Hx     Paranoid behavior Neg Hx     Physical abuse Neg Hx     Schizophrenia Neg Hx     Seizures Neg Hx     Self injury Neg Hx     Sexual abuse Neg Hx     Suicide Neg Hx      Social History   Substance Use Topics    Smoking status: Current Every Day Smoker     Packs/day: 2.00     Years: 30.00    Smokeless tobacco: None    Alcohol use Yes     Review of Systems   Constitutional: Negative for chills and fever.   HENT: Negative for congestion and facial swelling.    Respiratory: Negative for chest tightness and shortness of breath.    Cardiovascular: Negative for chest pain.   Gastrointestinal: Negative for abdominal pain, nausea and vomiting.   Endocrine: Negative for polyuria.   Genitourinary: Negative for dysuria.   Musculoskeletal: Negative for myalgias.   Skin: Negative for rash.   Neurological: Negative for headaches.       Physical Exam   Initial Vitals   BP Pulse Resp Temp SpO2   02/22/17 0919 02/22/17 0919 02/22/17 0919 02/22/17 0919 02/22/17 0919   117/72 18 89 97.6 °F (36.4 °C) 100 %     Physical Exam    Nursing  note and vitals reviewed.  Constitutional: He appears well-developed and well-nourished. He is not diaphoretic. No distress.   Odor of alcohol on breath.   HENT:   Head: Normocephalic and atraumatic.   Mouth/Throat: Oropharynx is clear and moist.   Eyes: Conjunctivae and EOM are normal. Pupils are equal, round, and reactive to light.   Neck: Normal range of motion. Neck supple.   Cardiovascular: Normal rate, regular rhythm, S1 normal, S2 normal and normal heart sounds. Exam reveals no gallop and no friction rub.    No murmur heard.  Pulmonary/Chest: Breath sounds normal. No respiratory distress. He has no wheezes. He has no rhonchi. He has no rales.   Abdominal: Soft. Bowel sounds are normal. There is no tenderness. There is no rebound and no guarding.   Musculoskeletal: Normal range of motion. He exhibits no edema or tenderness.   No lower extremity edema.    Lymphadenopathy:     He has no cervical adenopathy.   Neurological: He is alert and oriented to person, place, and time.   Skin: Skin is warm and dry. No rash noted. No pallor.   Psychiatric: He has a normal mood and affect. His behavior is normal. Judgment and thought content normal.         ED Course   Procedures  Labs Reviewed   ALCOHOL,MEDICAL (ETHANOL) - Abnormal; Notable for the following:        Result Value    Alcohol, Medical, Serum 424 (*)     All other components within normal limits    Narrative:     ALC critical result(s) called and verbal readback obtained from   Yehuda Arteaga RN. , 02/22/2017 10:25   POCT GLUCOSE             Medical Decision Making:   Clinical Tests:   Lab Tests: Ordered and Reviewed            Scribe Attestation:   Scribe #1: I performed the above scribed service and the documentation accurately describes the services I performed. I attest to the accuracy of the note.    Attending Attestation:           Physician Attestation for Scribe:  Physician Attestation Statement for Scribe #1: I, Dr. Ricardo, reviewed documentation, as  scribed by Azalea Abebe in my presence, and it is both accurate and complete.         Attending ED Notes:   Urgent evaluation a 47-year-old male with confusion secondary to alcohol intoxication.  Patient is afebrile, initially belligerent, uncooperative, unstable on his feet and at risk of harming himself and others.  Patient was placed in soft restraints.  Rest the physical exam is benign.  Patient denies trauma.  No clinical evidence of trauma.  Physical exam is benign except for EtOH on breath.PERRLA, EOMI.  Strength 5 of 5 throughout.  Two point discrimination is intact throughout.  Sensation intact to light touch throughout.  Reflexes 2+ throughout.  Good finger to nose task ability. Negative pronator drift.  No papilledema.  No meningeal signs.  Alcohol level of 424.  Blood glucose of 104.  Patient is observed for resolution of altered mental status.  Patient is observed for sobriety.  On discharge patient is alert and oriented ×4 and without evidence of acute desiccation, slurred speech, nystagmus or altered gait with a GCS of 15.  The patient is extensive the counseled on his diagnosis and treatment, discharged in good condition and directed follow-up his primary care physician in the next 24-48 hours.        9:39 AM: Patient unable to ambulate with a steady gait. Will continue to monitor.           ED Course     Clinical Impression:     1. Alcohol intoxication, uncomplicated    2. Confusion             Shawn Garcia MD  02/22/17 3013     no

## 2017-03-01 LAB
CULTURE RESULTS: SIGNIFICANT CHANGE UP
ORGANISM # SPEC MICROSCOPIC CNT: SIGNIFICANT CHANGE UP
ORGANISM # SPEC MICROSCOPIC CNT: SIGNIFICANT CHANGE UP
SPECIMEN SOURCE: SIGNIFICANT CHANGE UP

## 2017-03-07 ENCOUNTER — APPOINTMENT (OUTPATIENT)
Dept: ORTHOPEDIC SURGERY | Facility: CLINIC | Age: 67
End: 2017-03-07

## 2017-03-07 VITALS
WEIGHT: 230 LBS | SYSTOLIC BLOOD PRESSURE: 117 MMHG | DIASTOLIC BLOOD PRESSURE: 72 MMHG | BODY MASS INDEX: 39.27 KG/M2 | HEIGHT: 64 IN | HEART RATE: 91 BPM

## 2017-03-21 ENCOUNTER — APPOINTMENT (OUTPATIENT)
Dept: ORTHOPEDIC SURGERY | Facility: CLINIC | Age: 67
End: 2017-03-21

## 2017-04-04 ENCOUNTER — LABORATORY RESULT (OUTPATIENT)
Age: 67
End: 2017-04-04

## 2017-04-04 ENCOUNTER — APPOINTMENT (OUTPATIENT)
Dept: ORTHOPEDIC SURGERY | Facility: CLINIC | Age: 67
End: 2017-04-04

## 2017-04-04 VITALS
SYSTOLIC BLOOD PRESSURE: 122 MMHG | WEIGHT: 230 LBS | DIASTOLIC BLOOD PRESSURE: 74 MMHG | HEIGHT: 64 IN | BODY MASS INDEX: 39.27 KG/M2 | HEART RATE: 94 BPM

## 2017-04-10 ENCOUNTER — OUTPATIENT (OUTPATIENT)
Dept: OUTPATIENT SERVICES | Facility: HOSPITAL | Age: 67
LOS: 1 days | End: 2017-04-10
Payer: MEDICARE

## 2017-04-10 VITALS
SYSTOLIC BLOOD PRESSURE: 130 MMHG | TEMPERATURE: 98 F | DIASTOLIC BLOOD PRESSURE: 70 MMHG | HEIGHT: 63 IN | HEART RATE: 96 BPM | RESPIRATION RATE: 14 BRPM | WEIGHT: 231.49 LBS

## 2017-04-10 DIAGNOSIS — Z96.659 PRESENCE OF UNSPECIFIED ARTIFICIAL KNEE JOINT: ICD-10-CM

## 2017-04-10 DIAGNOSIS — Z98.890 OTHER SPECIFIED POSTPROCEDURAL STATES: Chronic | ICD-10-CM

## 2017-04-10 DIAGNOSIS — Z96.659 PRESENCE OF UNSPECIFIED ARTIFICIAL KNEE JOINT: Chronic | ICD-10-CM

## 2017-04-10 DIAGNOSIS — Z01.818 ENCOUNTER FOR OTHER PREPROCEDURAL EXAMINATION: ICD-10-CM

## 2017-04-10 DIAGNOSIS — T84.018A BROKEN INTERNAL JOINT PROSTHESIS, OTHER SITE, INITIAL ENCOUNTER: ICD-10-CM

## 2017-04-10 DIAGNOSIS — Z98.89 OTHER SPECIFIED POSTPROCEDURAL STATES: Chronic | ICD-10-CM

## 2017-04-10 LAB
ALBUMIN SERPL ELPH-MCNC: 3.4 G/DL — SIGNIFICANT CHANGE UP (ref 3.3–5)
ALP SERPL-CCNC: 149 U/L — HIGH (ref 30–120)
ALT FLD-CCNC: 15 U/L DA — SIGNIFICANT CHANGE UP (ref 10–60)
ANION GAP SERPL CALC-SCNC: 7 MMOL/L — SIGNIFICANT CHANGE UP (ref 5–17)
APTT BLD: 32.5 SEC — SIGNIFICANT CHANGE UP (ref 27.5–37.4)
AST SERPL-CCNC: 24 U/L — SIGNIFICANT CHANGE UP (ref 10–40)
BILIRUB SERPL-MCNC: 0.9 MG/DL — SIGNIFICANT CHANGE UP (ref 0.2–1.2)
BLD GP AB SCN SERPL QL: SIGNIFICANT CHANGE UP
BUN SERPL-MCNC: 32 MG/DL — HIGH (ref 7–23)
CALCIUM SERPL-MCNC: 10.1 MG/DL — SIGNIFICANT CHANGE UP (ref 8.4–10.5)
CHLORIDE SERPL-SCNC: 105 MMOL/L — SIGNIFICANT CHANGE UP (ref 96–108)
CO2 SERPL-SCNC: 30 MMOL/L — SIGNIFICANT CHANGE UP (ref 22–31)
CREAT SERPL-MCNC: 1.12 MG/DL — SIGNIFICANT CHANGE UP (ref 0.5–1.3)
GLUCOSE SERPL-MCNC: 100 MG/DL — HIGH (ref 70–99)
HCT VFR BLD CALC: 37.3 % — SIGNIFICANT CHANGE UP (ref 34.5–45)
HGB BLD-MCNC: 12.6 G/DL — SIGNIFICANT CHANGE UP (ref 11.5–15.5)
INR BLD: 1.06 RATIO — SIGNIFICANT CHANGE UP (ref 0.88–1.16)
MCHC RBC-ENTMCNC: 33.8 GM/DL — SIGNIFICANT CHANGE UP (ref 32–36)
MCHC RBC-ENTMCNC: 34 PG — SIGNIFICANT CHANGE UP (ref 27–34)
MCV RBC AUTO: 100.5 FL — HIGH (ref 80–100)
PLATELET # BLD AUTO: 296 K/UL — SIGNIFICANT CHANGE UP (ref 150–400)
POTASSIUM SERPL-MCNC: 4.2 MMOL/L — SIGNIFICANT CHANGE UP (ref 3.5–5.3)
POTASSIUM SERPL-SCNC: 4.2 MMOL/L — SIGNIFICANT CHANGE UP (ref 3.5–5.3)
PROT SERPL-MCNC: 8 G/DL — SIGNIFICANT CHANGE UP (ref 6–8.3)
PROTHROM AB SERPL-ACNC: 11.6 SEC — SIGNIFICANT CHANGE UP (ref 9.8–12.7)
RBC # BLD: 3.71 M/UL — LOW (ref 3.8–5.2)
RBC # FLD: 17.1 % — HIGH (ref 10.3–14.5)
SODIUM SERPL-SCNC: 142 MMOL/L — SIGNIFICANT CHANGE UP (ref 135–145)
WBC # BLD: 6.1 K/UL — SIGNIFICANT CHANGE UP (ref 3.8–10.5)
WBC # FLD AUTO: 6.1 K/UL — SIGNIFICANT CHANGE UP (ref 3.8–10.5)

## 2017-04-10 PROCEDURE — 93010 ELECTROCARDIOGRAM REPORT: CPT | Mod: NC

## 2017-04-10 NOTE — H&P PST ADULT - HISTORY OF PRESENT ILLNESS
66 Y.o . female presents here w/ long standing hx. of left knee pain 10/10 had Left knee replacement in February 2016 and subsequent pain w/ stiffness had xray and mri and was told needed revision.  w/ decreased R.O.M., ambulation, mobility and ADL function. Seen by  and referred for surgery. P.T. no help.  Takes oxycodene for pain. This is a 67 y/o female who had undergone left knee replacement 2016 ,s/p cement spacer and removal of implant  due to bacterial infection  2/2017 presents with complaint of left knee swelling and stiffness. Being followed up with ID ,treated with antibiotics and now she is cleared from infectious disease .scheduled for removal of cement spacer left knee and reimplantation left knee replacement

## 2017-04-10 NOTE — H&P PST ADULT - PSH
S/P   x 2  S/P knee replacement  bilateral 1 week apart  S/P   x 2  S/P knee replacement  bilateral 1 week apart   S/P knee surgery  s/p cement spacer due to bacterial infection 2017

## 2017-04-10 NOTE — H&P PST ADULT - PROBLEM SELECTOR PLAN 1
scheduled for removal of cement spacer left knee and reimplantation left knee replacement   Medical clearance   ID note   Pre op instructions

## 2017-04-10 NOTE — H&P PST ADULT - MUSCULOSKELETAL
details… detailed exam diminished strength/decreased ROM/joint swelling/decreased ROM due to pain diminished strength/decreased ROM/joint swelling/decreased ROM due to pain/joint warmth

## 2017-04-11 LAB
MRSA PCR RESULT.: SIGNIFICANT CHANGE UP
S AUREUS DNA NOSE QL NAA+PROBE: SIGNIFICANT CHANGE UP

## 2017-04-25 ENCOUNTER — RESULT REVIEW (OUTPATIENT)
Age: 67
End: 2017-04-25

## 2017-04-25 RX ORDER — CELECOXIB 200 MG/1
200 CAPSULE ORAL ONCE
Qty: 0 | Refills: 0 | Status: COMPLETED | OUTPATIENT
Start: 2017-04-26 | End: 2017-04-26

## 2017-04-25 RX ORDER — APREPITANT 80 MG/1
40 CAPSULE ORAL ONCE
Qty: 0 | Refills: 0 | Status: COMPLETED | OUTPATIENT
Start: 2017-04-26 | End: 2017-04-26

## 2017-04-25 NOTE — PATIENT PROFILE ADULT. - PSH
S/P   x 2  S/P knee replacement  bilateral 1 week apart   S/P knee surgery  s/p cement spacer due to bacterial infection 2017

## 2017-04-26 ENCOUNTER — APPOINTMENT (OUTPATIENT)
Dept: ORTHOPEDIC SURGERY | Facility: HOSPITAL | Age: 67
End: 2017-04-26

## 2017-04-26 ENCOUNTER — INPATIENT (INPATIENT)
Facility: HOSPITAL | Age: 67
LOS: 2 days | Discharge: HOME | DRG: 467 | End: 2017-04-29
Attending: INTERNAL MEDICINE | Admitting: ORTHOPAEDIC SURGERY
Payer: MEDICARE

## 2017-04-26 VITALS
DIASTOLIC BLOOD PRESSURE: 69 MMHG | WEIGHT: 230.6 LBS | HEIGHT: 63 IN | OXYGEN SATURATION: 100 % | SYSTOLIC BLOOD PRESSURE: 141 MMHG | HEART RATE: 89 BPM | RESPIRATION RATE: 16 BRPM | TEMPERATURE: 98 F

## 2017-04-26 DIAGNOSIS — Z96.659 PRESENCE OF UNSPECIFIED ARTIFICIAL KNEE JOINT: ICD-10-CM

## 2017-04-26 DIAGNOSIS — Z96.659 PRESENCE OF UNSPECIFIED ARTIFICIAL KNEE JOINT: Chronic | ICD-10-CM

## 2017-04-26 DIAGNOSIS — Z98.89 OTHER SPECIFIED POSTPROCEDURAL STATES: Chronic | ICD-10-CM

## 2017-04-26 DIAGNOSIS — T84.018A BROKEN INTERNAL JOINT PROSTHESIS, OTHER SITE, INITIAL ENCOUNTER: ICD-10-CM

## 2017-04-26 DIAGNOSIS — Z98.890 OTHER SPECIFIED POSTPROCEDURAL STATES: Chronic | ICD-10-CM

## 2017-04-26 DIAGNOSIS — Z01.818 ENCOUNTER FOR OTHER PREPROCEDURAL EXAMINATION: ICD-10-CM

## 2017-04-26 LAB
ANION GAP SERPL CALC-SCNC: 7 MMOL/L — SIGNIFICANT CHANGE UP (ref 5–17)
BUN SERPL-MCNC: 22 MG/DL — SIGNIFICANT CHANGE UP (ref 7–23)
CALCIUM SERPL-MCNC: 9.2 MG/DL — SIGNIFICANT CHANGE UP (ref 8.4–10.5)
CHLORIDE SERPL-SCNC: 105 MMOL/L — SIGNIFICANT CHANGE UP (ref 96–108)
CO2 SERPL-SCNC: 28 MMOL/L — SIGNIFICANT CHANGE UP (ref 22–31)
CREAT SERPL-MCNC: 1.05 MG/DL — SIGNIFICANT CHANGE UP (ref 0.5–1.3)
GLUCOSE SERPL-MCNC: 182 MG/DL — HIGH (ref 70–99)
HCT VFR BLD CALC: 36 % — SIGNIFICANT CHANGE UP (ref 34.5–45)
HGB BLD-MCNC: 11.8 G/DL — SIGNIFICANT CHANGE UP (ref 11.5–15.5)
MCHC RBC-ENTMCNC: 32.9 GM/DL — SIGNIFICANT CHANGE UP (ref 32–36)
MCHC RBC-ENTMCNC: 33.6 PG — SIGNIFICANT CHANGE UP (ref 27–34)
MCV RBC AUTO: 102.3 FL — HIGH (ref 80–100)
PLATELET # BLD AUTO: 289 K/UL — SIGNIFICANT CHANGE UP (ref 150–400)
POTASSIUM SERPL-MCNC: 4.2 MMOL/L — SIGNIFICANT CHANGE UP (ref 3.5–5.3)
POTASSIUM SERPL-SCNC: 4.2 MMOL/L — SIGNIFICANT CHANGE UP (ref 3.5–5.3)
RBC # BLD: 3.52 M/UL — LOW (ref 3.8–5.2)
RBC # FLD: 14.6 % — HIGH (ref 10.3–14.5)
SODIUM SERPL-SCNC: 140 MMOL/L — SIGNIFICANT CHANGE UP (ref 135–145)
WBC # BLD: 11.4 K/UL — HIGH (ref 3.8–10.5)
WBC # FLD AUTO: 11.4 K/UL — HIGH (ref 3.8–10.5)

## 2017-04-26 PROCEDURE — 88305 TISSUE EXAM BY PATHOLOGIST: CPT | Mod: 26

## 2017-04-26 PROCEDURE — 86901 BLOOD TYPING SEROLOGIC RH(D): CPT

## 2017-04-26 PROCEDURE — 11982 REMOVE DRUG IMPLANT DEVICE: CPT | Mod: LT

## 2017-04-26 PROCEDURE — 85027 COMPLETE CBC AUTOMATED: CPT

## 2017-04-26 PROCEDURE — 27447 TOTAL KNEE ARTHROPLASTY: CPT | Mod: 58,LT

## 2017-04-26 PROCEDURE — 93005 ELECTROCARDIOGRAM TRACING: CPT

## 2017-04-26 PROCEDURE — 85610 PROTHROMBIN TIME: CPT

## 2017-04-26 PROCEDURE — 87640 STAPH A DNA AMP PROBE: CPT

## 2017-04-26 PROCEDURE — 73562 X-RAY EXAM OF KNEE 3: CPT | Mod: 26,LT

## 2017-04-26 PROCEDURE — 88300 SURGICAL PATH GROSS: CPT | Mod: 26

## 2017-04-26 PROCEDURE — 85730 THROMBOPLASTIN TIME PARTIAL: CPT

## 2017-04-26 PROCEDURE — 80053 COMPREHEN METABOLIC PANEL: CPT

## 2017-04-26 PROCEDURE — 86920 COMPATIBILITY TEST SPIN: CPT

## 2017-04-26 PROCEDURE — 88311 DECALCIFY TISSUE: CPT | Mod: 26

## 2017-04-26 PROCEDURE — 86900 BLOOD TYPING SEROLOGIC ABO: CPT

## 2017-04-26 PROCEDURE — 86850 RBC ANTIBODY SCREEN: CPT

## 2017-04-26 PROCEDURE — 99222 1ST HOSP IP/OBS MODERATE 55: CPT

## 2017-04-26 RX ORDER — ACETAMINOPHEN 500 MG
1000 TABLET ORAL EVERY 6 HOURS
Qty: 0 | Refills: 0 | Status: COMPLETED | OUTPATIENT
Start: 2017-04-26 | End: 2017-04-27

## 2017-04-26 RX ORDER — FAMOTIDINE 10 MG/ML
0 INJECTION INTRAVENOUS
Qty: 0 | Refills: 0 | COMMUNITY

## 2017-04-26 RX ORDER — ACETAMINOPHEN 500 MG
1000 TABLET ORAL ONCE
Qty: 0 | Refills: 0 | Status: COMPLETED | OUTPATIENT
Start: 2017-04-26 | End: 2017-04-26

## 2017-04-26 RX ORDER — CEFAZOLIN SODIUM 1 G
2000 VIAL (EA) INJECTION ONCE
Qty: 0 | Refills: 0 | Status: COMPLETED | OUTPATIENT
Start: 2017-04-26 | End: 2017-04-26

## 2017-04-26 RX ORDER — MAGNESIUM HYDROXIDE 400 MG/1
30 TABLET, CHEWABLE ORAL DAILY
Qty: 0 | Refills: 0 | Status: DISCONTINUED | OUTPATIENT
Start: 2017-04-26 | End: 2017-04-29

## 2017-04-26 RX ORDER — SODIUM CHLORIDE 9 MG/ML
1000 INJECTION, SOLUTION INTRAVENOUS
Qty: 0 | Refills: 0 | Status: DISCONTINUED | OUTPATIENT
Start: 2017-04-26 | End: 2017-04-27

## 2017-04-26 RX ORDER — CARBIDOPA AND LEVODOPA 25; 100 MG/1; MG/1
1 TABLET ORAL THREE TIMES A DAY
Qty: 0 | Refills: 0 | Status: DISCONTINUED | OUTPATIENT
Start: 2017-04-26 | End: 2017-04-27

## 2017-04-26 RX ORDER — ACETAMINOPHEN 500 MG
1000 TABLET ORAL EVERY 8 HOURS
Qty: 0 | Refills: 0 | Status: DISCONTINUED | OUTPATIENT
Start: 2017-04-27 | End: 2017-04-29

## 2017-04-26 RX ORDER — PANTOPRAZOLE SODIUM 20 MG/1
40 TABLET, DELAYED RELEASE ORAL
Qty: 0 | Refills: 0 | Status: DISCONTINUED | OUTPATIENT
Start: 2017-04-26 | End: 2017-04-29

## 2017-04-26 RX ORDER — TRANEXAMIC ACID 100 MG/ML
1000 INJECTION, SOLUTION INTRAVENOUS ONCE
Qty: 0 | Refills: 0 | Status: COMPLETED | OUTPATIENT
Start: 2017-04-26 | End: 2017-04-26

## 2017-04-26 RX ORDER — HYDROMORPHONE HYDROCHLORIDE 2 MG/ML
0.5 INJECTION INTRAMUSCULAR; INTRAVENOUS; SUBCUTANEOUS
Qty: 0 | Refills: 0 | Status: DISCONTINUED | OUTPATIENT
Start: 2017-04-26 | End: 2017-04-27

## 2017-04-26 RX ORDER — DOCUSATE SODIUM 100 MG
100 CAPSULE ORAL THREE TIMES A DAY
Qty: 0 | Refills: 0 | Status: DISCONTINUED | OUTPATIENT
Start: 2017-04-26 | End: 2017-04-29

## 2017-04-26 RX ORDER — SENNA PLUS 8.6 MG/1
2 TABLET ORAL AT BEDTIME
Qty: 0 | Refills: 0 | Status: DISCONTINUED | OUTPATIENT
Start: 2017-04-26 | End: 2017-04-29

## 2017-04-26 RX ORDER — POLYETHYLENE GLYCOL 3350 17 G/17G
17 POWDER, FOR SOLUTION ORAL DAILY
Qty: 0 | Refills: 0 | Status: DISCONTINUED | OUTPATIENT
Start: 2017-04-26 | End: 2017-04-29

## 2017-04-26 RX ORDER — ONDANSETRON 8 MG/1
4 TABLET, FILM COATED ORAL EVERY 6 HOURS
Qty: 0 | Refills: 0 | Status: DISCONTINUED | OUTPATIENT
Start: 2017-04-26 | End: 2017-04-29

## 2017-04-26 RX ORDER — PRAMIPEXOLE DIHYDROCHLORIDE 0.12 MG/1
0.25 TABLET ORAL THREE TIMES A DAY
Qty: 0 | Refills: 0 | Status: DISCONTINUED | OUTPATIENT
Start: 2017-04-26 | End: 2017-04-27

## 2017-04-26 RX ORDER — ACETAMINOPHEN 500 MG
1000 TABLET ORAL ONCE
Qty: 0 | Refills: 0 | Status: DISCONTINUED | OUTPATIENT
Start: 2017-04-26 | End: 2017-04-26

## 2017-04-26 RX ADMIN — SODIUM CHLORIDE 100 MILLILITER(S): 9 INJECTION, SOLUTION INTRAVENOUS at 22:42

## 2017-04-26 RX ADMIN — CARBIDOPA AND LEVODOPA 1 TABLET(S): 25; 100 TABLET ORAL at 22:40

## 2017-04-26 RX ADMIN — HYDROMORPHONE HYDROCHLORIDE 0.5 MILLIGRAM(S): 2 INJECTION INTRAMUSCULAR; INTRAVENOUS; SUBCUTANEOUS at 23:02

## 2017-04-26 RX ADMIN — Medication 400 MILLIGRAM(S): at 15:16

## 2017-04-26 RX ADMIN — HYDROMORPHONE HYDROCHLORIDE 0.5 MILLIGRAM(S): 2 INJECTION INTRAMUSCULAR; INTRAVENOUS; SUBCUTANEOUS at 23:45

## 2017-04-26 RX ADMIN — Medication 200 MILLIGRAM(S): at 22:32

## 2017-04-26 RX ADMIN — CELECOXIB 200 MILLIGRAM(S): 200 CAPSULE ORAL at 11:53

## 2017-04-26 RX ADMIN — HYDROMORPHONE HYDROCHLORIDE 0.5 MILLIGRAM(S): 2 INJECTION INTRAMUSCULAR; INTRAVENOUS; SUBCUTANEOUS at 23:18

## 2017-04-26 RX ADMIN — Medication 400 MILLIGRAM(S): at 22:30

## 2017-04-26 RX ADMIN — PRAMIPEXOLE DIHYDROCHLORIDE 0.25 MILLIGRAM(S): 0.12 TABLET ORAL at 22:41

## 2017-04-26 RX ADMIN — Medication 1000 MILLIGRAM(S): at 17:06

## 2017-04-26 RX ADMIN — APREPITANT 40 MILLIGRAM(S): 80 CAPSULE ORAL at 11:53

## 2017-04-26 RX ADMIN — Medication 1000 MILLIGRAM(S): at 23:00

## 2017-04-26 NOTE — CONSULT NOTE ADULT - PROBLEM SELECTOR RECOMMENDATION 5
Patient is at high risk for VTE, will receive pharmacological DVT prophylaxis as per orthopedic surgery team. MCV of 102.3, was normal 2 moths ago, of unclear cause, patient is unaware of any hematological problems, will repeat in am, explained to the patient the need to f/u with a hematologist as an outpatient if result of repeat test is still abnormal. she understands & agrees.

## 2017-04-26 NOTE — CONSULT NOTE ADULT - PROBLEM SELECTOR RECOMMENDATION 6
Patient is at high risk for VTE, will receive pharmacological DVT prophylaxis as per orthopedic surgery team.

## 2017-04-26 NOTE — CONSULT NOTE ADULT - PROBLEM SELECTOR RECOMMENDATION 4
on Zetia which is non formulary, patient can use her own. on Zetia which is non formulary, patient will use her own.

## 2017-04-26 NOTE — CONSULT NOTE ADULT - SUBJECTIVE AND OBJECTIVE BOX
This is a 65 y/o F with [facility] This is a 67 y/o F with PMH of Dyslipidemia, Parkinson Disease, Hypothyroidism, OA, and Morbid Obesity presented for Revision of left total knee replacement. Routine post-op medical evaluation was requested. Patient denies any chest pain, SOB, palpitations, dizziness, headache, paraesthesias, or focal muscle weakness.     Allergies: no Known Allergies.    Surgical History:   x 2, knee replacement  bilateral, knee surgery (cement spacer).    Social History: , retired, lives with family, non smoker, no ETOH, no drug abuse.  Family History : Father & mother had heart disease.  Review of systems:  CONSTITUTIONAL: No fever, weight loss, or fatigue.  EYES: No eye pain, visual disturbances, or discharge.  ENMT:  No difficulty hearing, tinnitus, vertigo; No sinus or throat pain.  NECK: No pain or stiffness.  RESPIRATORY: No cough, wheezing, or hemoptysis; No shortness of breath.  CARDIOVASCULAR: No chest pain, palpitations, dizziness, reports B/L leg swelling.  GASTROINTESTINAL: No abdominal or epigastric pain. No nausea, vomiting, or hematemesis; No diarrhea or Change in bowel habits. No melena or hematochezia.  GENITOURINARY: No dysuria, frequency, hematuria, or incontinence.  NEUROLOGICAL: No headaches, (+) tremors.  SKIN: No itching, burning, rashes, or lesions.  MUSCULOSKELETAL: No muscle or back pain, (+) left knee pain.  PSYCHIATRIC: No depression, anxiety, or agitation.  HEME/LYMPH: No easy bruising, bleeding gums, or nose bleed.  ALLERGY AND IMMUNOLOGIC: No hives or eczema.      Vital Signs Last 24 Hrs  T(C): 36.7, Max: 36.7 (- @ 22:20)  T(F): 98, Max: 98 (- @ 22:20)  HR: 90 (72 - 104)  BP: 133/60 (95/46 - 153/69)  BP(mean): --  RR: 12 (10 - 20)  SpO2: 97% (95% - 100%)      Physical Exam:  GENERAL: NAD, well-groomed, well-developed.  HEAD:  Atraumatic, Normocephalic.  EYES: PERRLA, conjunctiva clear.  ENMT: no nasal discharge, MMM   NECK: Supple, No JVD.  NERVOUS SYSTEM:  Alert & oriented X3, neurologically intact grossly.  CHEST/LUNG: Good air entry B/L, no rales, rhonchi, or wheezing.  HEART: Normal S1 & S2, Grade 2/6 JANKI max on cardiac base propagated to the apex, no extra sounds.  ABDOMEN: Soft, non tender, non distended; bowel sounds present, no palpable masses or organomegaly.  EXTREMITIES:  No clubbing, cyanosis, (+) soft pitting edema B/L.  VASCULAR: 2+ radial, carotid  pulses B/L, no carotid bruits.  SKIN: No rashes or lesions.  PSYCH: normal affect & behavior.        Labs:                            11.8   11.4  )-----------( 289      ( 2017 23:13 )             36.0            04-    140  |  105  |  22  ----------------------------<  182<H>  4.2   |  28  |  1.05    Ca    9.2      2017 23:13            EKG (done on 2017) as per my review show SR at 90/min, normal OK & QTc intervals, normal QRS voltage, duration, and axis ( +60), with late transition, no ST-T abnormality.

## 2017-04-26 NOTE — CONSULT NOTE ADULT - ASSESSMENT
Asymptomatic post-op 65 y/o F with PMH of Dyslipidemia, Parkinson Disease, Hypothyroidism, OA, and Morbid Obesity.

## 2017-04-26 NOTE — CONSULT NOTE ADULT - PROBLEM SELECTOR RECOMMENDATION 9
Patient education for use of incentive spirometry, IVF hydration, patient is on Triamterene/HCTZ 37.5/25 daily at home, will hold for now. Post-op pain control and activity/weight bearing as per orthopedic surgery team.

## 2017-04-27 ENCOUNTER — TRANSCRIPTION ENCOUNTER (OUTPATIENT)
Age: 67
End: 2017-04-27

## 2017-04-27 DIAGNOSIS — G20 PARKINSON'S DISEASE: ICD-10-CM

## 2017-04-27 DIAGNOSIS — D75.89 OTHER SPECIFIED DISEASES OF BLOOD AND BLOOD-FORMING ORGANS: ICD-10-CM

## 2017-04-27 DIAGNOSIS — Z96.652 PRESENCE OF LEFT ARTIFICIAL KNEE JOINT: ICD-10-CM

## 2017-04-27 DIAGNOSIS — E78.5 HYPERLIPIDEMIA, UNSPECIFIED: ICD-10-CM

## 2017-04-27 DIAGNOSIS — E03.9 HYPOTHYROIDISM, UNSPECIFIED: ICD-10-CM

## 2017-04-27 DIAGNOSIS — Z29.9 ENCOUNTER FOR PROPHYLACTIC MEASURES, UNSPECIFIED: ICD-10-CM

## 2017-04-27 LAB
ANION GAP SERPL CALC-SCNC: 7 MMOL/L — SIGNIFICANT CHANGE UP (ref 5–17)
BUN SERPL-MCNC: 24 MG/DL — HIGH (ref 7–23)
CALCIUM SERPL-MCNC: 9.4 MG/DL — SIGNIFICANT CHANGE UP (ref 8.4–10.5)
CHLORIDE SERPL-SCNC: 105 MMOL/L — SIGNIFICANT CHANGE UP (ref 96–108)
CO2 SERPL-SCNC: 30 MMOL/L — SIGNIFICANT CHANGE UP (ref 22–31)
CREAT SERPL-MCNC: 1.06 MG/DL — SIGNIFICANT CHANGE UP (ref 0.5–1.3)
GLUCOSE SERPL-MCNC: 142 MG/DL — HIGH (ref 70–99)
HCT VFR BLD CALC: 34 % — LOW (ref 34.5–45)
HGB BLD-MCNC: 11.2 G/DL — LOW (ref 11.5–15.5)
MCHC RBC-ENTMCNC: 32.9 GM/DL — SIGNIFICANT CHANGE UP (ref 32–36)
MCHC RBC-ENTMCNC: 33.8 PG — SIGNIFICANT CHANGE UP (ref 27–34)
MCV RBC AUTO: 102.7 FL — HIGH (ref 80–100)
PLATELET # BLD AUTO: 240 K/UL — SIGNIFICANT CHANGE UP (ref 150–400)
POTASSIUM SERPL-MCNC: 5.2 MMOL/L — SIGNIFICANT CHANGE UP (ref 3.5–5.3)
POTASSIUM SERPL-SCNC: 5.2 MMOL/L — SIGNIFICANT CHANGE UP (ref 3.5–5.3)
RBC # BLD: 3.31 M/UL — LOW (ref 3.8–5.2)
RBC # FLD: 14.8 % — HIGH (ref 10.3–14.5)
SODIUM SERPL-SCNC: 142 MMOL/L — SIGNIFICANT CHANGE UP (ref 135–145)
WBC # BLD: 10.5 K/UL — SIGNIFICANT CHANGE UP (ref 3.8–10.5)
WBC # FLD AUTO: 10.5 K/UL — SIGNIFICANT CHANGE UP (ref 3.8–10.5)

## 2017-04-27 PROCEDURE — 99233 SBSQ HOSP IP/OBS HIGH 50: CPT

## 2017-04-27 RX ORDER — CHOLECALCIFEROL (VITAMIN D3) 125 MCG
2000 CAPSULE ORAL DAILY
Qty: 0 | Refills: 0 | Status: DISCONTINUED | OUTPATIENT
Start: 2017-04-27 | End: 2017-04-27

## 2017-04-27 RX ORDER — CEFAZOLIN SODIUM 1 G
2000 VIAL (EA) INJECTION EVERY 8 HOURS
Qty: 0 | Refills: 0 | Status: COMPLETED | OUTPATIENT
Start: 2017-04-27 | End: 2017-04-27

## 2017-04-27 RX ORDER — CARBIDOPA AND LEVODOPA 25; 100 MG/1; MG/1
1 TABLET ORAL ONCE
Qty: 0 | Refills: 0 | Status: COMPLETED | OUTPATIENT
Start: 2017-04-27 | End: 2017-04-27

## 2017-04-27 RX ORDER — APIXABAN 2.5 MG/1
1 TABLET, FILM COATED ORAL
Qty: 20 | Refills: 0
Start: 2017-04-27 | End: 2017-05-07

## 2017-04-27 RX ORDER — EZETIMIBE 10 MG/1
10 TABLET ORAL AT BEDTIME
Qty: 0 | Refills: 0 | Status: DISCONTINUED | OUTPATIENT
Start: 2017-04-27 | End: 2017-04-29

## 2017-04-27 RX ORDER — HYDROMORPHONE HYDROCHLORIDE 2 MG/ML
0.5 INJECTION INTRAMUSCULAR; INTRAVENOUS; SUBCUTANEOUS
Qty: 0 | Refills: 0 | Status: DISCONTINUED | OUTPATIENT
Start: 2017-04-27 | End: 2017-04-27

## 2017-04-27 RX ORDER — OXYCODONE HYDROCHLORIDE 5 MG/1
10 TABLET ORAL
Qty: 0 | Refills: 0 | Status: DISCONTINUED | OUTPATIENT
Start: 2017-04-27 | End: 2017-04-29

## 2017-04-27 RX ORDER — BENZOCAINE AND MENTHOL 5; 1 G/100ML; G/100ML
1 LIQUID ORAL EVERY 4 HOURS
Qty: 0 | Refills: 0 | Status: DISCONTINUED | OUTPATIENT
Start: 2017-04-27 | End: 2017-04-29

## 2017-04-27 RX ORDER — LEVOTHYROXINE SODIUM 125 MCG
150 TABLET ORAL DAILY
Qty: 0 | Refills: 0 | Status: DISCONTINUED | OUTPATIENT
Start: 2017-04-27 | End: 2017-04-29

## 2017-04-27 RX ORDER — OXYCODONE HYDROCHLORIDE 5 MG/1
5 TABLET ORAL
Qty: 0 | Refills: 0 | Status: DISCONTINUED | OUTPATIENT
Start: 2017-04-27 | End: 2017-04-29

## 2017-04-27 RX ORDER — CHOLECALCIFEROL (VITAMIN D3) 125 MCG
2000 CAPSULE ORAL DAILY
Qty: 0 | Refills: 0 | Status: DISCONTINUED | OUTPATIENT
Start: 2017-04-27 | End: 2017-04-29

## 2017-04-27 RX ORDER — CARBIDOPA AND LEVODOPA 25; 100 MG/1; MG/1
1 TABLET ORAL THREE TIMES A DAY
Qty: 0 | Refills: 0 | Status: DISCONTINUED | OUTPATIENT
Start: 2017-04-27 | End: 2017-04-29

## 2017-04-27 RX ORDER — TRANEXAMIC ACID 100 MG/ML
1000 INJECTION, SOLUTION INTRAVENOUS ONCE
Qty: 0 | Refills: 0 | Status: DISCONTINUED | OUTPATIENT
Start: 2017-04-27 | End: 2017-04-27

## 2017-04-27 RX ORDER — APIXABAN 2.5 MG/1
2.5 TABLET, FILM COATED ORAL EVERY 12 HOURS
Qty: 0 | Refills: 0 | Status: DISCONTINUED | OUTPATIENT
Start: 2017-04-27 | End: 2017-04-29

## 2017-04-27 RX ORDER — SODIUM CHLORIDE 9 MG/ML
1000 INJECTION, SOLUTION INTRAVENOUS
Qty: 0 | Refills: 0 | Status: DISCONTINUED | OUTPATIENT
Start: 2017-04-27 | End: 2017-04-27

## 2017-04-27 RX ORDER — PRAMIPEXOLE DIHYDROCHLORIDE 0.12 MG/1
0.25 TABLET ORAL THREE TIMES A DAY
Qty: 0 | Refills: 0 | Status: DISCONTINUED | OUTPATIENT
Start: 2017-04-27 | End: 2017-04-29

## 2017-04-27 RX ORDER — HYDROMORPHONE HYDROCHLORIDE 2 MG/ML
0.5 INJECTION INTRAMUSCULAR; INTRAVENOUS; SUBCUTANEOUS
Qty: 0 | Refills: 0 | Status: DISCONTINUED | OUTPATIENT
Start: 2017-04-27 | End: 2017-04-29

## 2017-04-27 RX ORDER — PRAMIPEXOLE DIHYDROCHLORIDE 0.12 MG/1
0.25 TABLET ORAL ONCE
Qty: 0 | Refills: 0 | Status: COMPLETED | OUTPATIENT
Start: 2017-04-27 | End: 2017-04-27

## 2017-04-27 RX ORDER — CEFAZOLIN SODIUM 1 G
2000 VIAL (EA) INJECTION
Qty: 0 | Refills: 0 | Status: DISCONTINUED | OUTPATIENT
Start: 2017-04-27 | End: 2017-04-27

## 2017-04-27 RX ORDER — CELECOXIB 200 MG/1
200 CAPSULE ORAL
Qty: 0 | Refills: 0 | Status: DISCONTINUED | OUTPATIENT
Start: 2017-04-27 | End: 2017-04-29

## 2017-04-27 RX ADMIN — Medication 100 MILLIGRAM(S): at 11:49

## 2017-04-27 RX ADMIN — CELECOXIB 200 MILLIGRAM(S): 200 CAPSULE ORAL at 17:56

## 2017-04-27 RX ADMIN — OXYCODONE HYDROCHLORIDE 5 MILLIGRAM(S): 5 TABLET ORAL at 09:24

## 2017-04-27 RX ADMIN — Medication 1000 MILLIGRAM(S): at 17:56

## 2017-04-27 RX ADMIN — CELECOXIB 200 MILLIGRAM(S): 200 CAPSULE ORAL at 09:36

## 2017-04-27 RX ADMIN — Medication 1000 MILLIGRAM(S): at 17:57

## 2017-04-27 RX ADMIN — OXYCODONE HYDROCHLORIDE 10 MILLIGRAM(S): 5 TABLET ORAL at 21:13

## 2017-04-27 RX ADMIN — SENNA PLUS 2 TABLET(S): 8.6 TABLET ORAL at 21:15

## 2017-04-27 RX ADMIN — PRAMIPEXOLE DIHYDROCHLORIDE 0.25 MILLIGRAM(S): 0.12 TABLET ORAL at 13:11

## 2017-04-27 RX ADMIN — Medication 100 MILLIGRAM(S): at 21:15

## 2017-04-27 RX ADMIN — OXYCODONE HYDROCHLORIDE 5 MILLIGRAM(S): 5 TABLET ORAL at 11:52

## 2017-04-27 RX ADMIN — EZETIMIBE 10 MILLIGRAM(S): 10 TABLET ORAL at 21:16

## 2017-04-27 RX ADMIN — PRAMIPEXOLE DIHYDROCHLORIDE 0.25 MILLIGRAM(S): 0.12 TABLET ORAL at 21:14

## 2017-04-27 RX ADMIN — APIXABAN 2.5 MILLIGRAM(S): 2.5 TABLET, FILM COATED ORAL at 09:59

## 2017-04-27 RX ADMIN — CELECOXIB 200 MILLIGRAM(S): 200 CAPSULE ORAL at 08:37

## 2017-04-27 RX ADMIN — Medication 100 MILLIGRAM(S): at 05:32

## 2017-04-27 RX ADMIN — CELECOXIB 200 MILLIGRAM(S): 200 CAPSULE ORAL at 17:57

## 2017-04-27 RX ADMIN — Medication 100 MILLIGRAM(S): at 03:00

## 2017-04-27 RX ADMIN — Medication 400 MILLIGRAM(S): at 03:42

## 2017-04-27 RX ADMIN — Medication 400 MILLIGRAM(S): at 09:59

## 2017-04-27 RX ADMIN — CARBIDOPA AND LEVODOPA 1 TABLET(S): 25; 100 TABLET ORAL at 13:06

## 2017-04-27 RX ADMIN — CARBIDOPA AND LEVODOPA 1 TABLET(S): 25; 100 TABLET ORAL at 05:32

## 2017-04-27 RX ADMIN — CARBIDOPA AND LEVODOPA 1 TABLET(S): 25; 100 TABLET ORAL at 21:14

## 2017-04-27 RX ADMIN — Medication 2000 UNIT(S): at 11:49

## 2017-04-27 RX ADMIN — Medication 100 MILLIGRAM(S): at 13:06

## 2017-04-27 RX ADMIN — OXYCODONE HYDROCHLORIDE 10 MILLIGRAM(S): 5 TABLET ORAL at 21:43

## 2017-04-27 RX ADMIN — Medication 1000 MILLIGRAM(S): at 03:46

## 2017-04-27 RX ADMIN — OXYCODONE HYDROCHLORIDE 5 MILLIGRAM(S): 5 TABLET ORAL at 12:30

## 2017-04-27 RX ADMIN — PRAMIPEXOLE DIHYDROCHLORIDE 0.25 MILLIGRAM(S): 0.12 TABLET ORAL at 05:33

## 2017-04-27 RX ADMIN — Medication 1000 MILLIGRAM(S): at 10:03

## 2017-04-27 RX ADMIN — PANTOPRAZOLE SODIUM 40 MILLIGRAM(S): 20 TABLET, DELAYED RELEASE ORAL at 05:33

## 2017-04-27 RX ADMIN — Medication 150 MICROGRAM(S): at 05:32

## 2017-04-27 RX ADMIN — APIXABAN 2.5 MILLIGRAM(S): 2.5 TABLET, FILM COATED ORAL at 21:15

## 2017-04-27 RX ADMIN — OXYCODONE HYDROCHLORIDE 5 MILLIGRAM(S): 5 TABLET ORAL at 08:37

## 2017-04-27 RX ADMIN — BENZOCAINE AND MENTHOL 1 LOZENGE: 5; 1 LIQUID ORAL at 10:03

## 2017-04-27 NOTE — DISCHARGE NOTE ADULT - MEDICATION SUMMARY - MEDICATIONS TO STOP TAKING
I will STOP taking the medications listed below when I get home from the hospital:    Pepcid 20 mg oral tablet  --  by mouth twice pre op

## 2017-04-27 NOTE — OCCUPATIONAL THERAPY INITIAL EVALUATION ADULT - ORIENTATION, REHAB EVAL
oriented to person, place, time and situation/Patient educated verbally regarding role of OT, LE weight bearing status & pt. provided with education folder including TKR education (+ Knee immobilizer at all times OOB) & caregiver guide pamphlet.

## 2017-04-27 NOTE — PHYSICAL THERAPY INITIAL EVALUATION ADULT - ACTIVE RANGE OF MOTION EXAMINATION, REHAB EVAL
left knee ROM not tested yet, pt with immobilizer, awaiting specific orders regarding ROM/deficits as listed below

## 2017-04-27 NOTE — DISCHARGE NOTE ADULT - HOSPITAL COURSE
This patient was admitted to [facility] with a history of bilateral total knee replacements in 2016. Due to infection, left TKR was removed in 2/2017 and cement spacer was inserted. Patient is now admitted for removal of cement spacer and reimplantation of left TKR.  Patient went to Pre-Surgical Testing at [facility] and was medically cleared to undergo elective procedure.  No operative or karis-operative complications arose during patients hospital course.  Patient received antibiotic according to SCIP guidelines for infection prevention. Eliquis was given for DVT prophylaxis.  Anesthesia, Medical Hospitalist, Physical Therapy and Occupational Therapy were consulted. Patient is stable for discharge with a good prognosis.  Appropriate discharge instructions and medications are provided in this document. This patient was admitted to [facility] with a history of bilateral total knee replacements in 2016. Due to infection, left TKR was removed in 2/2017 and cement spacer was inserted. Patient is now admitted for removal of cement spacer and reimplantation of left TKR.  Patient went to Pre-Surgical Testing at [facility] and was medically cleared to undergo elective procedure.  No operative or karis-operative complications arose during patients hospital course.  Patient received antibiotic according to SCIP guidelines for infection prevention. Eliquis was given for DVT prophylaxis.  Anesthesia, Medical Hospitalist, Physical Therapy and Occupational Therapy were consulted. Patient is stable for discharge home with a good prognosis.  Appropriate discharge instructions and medications are provided in this document.

## 2017-04-27 NOTE — DISCHARGE NOTE ADULT - PLAN OF CARE
Improve quality of life Physical Therapy/Occupational Therapy for: ambulation, transfers, stairs, ADL's (activities of daily living), range of motion exercises, and isometrics  -Activity  • Weight Bearing as tolerated with rolling walker.  • Take short, frequent walks increasing the distance that you walk each day as tolerated.  • Change your position every hour to decrease pain and stiffness.  • Continue the exercises taught to you by your physical therapist.  • No driving until cleared by the doctor.  • No tub baths, hot tubs, or swimming pools until instructed by your doctor.  • Do not squat down on the floor.  • Do not kneel or twist your knee.  • Range of Motion Goals: Flexion= 120 degrees, Extension = 0 degrees  Keep incision clean and dry. May shower 5 days after surgery if no drainage from incision.  Suture/Prineo removal 2 weeks after surgery at Surgeon's office. Call your doctor if you experience:  • An increase in pain not controlled by pain medication or change in activity or  position.  • Temperature greater than 101° F.  • Redness, increased swelling or foul smelling drainage from or around the  incision.  • Numbness, tingling or a change in color or temperature of the operative leg.  • Call your doctor immediately if you experience chest pain, shortness of breath or calf pain.    For Constipation :   • Increase your water intake. Drink at least 8 glasses of water daily.  • Try adding fiber to your diet by eating fruits, vegetables and foods that are rich in grains.  • If you do experience constipation, you may take an over-the-counter stool softener/laxative such as Colace, Senokot or Milk of Magnesia. Keep knee in extension Wear knee immobilizer or bled wu brace locked in extension at all time x 2 weeks    will re evaluate at the post op appointment hold antihypertensive meds- triamterene-hydroCHLOROthiazide due to low BP. revaluate by PMD in 1 week.

## 2017-04-27 NOTE — PROGRESS NOTE ADULT - SUBJECTIVE AND OBJECTIVE BOX
Post Op Day # 1    SUBJECTIVE    66y y.o. Female status post removal of cement spacer and conversion to left TKR .   Patient is alert and comfortable.    Pain is controlled with current pain regimen.  Denies nausea, vomiting, chest pain, shortness of breath, abdominal pain or fever.   No new complaints.    OBJECTIVE    Vital Signs Last 24 Hrs  T(C): 36.3, Max: 36.7 (04-26 @ 22:20)  T(F): 97.4, Max: 98 (04-26 @ 22:20)  HR: 83 (72 - 104)  BP: 99/60 (95/46 - 153/69)  BP(mean): --  RR: 16 (10 - 20)  SpO2: 97% (95% - 100%)  I&O's Summary    I & Os for current day (as of 27 Apr 2017 08:30)  =============================================  IN: 1300 ml / OUT: 475 ml / NET: 825 ml      Left knee dressing is clean, dry and intact.   No erythema/ No exudate/ No blistering/ No ecchymosis.   The calf is supple/nontender.   Passive range of motion is acceptable to due postoperative pain.   Sensation to light touch is grossly intact distally.   The lateral cutaneous nerve is intact.   Motor function distally is intact.   No foot drop.   (2+) dorsalis pedis pulse. Capillary refill is less than 2 seconds. No cyanosis.                          11.2<L>  10.5  )-----------( 240      ( 27 Apr 2017 07:19 )             34.0<L>  27 Apr 2017 07:19                        11.8   11.4<H> )-----------( 289      ( 26 Apr 2017 23:13 )             36.0   26 Apr 2017 23:13    27 Apr 2017 07:19    142    |  105    |  24<H>  ----------------------------<  142<H>  5.2     |  30     |  1.06   26 Apr 2017 23:13    140    |  105    |  22     ----------------------------<  182<H>  4.2     |  28     |  1.05     Ca    9.4        27 Apr 2017 07:19  Ca    9.2        26 Apr 2017 23:13        ASSESSMENT AND PLAN    - Orthopedically stable  - DVT prophylaxis: PAS + Eliquis 2.5mg PO twice daily  - Continue physical therapy and occupational therapy  - Weight bearing as tolerated of the left lower extremity with assistance of a walker  - Incentive spirometry encouraged  - Pain control as clinically indicated  - Disposition: TBA pending PT evaluation Post Op Day # 1    SUBJECTIVE    66y y.o. Female status post removal of cement spacer and conversion to left TKR .   Patient is alert and comfortable.    Pain is controlled with current pain regimen.  Denies nausea, vomiting, chest pain, shortness of breath, abdominal pain or fever.   No new complaints.    OBJECTIVE    Vital Signs Last 24 Hrs  T(C): 36.3, Max: 36.7 (04-26 @ 22:20)  T(F): 97.4, Max: 98 (04-26 @ 22:20)  HR: 83 (72 - 104)  BP: 99/60 (95/46 - 153/69)  BP(mean): --  RR: 16 (10 - 20)  SpO2: 97% (95% - 100%)  I&O's Summary    I & Os for current day (as of 27 Apr 2017 08:30)  =============================================  IN: 1300 ml / OUT: 475 ml / NET: 825 ml      Left knee dressing is clean, dry and intact.   No erythema/ No exudate/ No blistering/ No ecchymosis.   The calf is supple/nontender.   Passive range of motion is acceptable to due postoperative pain.   Sensation to light touch is grossly intact distally.   The lateral cutaneous nerve is intact.   Motor function distally is intact.   No foot drop.   (2+) dorsalis pedis pulse. Capillary refill is less than 2 seconds. No cyanosis.                          11.2<L>  10.5  )-----------( 240      ( 27 Apr 2017 07:19 )             34.0<L>  27 Apr 2017 07:19                        11.8   11.4<H> )-----------( 289      ( 26 Apr 2017 23:13 )             36.0   26 Apr 2017 23:13    27 Apr 2017 07:19    142    |  105    |  24<H>  ----------------------------<  142<H>  5.2     |  30     |  1.06   26 Apr 2017 23:13    140    |  105    |  22     ----------------------------<  182<H>  4.2     |  28     |  1.05     Ca    9.4        27 Apr 2017 07:19  Ca    9.2        26 Apr 2017 23:13        ASSESSMENT AND PLAN    - Orthopedically stable  - DVT prophylaxis: PAS + Eliquis 2.5mg PO twice daily  - Continue physical therapy and occupational therapy  - Weight bearing as tolerated of the left lower extremity with assistance of a walker. Knee immobilizer at all times when ambulating.  - Incentive spirometry encouraged  - Pain control as clinically indicated  - Disposition: TBA pending PT evaluation

## 2017-04-27 NOTE — DISCHARGE NOTE ADULT - CARE PROVIDER_API CALL
Soy Oh  [address],   [address]  [place] [address]  Tel: [phone]  Fax: [phone]  Phone: (   )    -  Fax: (   )    -

## 2017-04-27 NOTE — PHYSICAL THERAPY INITIAL EVALUATION ADULT - ADDITIONAL COMMENTS
Pt lives with  in a house w/ 1 step to enter and no steps inside. Pt has equipment from previous surgery, has rolling walker. Pt was using rolling walker prior to this surgery, had spacer in left knee

## 2017-04-27 NOTE — DISCHARGE NOTE ADULT - CARE PLAN
Principal Discharge DX:	Status post total left knee replacement  Goal:	Improve quality of life  Instructions for follow-up, activity and diet:	Physical Therapy/Occupational Therapy for: ambulation, transfers, stairs, ADL's (activities of daily living), range of motion exercises, and isometrics  -Activity  • Weight Bearing as tolerated with rolling walker.  • Take short, frequent walks increasing the distance that you walk each day as tolerated.  • Change your position every hour to decrease pain and stiffness.  • Continue the exercises taught to you by your physical therapist.  • No driving until cleared by the doctor.  • No tub baths, hot tubs, or swimming pools until instructed by your doctor.  • Do not squat down on the floor.  • Do not kneel or twist your knee.  • Range of Motion Goals: Flexion= 120 degrees, Extension = 0 degrees  Keep incision clean and dry. May shower 5 days after surgery if no drainage from incision.  Suture/Prineo removal 2 weeks after surgery at Surgeon's office.  Instructions for follow-up, activity and diet:	Call your doctor if you experience:  • An increase in pain not controlled by pain medication or change in activity or  position.  • Temperature greater than 101° F.  • Redness, increased swelling or foul smelling drainage from or around the  incision.  • Numbness, tingling or a change in color or temperature of the operative leg.  • Call your doctor immediately if you experience chest pain, shortness of breath or calf pain.    For Constipation :   • Increase your water intake. Drink at least 8 glasses of water daily.  • Try adding fiber to your diet by eating fruits, vegetables and foods that are rich in grains.  • If you do experience constipation, you may take an over-the-counter stool softener/laxative such as Colace, Senokot or Milk of Magnesia. Principal Discharge DX:	Status post total left knee replacement  Goal:	Improve quality of life  Instructions for follow-up, activity and diet:	Physical Therapy/Occupational Therapy for: ambulation, transfers, stairs, ADL's (activities of daily living), range of motion exercises, and isometrics  -Activity  • Weight Bearing as tolerated with rolling walker.  • Take short, frequent walks increasing the distance that you walk each day as tolerated.  • Change your position every hour to decrease pain and stiffness.  • Continue the exercises taught to you by your physical therapist.  • No driving until cleared by the doctor.  • No tub baths, hot tubs, or swimming pools until instructed by your doctor.  • Do not squat down on the floor.  • Do not kneel or twist your knee.  • Range of Motion Goals: Flexion= 120 degrees, Extension = 0 degrees  Keep incision clean and dry. May shower 5 days after surgery if no drainage from incision.  Suture/Prineo removal 2 weeks after surgery at Surgeon's office.  Instructions for follow-up, activity and diet:	Call your doctor if you experience:  • An increase in pain not controlled by pain medication or change in activity or  position.  • Temperature greater than 101° F.  • Redness, increased swelling or foul smelling drainage from or around the  incision.  • Numbness, tingling or a change in color or temperature of the operative leg.  • Call your doctor immediately if you experience chest pain, shortness of breath or calf pain.    For Constipation :   • Increase your water intake. Drink at least 8 glasses of water daily.  • Try adding fiber to your diet by eating fruits, vegetables and foods that are rich in grains.  • If you do experience constipation, you may take an over-the-counter stool softener/laxative such as Colace, Senokot or Milk of Magnesia.  Goal:	Keep knee in extension  Instructions for follow-up, activity and diet:	Wear knee immobilizer or bled wu brace locked in extension at all time x 2 weeks    will re evaluate at the post op appointment Principal Discharge DX:	Status post total left knee replacement  Goal:	Improve quality of life  Instructions for follow-up, activity and diet:	Physical Therapy/Occupational Therapy for: ambulation, transfers, stairs, ADL's (activities of daily living), range of motion exercises, and isometrics  -Activity  • Weight Bearing as tolerated with rolling walker.  • Take short, frequent walks increasing the distance that you walk each day as tolerated.  • Change your position every hour to decrease pain and stiffness.  • Continue the exercises taught to you by your physical therapist.  • No driving until cleared by the doctor.  • No tub baths, hot tubs, or swimming pools until instructed by your doctor.  • Do not squat down on the floor.  • Do not kneel or twist your knee.  • Range of Motion Goals: Flexion= 120 degrees, Extension = 0 degrees  Keep incision clean and dry. May shower 5 days after surgery if no drainage from incision.  Suture/Prineo removal 2 weeks after surgery at Surgeon's office.  Secondary Diagnosis:	Dyslipidemia  Instructions for follow-up, activity and diet:	Call your doctor if you experience:  • An increase in pain not controlled by pain medication or change in activity or  position.  • Temperature greater than 101° F.  • Redness, increased swelling or foul smelling drainage from or around the  incision.  • Numbness, tingling or a change in color or temperature of the operative leg.  • Call your doctor immediately if you experience chest pain, shortness of breath or calf pain.    For Constipation :   • Increase your water intake. Drink at least 8 glasses of water daily.  • Try adding fiber to your diet by eating fruits, vegetables and foods that are rich in grains.  • If you do experience constipation, you may take an over-the-counter stool softener/laxative such as Colace, Senokot or Milk of Magnesia.  Secondary Diagnosis:	Hypothyroidism, unspecified type  Goal:	Keep knee in extension  Instructions for follow-up, activity and diet:	Wear knee immobilizer or bled wu brace locked in extension at all time x 2 weeks    will re evaluate at the post op appointment  Secondary Diagnosis:	Parkinsons disease  Secondary Diagnosis:	Essential hypertension  Instructions for follow-up, activity and diet:	hold antihypertensive meds- triamterene-hydroCHLOROthiazide due to low BP. revaluate by PMD in 1 week.

## 2017-04-27 NOTE — DISCHARGE NOTE ADULT - NS AS ACTIVITY OBS
Stairs allowed/Walking-Outdoors allowed/Do not drive or operate machinery/Walking-Indoors allowed/No Heavy lifting/straining/Showering allowed Walking-Outdoors allowed/Do not drive or operate machinery/Do not make important decisions/No Heavy lifting/straining/Showering allowed/Stairs allowed/Walking-Indoors allowed

## 2017-04-27 NOTE — DIETITIAN INITIAL EVALUATION ADULT. - OTHER INFO
66F s/p L TKR. Reports pain is well controlled. Appetite and intake good on regular diet. Tolerating consistency well. Pt referred for >BMI, pt reports she has had success wt loss with weight watchers in the past and plans to continue once recovered. Denies nutrition related questions/interest in diet edu at this time. Skin intact; rissa score 19. NKFA.

## 2017-04-27 NOTE — PROGRESS NOTE ADULT - ASSESSMENT
Asymptomatic post-op 67 y/o F with PMH of Dyslipidemia, Parkinson Disease, Hypothyroidism, OA, and Morbid Obesity.

## 2017-04-27 NOTE — PROGRESS NOTE ADULT - SUBJECTIVE AND OBJECTIVE BOX
Patient is a 66y old  Female who presents with a chief complaint of Removal of cement spacer left knee  Reimplantation left total knee replacement (27 Apr 2017 10:10)      INTERVAL HPI/OVERNIGHT EVENTS: feeling well. pain controlled.  no issues.     Pain Location & Control: left knee    MEDICATIONS  (STANDING):  lactated ringers. 1000milliLiter(s) IV Continuous <Continuous>  lactated ringers. 1000milliLiter(s) IV Continuous <Continuous>  pantoprazole    Tablet 40milliGRAM(s) Oral before breakfast  senna 2Tablet(s) Oral at bedtime  docusate sodium 100milliGRAM(s) Oral three times a day  apixaban 2.5milliGRAM(s) Oral every 12 hours  celecoxib 200milliGRAM(s) Oral two times a day with meals  pramipexole 0.25milliGRAM(s) Oral three times a day  carbidopa/levodopa  25/100 1Tablet(s) Oral three times a day  acetaminophen   Tablet. 1000milliGRAM(s) Oral every 8 hours  levothyroxine 150MICROGram(s) Oral daily  cholecalciferol 2000Unit(s) Oral daily  ezetimibe 10milliGRAM(s) Oral at bedtime    MEDICATIONS  (PRN):  aluminum hydroxide/magnesium hydroxide/simethicone Suspension 30milliLiter(s) Oral four times a day PRN Indigestion  ondansetron Injectable 4milliGRAM(s) IV Push every 6 hours PRN Nausea and/or Vomiting  magnesium hydroxide Suspension 30milliLiter(s) Oral daily PRN Constipation  polyethylene glycol 3350 17Gram(s) Oral daily PRN Constipation  oxyCODONE IR 5milliGRAM(s) Oral every 3 hours PRN Mild Pain  oxyCODONE IR 10milliGRAM(s) Oral every 3 hours PRN Moderate Pain  HYDROmorphone  Injectable 0.5milliGRAM(s) SubCutaneous every 3 hours PRN Severe Pain  benzocaine 15 mG/menthol 3.6 mG Lozenge 1Lozenge Oral every 4 hours PRN Sore Throat      Allergies    No Known Allergies      REVIEW OF SYSTEMS:  CONSTITUTIONAL: No fever, weight loss, or fatigue  EYES: No eye pain, visual disturbances, or discharge  ENMT:  No difficulty hearing, tinnitus, vertigo; No sinus or throat pain  NECK: No pain or stiffness  BREASTS: No pain, masses, or nipple discharge  RESPIRATORY: No cough, wheezing, chills or hemoptysis; No shortness of breath  CARDIOVASCULAR: No chest pain, palpitations, dizziness, or leg swelling  GASTROINTESTINAL: No abdominal or epigastric pain. No nausea, vomiting, or hematemesis; No diarrhea or constipation. No melena or hematochezia.  GENITOURINARY: No dysuria, frequency, hematuria, or incontinence  NEUROLOGICAL: No headaches, memory loss, loss of strength, numbness, or tremors  SKIN: No itching, burning, rashes, or lesions   LYMPH NODES: No enlarged glands  ENDOCRINE: No heat or cold intolerance; No hair loss; No polydipsia or polyuria  MUSCULOSKELETAL: No back pain  PSYCHIATRIC: No depression, anxiety, mood swings, or difficulty sleeping  HEME/LYMPH: No easy bruising, or bleeding gums  ALLERGY AND IMMUNOLOGIC: No hives or eczema    Vital Signs Last 24 Hrs  T(C): 36.7, Max: 36.7 (04-26 @ 22:20)  T(F): 98.1, Max: 98.1 (04-27 @ 11:00)  HR: 94 (72 - 104)  BP: 101/64 (95/46 - 153/69)  BP(mean): --  RR: 14 (10 - 20)  SpO2: 99% (95% - 100%)    PHYSICAL EXAM:  GENERAL: NAD, well-groomed, well-developed  HEAD:  Atraumatic, Normocephalic  EYES: EOMI, PERRLA, conjunctiva and sclera clear  ENMT: No tonsillar erythema, exudates, or enlargement; Moist mucous membranes, Good dentition, No lesions  NECK: Supple, No JVD, Normal thyroid  NERVOUS SYSTEM:  Alert & Oriented X3, Good concentration; Bilateral LE mobile, sensation to light touch intact  CHEST/LUNG: Clear to auscultation bilaterally; No rales, rhonchi, wheezing, or rubs  HEART: Regular rate and rhythm; No murmurs, rubs, or gallops  ABDOMEN: Soft, Nontender, Nondistended; Bowel sounds present  EXTREMITIES:  2+ Peripheral Pulses, No clubbing or cyanosis  LYMPH: No lymphadenopathy noted  SKIN: No rashes or lesions  INCISION:  Dressing dry and intact    LABS:                        11.2   10.5  )-----------( 240      ( 27 Apr 2017 07:19 )             34.0     27 Apr 2017 07:19    142    |  105    |  24     ----------------------------<  142    5.2     |  30     |  1.06     Ca    9.4        27 Apr 2017 07:19          CAPILLARY BLOOD GLUCOSE      RADIOLOGY & ADDITIONAL TESTS:    Imaging Personally Reviewed:  [ ] YES      Consultant(s) Notes Reviewed:     Care Discussed with Consultants/Other Providers:

## 2017-04-27 NOTE — DISCHARGE NOTE ADULT - REASON FOR ADMISSION
"Left knee stiffness s/p spacer due to infection Removal of cement spacer left knee  Reimplantation left total knee replacement

## 2017-04-27 NOTE — OCCUPATIONAL THERAPY INITIAL EVALUATION ADULT - ANTICIPATED DISCHARGE DISPOSITION, OT EVAL
home w/ level of assist/(HCOT/PT tba as per pt secondary limited with knee ROM + Knee immobilizer at this time, tba pending [facility] recommendation)

## 2017-04-27 NOTE — DISCHARGE NOTE ADULT - MEDICATION SUMMARY - MEDICATIONS TO TAKE
I will START or STAY ON the medications listed below when I get home from the hospital:    acetaminophen 500 mg oral tablet  -- 2 tab(s) by mouth every 8 hours x 12 days  -- Indication: For Pain    oxyCODONE 5 mg oral tablet  -- 1 tab(s) by mouth every 4 hours (5 times/day), As Needed, pain MDD:6  -- Indication: For Pain    apixaban 2.5 mg oral tablet  -- 1 tab(s) by mouth every 12 hours    dvt prophylaxis   -- Indication: For Start Ecotrin 325mg twice a day x4 weeks  once Eliquis is completed     Zetia 10 mg oral tablet  -- 1 tab(s) by mouth once a day  -- Indication: For HLD    triamterene-hydroCHLOROthiazide 37.5mg-25mg oral capsule  -- 1 cap(s) by mouth once a day  -- Indication: For HTN    pramipexole 0.25 mg oral tablet  -- 1 tab(s) by mouth 3 times a day  -- Indication: For Parkinsons disease    carbidopa-levodopa 25 mg-100 mg oral tablet  -- 1 tab(s) by mouth 3 times a day  -- Indication: For Parkinsons disease    docusate sodium 100 mg oral capsule  -- 1 cap(s) by mouth 3 times a day  -- Indication: For constipation    senna oral tablet  -- 2 tab(s) by mouth once a day (at bedtime)  -- Indication: For constipation    benzocaine-menthol 15 mg-3.6 mg mucous membrane lozenge  -- 1 lozenge mucous membrane 4 times a day, As Needed sore throat  -- Indication: For Sore throat    pantoprazole 40 mg oral delayed release tablet  -- 1 tab(s) by mouth once a day (before a meal)  -- Indication: For Acid reflux     levothyroxine 150 mcg (0.15 mg) oral tablet  -- 1 tab(s) by mouth once a day  -- Indication: For Hypothyrodism    Vitamin D3 2000 intl units oral capsule  -- 1 cap(s) by mouth once a day  -- Indication: For vitamin I will START or STAY ON the medications listed below when I get home from the hospital:    acetaminophen 500 mg oral tablet  -- 2 tab(s) by mouth every 8 hours x 12 days  -- Indication: For Pain    oxyCODONE 5 mg oral tablet  -- 1 tab(s) by mouth every 4 hours (5 times/day), As Needed, pain MDD:6  -- Indication: For Pain    oxyCODONE 5 mg oral tablet  -- 1 tab(s) by mouth every 4 hours, As Needed, Mild-mod Pain; 2 tabs by mouth every 4 hours as needed mod-severe pain MDD:6  -- Indication: For Pain    apixaban 2.5 mg oral tablet  -- 1 tab(s) by mouth every 12 hours    dvt prophylaxis   -- Indication: For Start Ecotrin 325mg twice a day x4 weeks  once Eliquis is completed     Zetia 10 mg oral tablet  -- 1 tab(s) by mouth once a day  -- Indication: For HLD    triamterene-hydroCHLOROthiazide 37.5mg-25mg oral capsule  -- 1 cap(s) by mouth once a day  -- Indication: For HTN    pramipexole 0.25 mg oral tablet  -- 1 tab(s) by mouth 3 times a day  -- Indication: For Parkinsons disease    carbidopa-levodopa 25 mg-100 mg oral tablet  -- 1 tab(s) by mouth 3 times a day  -- Indication: For Parkinsons disease    docusate sodium 100 mg oral capsule  -- 1 cap(s) by mouth 3 times a day  -- Indication: For constipation    senna oral tablet  -- 2 tab(s) by mouth once a day (at bedtime)  -- Indication: For constipation    benzocaine-menthol 15 mg-3.6 mg mucous membrane lozenge  -- 1 lozenge mucous membrane 4 times a day, As Needed sore throat  -- Indication: For Sore throat    pantoprazole 40 mg oral delayed release tablet  -- 1 tab(s) by mouth once a day (before a meal)  -- Indication: For Acid reflux     levothyroxine 150 mcg (0.15 mg) oral tablet  -- 1 tab(s) by mouth once a day  -- Indication: For Hypothyrodism    Vitamin D3 2000 intl units oral capsule  -- 1 cap(s) by mouth once a day  -- Indication: For vitamin

## 2017-04-27 NOTE — DISCHARGE NOTE ADULT - INSTRUCTIONS
None None    For Constipation :   • Increase your water intake. Drink at least 8 glasses of water daily.  • Try adding fiber to your diet by eating fruits, vegetables and foods that are rich in grains.  • If you do experience constipation, you may take an over-the-counter stool softener/laxative such as Esther Colace, Sen[place] or  Milk of Magnesia. left knee

## 2017-04-27 NOTE — OCCUPATIONAL THERAPY INITIAL EVALUATION ADULT - BATHING, PREVIOUS LEVEL OF FUNCTION, OT EVAL
independent independent/+ sponge bathing (pt reports she has not enter tub secondary to Knee immobilizer & difficulty/weakness left LE for months)

## 2017-04-27 NOTE — OCCUPATIONAL THERAPY INITIAL EVALUATION ADULT - ADDITIONAL COMMENTS
Patient lives in a private house with 1 step to enter house, no steps inside  bedroom/bathroom main floor. Patient has a bathtub with curtain. + RW, SAC, RTS, commode, electric recliner chair, Patient lives in a private house with 1 step to enter house, no steps inside  bedroom/bathroom main floor. Patient has a bathtub with curtain. + RW, SAC, RTS/grab bars , electric recliner chair.

## 2017-04-27 NOTE — OCCUPATIONAL THERAPY INITIAL EVALUATION ADULT - IADL RETRAINING, OT EVAL
Patient will increase standing tolerance to 3-5 minutes for grooming at the sink within 3-5 sessions

## 2017-04-27 NOTE — DISCHARGE NOTE ADULT - PROVIDER TOKENS
FREE:[LAST:[Adriano],FIRST:[Soy],PHONE:[(   )    -],FAX:[(   )    -],ADDRESS:[[address] [place] [address]  Tel: [phone]  Fax: [phone]]]

## 2017-04-27 NOTE — DISCHARGE NOTE ADULT - PATIENT PORTAL LINK FT
“You can access the Follow[employer] Patient Portal, offered by [facility], by registering with the following website: [email or web address]”

## 2017-04-28 LAB
ANION GAP SERPL CALC-SCNC: 9 MMOL/L — SIGNIFICANT CHANGE UP (ref 5–17)
BUN SERPL-MCNC: 27 MG/DL — HIGH (ref 7–23)
CALCIUM SERPL-MCNC: 9 MG/DL — SIGNIFICANT CHANGE UP (ref 8.4–10.5)
CHLORIDE SERPL-SCNC: 107 MMOL/L — SIGNIFICANT CHANGE UP (ref 96–108)
CO2 SERPL-SCNC: 28 MMOL/L — SIGNIFICANT CHANGE UP (ref 22–31)
CREAT SERPL-MCNC: 0.94 MG/DL — SIGNIFICANT CHANGE UP (ref 0.5–1.3)
FOLATE SERPL-MCNC: 6.4 NG/ML — SIGNIFICANT CHANGE UP (ref 4.8–24.2)
GLUCOSE SERPL-MCNC: 107 MG/DL — HIGH (ref 70–99)
HCT VFR BLD CALC: 30.1 % — LOW (ref 34.5–45)
HGB BLD-MCNC: 10.3 G/DL — LOW (ref 11.5–15.5)
MCHC RBC-ENTMCNC: 34.2 GM/DL — SIGNIFICANT CHANGE UP (ref 32–36)
MCHC RBC-ENTMCNC: 34.9 PG — HIGH (ref 27–34)
MCV RBC AUTO: 101.8 FL — HIGH (ref 80–100)
PLATELET # BLD AUTO: 209 K/UL — SIGNIFICANT CHANGE UP (ref 150–400)
POTASSIUM SERPL-MCNC: 4.3 MMOL/L — SIGNIFICANT CHANGE UP (ref 3.5–5.3)
POTASSIUM SERPL-SCNC: 4.3 MMOL/L — SIGNIFICANT CHANGE UP (ref 3.5–5.3)
RBC # BLD: 2.95 M/UL — LOW (ref 3.8–5.2)
RBC # FLD: 14.7 % — HIGH (ref 10.3–14.5)
SODIUM SERPL-SCNC: 144 MMOL/L — SIGNIFICANT CHANGE UP (ref 135–145)
VIT B12 SERPL-MCNC: 308 PG/ML — SIGNIFICANT CHANGE UP (ref 243–894)
WBC # BLD: 9.4 K/UL — SIGNIFICANT CHANGE UP (ref 3.8–10.5)
WBC # FLD AUTO: 9.4 K/UL — SIGNIFICANT CHANGE UP (ref 3.8–10.5)

## 2017-04-28 PROCEDURE — 99233 SBSQ HOSP IP/OBS HIGH 50: CPT

## 2017-04-28 RX ORDER — BENZOCAINE AND MENTHOL 5; 1 G/100ML; G/100ML
1 LIQUID ORAL
Qty: 30 | Refills: 0
Start: 2017-04-28 | End: 2017-05-28

## 2017-04-28 RX ORDER — LEVOTHYROXINE SODIUM 125 MCG
1 TABLET ORAL
Qty: 0 | Refills: 0 | DISCHARGE
Start: 2017-04-28

## 2017-04-28 RX ORDER — OXYCODONE HYDROCHLORIDE 5 MG/1
1 TABLET ORAL
Qty: 60 | Refills: 0 | OUTPATIENT
Start: 2017-04-28 | End: 2017-05-12

## 2017-04-28 RX ORDER — SENNA PLUS 8.6 MG/1
2 TABLET ORAL
Qty: 0 | Refills: 0 | DISCHARGE
Start: 2017-04-28

## 2017-04-28 RX ORDER — ACETAMINOPHEN 500 MG
2 TABLET ORAL
Qty: 0 | Refills: 0 | DISCHARGE
Start: 2017-04-28

## 2017-04-28 RX ORDER — PANTOPRAZOLE SODIUM 20 MG/1
1 TABLET, DELAYED RELEASE ORAL
Qty: 30 | Refills: 1
Start: 2017-04-28 | End: 2017-06-26

## 2017-04-28 RX ORDER — OXYCODONE HYDROCHLORIDE 5 MG/1
1 TABLET ORAL
Qty: 80 | Refills: 0
Start: 2017-04-28

## 2017-04-28 RX ORDER — DOCUSATE SODIUM 100 MG
1 CAPSULE ORAL
Qty: 90 | Refills: 0
Start: 2017-04-28 | End: 2017-05-28

## 2017-04-28 RX ADMIN — Medication 1000 MILLIGRAM(S): at 17:41

## 2017-04-28 RX ADMIN — CARBIDOPA AND LEVODOPA 1 TABLET(S): 25; 100 TABLET ORAL at 22:05

## 2017-04-28 RX ADMIN — OXYCODONE HYDROCHLORIDE 5 MILLIGRAM(S): 5 TABLET ORAL at 09:00

## 2017-04-28 RX ADMIN — Medication 1000 MILLIGRAM(S): at 08:17

## 2017-04-28 RX ADMIN — EZETIMIBE 10 MILLIGRAM(S): 10 TABLET ORAL at 22:07

## 2017-04-28 RX ADMIN — OXYCODONE HYDROCHLORIDE 5 MILLIGRAM(S): 5 TABLET ORAL at 08:17

## 2017-04-28 RX ADMIN — Medication 150 MICROGRAM(S): at 05:34

## 2017-04-28 RX ADMIN — OXYCODONE HYDROCHLORIDE 5 MILLIGRAM(S): 5 TABLET ORAL at 17:38

## 2017-04-28 RX ADMIN — OXYCODONE HYDROCHLORIDE 5 MILLIGRAM(S): 5 TABLET ORAL at 14:00

## 2017-04-28 RX ADMIN — PRAMIPEXOLE DIHYDROCHLORIDE 0.25 MILLIGRAM(S): 0.12 TABLET ORAL at 05:34

## 2017-04-28 RX ADMIN — OXYCODONE HYDROCHLORIDE 10 MILLIGRAM(S): 5 TABLET ORAL at 03:20

## 2017-04-28 RX ADMIN — OXYCODONE HYDROCHLORIDE 10 MILLIGRAM(S): 5 TABLET ORAL at 22:05

## 2017-04-28 RX ADMIN — Medication 100 MILLIGRAM(S): at 13:06

## 2017-04-28 RX ADMIN — CELECOXIB 200 MILLIGRAM(S): 200 CAPSULE ORAL at 08:16

## 2017-04-28 RX ADMIN — APIXABAN 2.5 MILLIGRAM(S): 2.5 TABLET, FILM COATED ORAL at 22:05

## 2017-04-28 RX ADMIN — CELECOXIB 200 MILLIGRAM(S): 200 CAPSULE ORAL at 08:20

## 2017-04-28 RX ADMIN — OXYCODONE HYDROCHLORIDE 10 MILLIGRAM(S): 5 TABLET ORAL at 02:50

## 2017-04-28 RX ADMIN — SENNA PLUS 2 TABLET(S): 8.6 TABLET ORAL at 22:07

## 2017-04-28 RX ADMIN — OXYCODONE HYDROCHLORIDE 5 MILLIGRAM(S): 5 TABLET ORAL at 18:37

## 2017-04-28 RX ADMIN — CARBIDOPA AND LEVODOPA 1 TABLET(S): 25; 100 TABLET ORAL at 05:34

## 2017-04-28 RX ADMIN — PANTOPRAZOLE SODIUM 40 MILLIGRAM(S): 20 TABLET, DELAYED RELEASE ORAL at 05:35

## 2017-04-28 RX ADMIN — APIXABAN 2.5 MILLIGRAM(S): 2.5 TABLET, FILM COATED ORAL at 08:17

## 2017-04-28 RX ADMIN — CARBIDOPA AND LEVODOPA 1 TABLET(S): 25; 100 TABLET ORAL at 13:06

## 2017-04-28 RX ADMIN — Medication 1000 MILLIGRAM(S): at 17:38

## 2017-04-28 RX ADMIN — Medication 2000 UNIT(S): at 13:05

## 2017-04-28 RX ADMIN — PRAMIPEXOLE DIHYDROCHLORIDE 0.25 MILLIGRAM(S): 0.12 TABLET ORAL at 22:06

## 2017-04-28 RX ADMIN — OXYCODONE HYDROCHLORIDE 10 MILLIGRAM(S): 5 TABLET ORAL at 22:35

## 2017-04-28 RX ADMIN — Medication 100 MILLIGRAM(S): at 05:34

## 2017-04-28 RX ADMIN — OXYCODONE HYDROCHLORIDE 5 MILLIGRAM(S): 5 TABLET ORAL at 13:10

## 2017-04-28 RX ADMIN — CELECOXIB 200 MILLIGRAM(S): 200 CAPSULE ORAL at 17:38

## 2017-04-28 RX ADMIN — Medication 1000 MILLIGRAM(S): at 08:20

## 2017-04-28 RX ADMIN — Medication 100 MILLIGRAM(S): at 22:06

## 2017-04-28 RX ADMIN — PRAMIPEXOLE DIHYDROCHLORIDE 0.25 MILLIGRAM(S): 0.12 TABLET ORAL at 13:05

## 2017-04-28 NOTE — PROGRESS NOTE ADULT - SUBJECTIVE AND OBJECTIVE BOX
Patient is a 66y old  Female who presents with a chief complaint of Removal of cement spacer left knee  Reimplantation left total knee replacement (27 Apr 2017 10:10)      INTERVAL HPI/OVERNIGHT EVENTS: no dizziness, lightheadedness, sob, chest pain    Pain Location & Control: left knee controlled    MEDICATIONS  (STANDING):  pantoprazole    Tablet 40milliGRAM(s) Oral before breakfast  senna 2Tablet(s) Oral at bedtime  docusate sodium 100milliGRAM(s) Oral three times a day  apixaban 2.5milliGRAM(s) Oral every 12 hours  celecoxib 200milliGRAM(s) Oral two times a day with meals  pramipexole 0.25milliGRAM(s) Oral three times a day  carbidopa/levodopa  25/100 1Tablet(s) Oral three times a day  acetaminophen   Tablet. 1000milliGRAM(s) Oral every 8 hours  levothyroxine 150MICROGram(s) Oral daily  cholecalciferol 2000Unit(s) Oral daily  ezetimibe 10milliGRAM(s) Oral at bedtime    MEDICATIONS  (PRN):  aluminum hydroxide/magnesium hydroxide/simethicone Suspension 30milliLiter(s) Oral four times a day PRN Indigestion  ondansetron Injectable 4milliGRAM(s) IV Push every 6 hours PRN Nausea and/or Vomiting  magnesium hydroxide Suspension 30milliLiter(s) Oral daily PRN Constipation  polyethylene glycol 3350 17Gram(s) Oral daily PRN Constipation  bisacodyl Suppository 10milliGRAM(s) Rectal daily PRN Constipation  oxyCODONE IR 5milliGRAM(s) Oral every 3 hours PRN Mild Pain  oxyCODONE IR 10milliGRAM(s) Oral every 3 hours PRN Moderate Pain  HYDROmorphone  Injectable 0.5milliGRAM(s) SubCutaneous every 3 hours PRN Severe Pain  benzocaine 15 mG/menthol 3.6 mG Lozenge 1Lozenge Oral every 4 hours PRN Sore Throat      Allergies    No Known Allergies        REVIEW OF SYSTEMS:  CONSTITUTIONAL: No fever, weight loss, or fatigue  EYES: No eye pain, visual disturbances, or discharge  ENMT:  No difficulty hearing, tinnitus, vertigo; No sinus or throat pain  NECK: No pain or stiffness  BREASTS: No pain, masses, or nipple discharge  RESPIRATORY: No cough, wheezing, chills or hemoptysis; No shortness of breath  CARDIOVASCULAR: No chest pain, palpitations, dizziness, or leg swelling  GASTROINTESTINAL: No abdominal or epigastric pain. No nausea, vomiting, or hematemesis; No diarrhea or constipation. No melena or hematochezia.  GENITOURINARY: No dysuria, frequency, hematuria, or incontinence  NEUROLOGICAL: No headaches, memory loss, loss of strength, numbness, or tremors  SKIN: No itching, burning, rashes, or lesions   LYMPH NODES: No enlarged glands  ENDOCRINE: No heat or cold intolerance; No hair loss; No polydipsia or polyuria  MUSCULOSKELETAL: No back pain  PSYCHIATRIC: No depression, anxiety, mood swings, or difficulty sleeping  HEME/LYMPH: No easy bruising, or bleeding gums  ALLERGY AND IMMUNOLOGIC: No hives or eczema    Vital Signs Last 24 Hrs  T(C): 36.6, Max: 36.8 (04-27 @ 15:20)  T(F): 97.8, Max: 98.2 (04-27 @ 15:20)  HR: 70 (70 - 89)  BP: 94/47 (94/47 - 110/61)  BP(mean): --  RR: 18 (16 - 18)  SpO2: 99% (97% - 100%)    PHYSICAL EXAM:  GENERAL: NAD, well-groomed, well-developed  HEAD:  Atraumatic, Normocephalic  EYES: EOMI, PERRLA, conjunctiva and sclera clear  ENMT: No tonsillar erythema, exudates, or enlargement; Moist mucous membranes, Good dentition, No lesions  NECK: Supple, No JVD, Normal thyroid  NERVOUS SYSTEM:  Alert & Oriented X3, Good concentration; Bilateral LE mobile, sensation to light touch intact  CHEST/LUNG: Clear to auscultation bilaterally; No rales, rhonchi, wheezing, or rubs  HEART: Regular rate and rhythm; No murmurs, rubs, or gallops  ABDOMEN: Soft, Nontender, Nondistended; Bowel sounds present  EXTREMITIES:  2+ Peripheral Pulses, No clubbing or cyanosis, no calf tenderness  LYMPH: No lymphadenopathy noted  SKIN: No rashes or lesions  INCISION:  Dressing dry and intact    LABS:                        10.3   9.4   )-----------( 209      ( 28 Apr 2017 07:12 )             30.1     28 Apr 2017 07:12    144    |  107    |  27     ----------------------------<  107    4.3     |  28     |  0.94     Ca    9.0        28 Apr 2017 07:12          CAPILLARY BLOOD GLUCOSE      RADIOLOGY & ADDITIONAL TESTS:    Imaging Personally Reviewed:  [ ] YES      Consultant(s) Notes Reviewed:     Care Discussed with Consultants/Other Providers:

## 2017-04-28 NOTE — PROGRESS NOTE ADULT - PROBLEM SELECTOR PLAN 1
continue pt/ot  continue pain medication  dvt proph -eliquis continue pt/ot  continue pain medication  dvt proph -eliquis  DC plan home with homecare tomorrow.

## 2017-04-28 NOTE — PROGRESS NOTE ADULT - SUBJECTIVE AND OBJECTIVE BOX
POST OPERATIVE DAY #: 2    66y Female  s/p  Left   TKA, removal of cement spacer                       SUBJECTIVE: Patient seen and examined at bedside.     Pain:  well controlled    Denies CP, SOB, N/V/D, weakness, numbness   No new complains     OBJECTIVE:     Vital Signs Last 24 Hrs  T(C): 36.5, Max: 36.8 (04-27 @ 15:20)  T(F): 97.7, Max: 98.2 (04-27 @ 15:20)  HR: 80 (71 - 94)  BP: 110/61 (99/60 - 110/61)  BP(mean): --  RR: 17 (14 - 17)  SpO2: 97% (97% - 100%)    Affected extremity: LLE         Dressing: removed, Incision I/C/D, HV removed         Sensation: intact to light touch          Motor exam:  5 / 5 Tibialis Anterior/Gastrocnemius-Soleus, EHL/FHL         warm, well-perfused; capillary refill < 3 seconds            LABS:                        11.2   10.5  )-----------( 240      ( 27 Apr 2017 07:19 )             34.0     04-27    142  |  105  |  24<H>  ----------------------------<  142<H>  5.2   |  30  |  1.06    Ca    9.4      27 Apr 2017 07:19          MEDICATIONS:      Pain management:  ondansetron Injectable 4milliGRAM(s) IV Push every 6 hours PRN  celecoxib 200milliGRAM(s) Oral two times a day with meals  pramipexole 0.25milliGRAM(s) Oral three times a day  carbidopa/levodopa  25/100 1Tablet(s) Oral three times a day  oxyCODONE IR 5milliGRAM(s) Oral every 3 hours PRN  oxyCODONE IR 10milliGRAM(s) Oral every 3 hours PRN  HYDROmorphone  Injectable 0.5milliGRAM(s) SubCutaneous every 3 hours PRN  acetaminophen   Tablet. 1000milliGRAM(s) Oral every 8 hours    DVT prophylaxis:   apixaban 2.5milliGRAM(s) Oral every 12 hours      RADIOLOGY & ADDITIONAL STUDIES:    ASSESSMENT AND PLAN:     - Analgesic pain control  - DVT prophylaxis: Eliquis2.5mg twice a day      SCDs       -  PT/OT: Weight Bearing Status:  Weight bearing as tolerated in the knee brace in extension at least 2 weeks. will re evaluate in the office, OOBTC,    NO knee flexion     -  Incentive spirometry  - Advance diet as tolerated  - Hospitalist is following  -  Follow up labs  -  Disposition: Home

## 2017-04-29 VITALS
DIASTOLIC BLOOD PRESSURE: 70 MMHG | TEMPERATURE: 98 F | SYSTOLIC BLOOD PRESSURE: 112 MMHG | RESPIRATION RATE: 17 BRPM | HEART RATE: 75 BPM

## 2017-04-29 LAB
ANION GAP SERPL CALC-SCNC: 7 MMOL/L — SIGNIFICANT CHANGE UP (ref 5–17)
BUN SERPL-MCNC: 27 MG/DL — HIGH (ref 7–23)
CALCIUM SERPL-MCNC: 9 MG/DL — SIGNIFICANT CHANGE UP (ref 8.4–10.5)
CHLORIDE SERPL-SCNC: 108 MMOL/L — SIGNIFICANT CHANGE UP (ref 96–108)
CO2 SERPL-SCNC: 29 MMOL/L — SIGNIFICANT CHANGE UP (ref 22–31)
CREAT SERPL-MCNC: 0.97 MG/DL — SIGNIFICANT CHANGE UP (ref 0.5–1.3)
GLUCOSE SERPL-MCNC: 101 MG/DL — HIGH (ref 70–99)
HCT VFR BLD CALC: 31.4 % — LOW (ref 34.5–45)
HGB BLD-MCNC: 9.8 G/DL — LOW (ref 11.5–15.5)
MCHC RBC-ENTMCNC: 31.2 GM/DL — LOW (ref 32–36)
MCHC RBC-ENTMCNC: 33.4 PG — SIGNIFICANT CHANGE UP (ref 27–34)
MCV RBC AUTO: 107.3 FL — HIGH (ref 80–100)
PLATELET # BLD AUTO: 221 K/UL — SIGNIFICANT CHANGE UP (ref 150–400)
POTASSIUM SERPL-MCNC: 4.4 MMOL/L — SIGNIFICANT CHANGE UP (ref 3.5–5.3)
POTASSIUM SERPL-SCNC: 4.4 MMOL/L — SIGNIFICANT CHANGE UP (ref 3.5–5.3)
RBC # BLD: 2.93 M/UL — LOW (ref 3.8–5.2)
RBC # FLD: 15.3 % — HIGH (ref 10.3–14.5)
SODIUM SERPL-SCNC: 144 MMOL/L — SIGNIFICANT CHANGE UP (ref 135–145)
WBC # BLD: 7.4 K/UL — SIGNIFICANT CHANGE UP (ref 3.8–10.5)
WBC # FLD AUTO: 7.4 K/UL — SIGNIFICANT CHANGE UP (ref 3.8–10.5)

## 2017-04-29 PROCEDURE — 86920 COMPATIBILITY TEST SPIN: CPT

## 2017-04-29 PROCEDURE — 82607 VITAMIN B-12: CPT

## 2017-04-29 PROCEDURE — 88300 SURGICAL PATH GROSS: CPT

## 2017-04-29 PROCEDURE — 97535 SELF CARE MNGMENT TRAINING: CPT

## 2017-04-29 PROCEDURE — 99233 SBSQ HOSP IP/OBS HIGH 50: CPT

## 2017-04-29 PROCEDURE — 97116 GAIT TRAINING THERAPY: CPT

## 2017-04-29 PROCEDURE — C1776: CPT

## 2017-04-29 PROCEDURE — C1713: CPT

## 2017-04-29 PROCEDURE — 94664 DEMO&/EVAL PT USE INHALER: CPT

## 2017-04-29 PROCEDURE — 82746 ASSAY OF FOLIC ACID SERUM: CPT

## 2017-04-29 PROCEDURE — 97530 THERAPEUTIC ACTIVITIES: CPT

## 2017-04-29 PROCEDURE — 88305 TISSUE EXAM BY PATHOLOGIST: CPT

## 2017-04-29 PROCEDURE — 97165 OT EVAL LOW COMPLEX 30 MIN: CPT

## 2017-04-29 PROCEDURE — 88311 DECALCIFY TISSUE: CPT

## 2017-04-29 PROCEDURE — 80048 BASIC METABOLIC PNL TOTAL CA: CPT

## 2017-04-29 PROCEDURE — 73562 X-RAY EXAM OF KNEE 3: CPT

## 2017-04-29 PROCEDURE — 85027 COMPLETE CBC AUTOMATED: CPT

## 2017-04-29 RX ADMIN — OXYCODONE HYDROCHLORIDE 10 MILLIGRAM(S): 5 TABLET ORAL at 01:25

## 2017-04-29 RX ADMIN — PANTOPRAZOLE SODIUM 40 MILLIGRAM(S): 20 TABLET, DELAYED RELEASE ORAL at 06:16

## 2017-04-29 RX ADMIN — Medication 100 MILLIGRAM(S): at 06:15

## 2017-04-29 RX ADMIN — PRAMIPEXOLE DIHYDROCHLORIDE 0.25 MILLIGRAM(S): 0.12 TABLET ORAL at 13:26

## 2017-04-29 RX ADMIN — Medication 100 MILLIGRAM(S): at 13:26

## 2017-04-29 RX ADMIN — Medication 1000 MILLIGRAM(S): at 09:22

## 2017-04-29 RX ADMIN — Medication 150 MICROGRAM(S): at 06:17

## 2017-04-29 RX ADMIN — APIXABAN 2.5 MILLIGRAM(S): 2.5 TABLET, FILM COATED ORAL at 09:22

## 2017-04-29 RX ADMIN — Medication 1000 MILLIGRAM(S): at 04:48

## 2017-04-29 RX ADMIN — CELECOXIB 200 MILLIGRAM(S): 200 CAPSULE ORAL at 09:22

## 2017-04-29 RX ADMIN — CARBIDOPA AND LEVODOPA 1 TABLET(S): 25; 100 TABLET ORAL at 06:16

## 2017-04-29 RX ADMIN — OXYCODONE HYDROCHLORIDE 10 MILLIGRAM(S): 5 TABLET ORAL at 01:55

## 2017-04-29 RX ADMIN — PRAMIPEXOLE DIHYDROCHLORIDE 0.25 MILLIGRAM(S): 0.12 TABLET ORAL at 06:16

## 2017-04-29 RX ADMIN — Medication 2000 UNIT(S): at 13:26

## 2017-04-29 RX ADMIN — CARBIDOPA AND LEVODOPA 1 TABLET(S): 25; 100 TABLET ORAL at 13:26

## 2017-04-29 RX ADMIN — Medication 1000 MILLIGRAM(S): at 01:25

## 2017-04-29 NOTE — PROGRESS NOTE ADULT - SUBJECTIVE AND OBJECTIVE BOX
POST OPERATIVE DAY #:  3  STATUS POST: Left TKR removal of cement spacer                       SUBJECTIVE: Patient seen and examined. Resting comfortably in bed. No complaints.  Pain Score = 0    OBJECTIVE:     Vital Signs Last 24 Hrs  T(C): 36.3, Max: 36.8 (04-28 @ 15:28)  T(F): 97.4, Max: 98.2 (04-28 @ 15:28)  HR: 69 (69 - 78)  BP: 98/62 (94/47 - 120/72)  RR: 17 (16 - 18)  SpO2: 97% (97% - 99%)    Left Knee: Knee immobilizer in place         Incision clean/dry/intact    Bilateral LEs:         Sensation:  intact to light touch :          Motor exam:  5/5 dorsiflexion/plantarflexion/EHL          2+ DP pulses          calf supple, NT    LABS:                        9.8    7.4   )-----------( 221      ( 29 Apr 2017 07:13 )             31.4     04-29    144  |  108  |  27<H>  ----------------------------<  101<H>  4.4   |  29  |  0.97    Ca    9.0      29 Apr 2017 07:14            MEDICATIONS:  Anticoagulation:  apixaban 2.5milliGRAM(s) Oral every 12 hours      Pain medications:   celecoxib 200milliGRAM(s) Oral two times a day with meals  oxyCODONE IR 5milliGRAM(s) Oral every 3 hours PRN  oxyCODONE IR 10milliGRAM(s) Oral every 3 hours PRN  HYDROmorphone  Injectable 0.5milliGRAM(s) SubCutaneous every 3 hours PRN  acetaminophen   Tablet. 1000milliGRAM(s) Oral every 8 hours        A/P :   s/p Left TKR removal of cement spacer  POD # 3  -    Pain control: oxycodone  -    DVT ppx: Eliquis  -    Weight bearing status: WBAT   -    Physical Therapy  -    Occupational Therapy  -    Discharge plan:   home today with home care POST OPERATIVE DAY #:  3  STATUS POST: Left TKR removal of cement spacer, reimplantation of TKR components                       SUBJECTIVE: Patient seen and examined. Resting comfortably in bed. No complaints.  Pain Score = 0    OBJECTIVE:     Vital Signs Last 24 Hrs  T(C): 36.3, Max: 36.8 (04-28 @ 15:28)  T(F): 97.4, Max: 98.2 (04-28 @ 15:28)  HR: 69 (69 - 78)  BP: 98/62 (94/47 - 120/72)  RR: 17 (16 - 18)  SpO2: 97% (97% - 99%)    Left Knee: Knee immobilizer in place         Incision clean/dry/intact    Bilateral LEs:         Sensation:  intact to light touch :          Motor exam:  5/5 dorsiflexion/plantarflexion/EHL          2+ DP pulses          calf supple, NT    LABS:                        9.8    7.4   )-----------( 221      ( 29 Apr 2017 07:13 )             31.4     04-29    144  |  108  |  27<H>  ----------------------------<  101<H>  4.4   |  29  |  0.97    Ca    9.0      29 Apr 2017 07:14            MEDICATIONS:  Anticoagulation:  apixaban 2.5milliGRAM(s) Oral every 12 hours      Pain medications:   celecoxib 200milliGRAM(s) Oral two times a day with meals  oxyCODONE IR 5milliGRAM(s) Oral every 3 hours PRN  oxyCODONE IR 10milliGRAM(s) Oral every 3 hours PRN  HYDROmorphone  Injectable 0.5milliGRAM(s) SubCutaneous every 3 hours PRN  acetaminophen   Tablet. 1000milliGRAM(s) Oral every 8 hours        A/P :   s/p Left TKR removal of cement spacer, reimplantation of TKR components  POD # 3  -    Pain control: oxycodone  -    DVT ppx: Eliquis  -    Weight bearing status: WBAT   -    Physical Therapy  -    Occupational Therapy  -    Discharge plan:   home today with home care

## 2017-04-29 NOTE — PROGRESS NOTE ADULT - PROBLEM SELECTOR PLAN 5
b12 and folate levels are low normal.  suggest outpatient follow up.
check b12/folate levels.
check b12/folate levels.

## 2017-04-29 NOTE — PROGRESS NOTE ADULT - SUBJECTIVE AND OBJECTIVE BOX
Patient is a 66y old  Female who presents with a chief complaint of Removal of cement spacer left knee  Reimplantation left total knee replacement (27 Apr 2017 10:10)      INTERVAL HPI/OVERNIGHT EVENTS:  pain is controoled with meds.  sitting on chair.  Low BP today, pt is asymptomatics, advise to encourage po fluids.    Pain Location & Control:     MEDICATIONS  (STANDING):  pantoprazole    Tablet 40milliGRAM(s) Oral before breakfast  senna 2Tablet(s) Oral at bedtime  docusate sodium 100milliGRAM(s) Oral three times a day  apixaban 2.5milliGRAM(s) Oral every 12 hours  celecoxib 200milliGRAM(s) Oral two times a day with meals  pramipexole 0.25milliGRAM(s) Oral three times a day  carbidopa/levodopa  25/100 1Tablet(s) Oral three times a day  acetaminophen   Tablet. 1000milliGRAM(s) Oral every 8 hours  levothyroxine 150MICROGram(s) Oral daily  cholecalciferol 2000Unit(s) Oral daily  ezetimibe 10milliGRAM(s) Oral at bedtime    MEDICATIONS  (PRN):  aluminum hydroxide/magnesium hydroxide/simethicone Suspension 30milliLiter(s) Oral four times a day PRN Indigestion  ondansetron Injectable 4milliGRAM(s) IV Push every 6 hours PRN Nausea and/or Vomiting  magnesium hydroxide Suspension 30milliLiter(s) Oral daily PRN Constipation  polyethylene glycol 3350 17Gram(s) Oral daily PRN Constipation  bisacodyl Suppository 10milliGRAM(s) Rectal daily PRN Constipation  oxyCODONE IR 5milliGRAM(s) Oral every 3 hours PRN Mild Pain  oxyCODONE IR 10milliGRAM(s) Oral every 3 hours PRN Moderate Pain  HYDROmorphone  Injectable 0.5milliGRAM(s) SubCutaneous every 3 hours PRN Severe Pain  benzocaine 15 mG/menthol 3.6 mG Lozenge 1Lozenge Oral every 4 hours PRN Sore Throat      Allergies    No Known Allergies    Intolerances            Vital Signs Last 24 Hrs  T(C): 36.3, Max: 36.8 (04-28 @ 15:28)  T(F): 97.4, Max: 98.2 (04-28 @ 15:28)  HR: 69 (69 - 78)  BP: 98/62 (94/47 - 120/72)  BP(mean): --  RR: 17 (16 - 18)  SpO2: 97% (97% - 99%)        LABS:                        9.8    7.4   )-----------( 221      ( 29 Apr 2017 07:13 )             31.4     29 Apr 2017 07:14    144    |  108    |  27     ----------------------------<  101    4.4     |  29     |  0.97     Ca    9.0        29 Apr 2017 07:14        RADIOLOGY & ADDITIONAL TESTS:    Imaging Personally Reviewed:  [ ] YES  [ ] NO    Consultant(s) Notes Reviewed:  [x ] YES  [ ] NO    Care Discussed with Consultants/Other Providers [ x] YES  [ ] NO

## 2017-04-30 ENCOUNTER — TRANSCRIPTION ENCOUNTER (OUTPATIENT)
Age: 67
End: 2017-04-30

## 2017-05-09 ENCOUNTER — APPOINTMENT (OUTPATIENT)
Dept: ORTHOPEDIC SURGERY | Facility: CLINIC | Age: 67
End: 2017-05-09

## 2017-05-16 ENCOUNTER — CHART COPY (OUTPATIENT)
Age: 67
End: 2017-05-16

## 2017-06-06 ENCOUNTER — APPOINTMENT (OUTPATIENT)
Dept: ORTHOPEDIC SURGERY | Facility: CLINIC | Age: 67
End: 2017-06-06

## 2017-06-20 ENCOUNTER — CHART COPY (OUTPATIENT)
Age: 67
End: 2017-06-20

## 2017-07-08 ENCOUNTER — APPOINTMENT (OUTPATIENT)
Dept: ORTHOPEDIC SURGERY | Facility: CLINIC | Age: 67
End: 2017-07-08

## 2017-07-08 VITALS
BODY MASS INDEX: 43.94 KG/M2 | SYSTOLIC BLOOD PRESSURE: 128 MMHG | HEIGHT: 63 IN | DIASTOLIC BLOOD PRESSURE: 80 MMHG | WEIGHT: 248 LBS | HEART RATE: 87 BPM

## 2017-07-18 ENCOUNTER — APPOINTMENT (OUTPATIENT)
Dept: ORTHOPEDIC SURGERY | Facility: CLINIC | Age: 67
End: 2017-07-18

## 2017-08-01 ENCOUNTER — APPOINTMENT (OUTPATIENT)
Dept: ORTHOPEDIC SURGERY | Facility: CLINIC | Age: 67
End: 2017-08-01
Payer: MEDICARE

## 2017-08-01 VITALS
HEIGHT: 63 IN | SYSTOLIC BLOOD PRESSURE: 137 MMHG | WEIGHT: 248 LBS | HEART RATE: 101 BPM | DIASTOLIC BLOOD PRESSURE: 72 MMHG | BODY MASS INDEX: 43.94 KG/M2

## 2017-08-01 PROCEDURE — 99024 POSTOP FOLLOW-UP VISIT: CPT

## 2017-08-01 PROCEDURE — 73562 X-RAY EXAM OF KNEE 3: CPT | Mod: LT

## 2017-08-08 ENCOUNTER — CHART COPY (OUTPATIENT)
Age: 67
End: 2017-08-08

## 2017-08-09 ENCOUNTER — CHART COPY (OUTPATIENT)
Age: 67
End: 2017-08-09

## 2017-08-15 ENCOUNTER — APPOINTMENT (OUTPATIENT)
Dept: ORTHOPEDIC SURGERY | Facility: CLINIC | Age: 67
End: 2017-08-15
Payer: MEDICARE

## 2017-08-15 PROCEDURE — 99213 OFFICE O/P EST LOW 20 MIN: CPT

## 2017-08-15 RX ORDER — AMOXICILLIN AND CLAVULANATE POTASSIUM 875; 125 MG/1; MG/1
875-125 TABLET, COATED ORAL
Qty: 14 | Refills: 0 | Status: DISCONTINUED | COMMUNITY
Start: 2017-07-19

## 2017-08-15 RX ORDER — DOCUSATE SODIUM 100 MG/1
100 CAPSULE, LIQUID FILLED ORAL
Qty: 90 | Refills: 0 | Status: DISCONTINUED | COMMUNITY
Start: 2017-04-28

## 2017-09-05 ENCOUNTER — APPOINTMENT (OUTPATIENT)
Dept: ORTHOPEDIC SURGERY | Facility: CLINIC | Age: 67
End: 2017-09-05
Payer: MEDICARE

## 2017-09-05 VITALS
BODY MASS INDEX: 44.3 KG/M2 | DIASTOLIC BLOOD PRESSURE: 74 MMHG | HEIGHT: 63 IN | SYSTOLIC BLOOD PRESSURE: 117 MMHG | HEART RATE: 88 BPM | WEIGHT: 250 LBS

## 2017-09-05 PROCEDURE — 73562 X-RAY EXAM OF KNEE 3: CPT | Mod: LT

## 2017-09-05 PROCEDURE — 99212 OFFICE O/P EST SF 10 MIN: CPT

## 2017-09-05 RX ORDER — OXYCODONE 5 MG/1
5 TABLET ORAL
Qty: 60 | Refills: 0 | Status: DISCONTINUED | COMMUNITY
Start: 2017-02-10 | End: 2017-09-05

## 2017-09-05 RX ORDER — NITROFURANTOIN (MONOHYDRATE/MACROCRYSTALS) 25; 75 MG/1; MG/1
100 CAPSULE ORAL
Qty: 14 | Refills: 0 | Status: DISCONTINUED | COMMUNITY
Start: 2017-03-28 | End: 2017-09-05

## 2017-09-05 RX ORDER — OXYCODONE 5 MG/1
5 TABLET ORAL
Qty: 100 | Refills: 0 | Status: DISCONTINUED | COMMUNITY
Start: 2017-01-12 | End: 2017-09-05

## 2017-09-05 RX ORDER — IBUPROFEN 800 MG/1
800 TABLET, FILM COATED ORAL
Qty: 90 | Refills: 1 | Status: DISCONTINUED | COMMUNITY
Start: 2017-04-04 | End: 2017-09-05

## 2017-09-05 RX ORDER — ASPIRIN 325 MG/1
325 TABLET ORAL
Qty: 56 | Refills: 0 | Status: DISCONTINUED | COMMUNITY
Start: 2017-02-22 | End: 2017-09-05

## 2017-09-05 RX ORDER — APIXABAN 2.5 MG/1
2.5 TABLET, FILM COATED ORAL
Qty: 12 | Refills: 0 | Status: DISCONTINUED | COMMUNITY
Start: 2017-02-22 | End: 2017-09-05

## 2017-09-05 RX ORDER — LEVOCETIRIZINE DIHYDROCHLORIDE 5 MG/1
5 TABLET ORAL
Qty: 30 | Refills: 0 | Status: DISCONTINUED | COMMUNITY
Start: 2016-12-27 | End: 2017-09-05

## 2017-09-05 RX ORDER — PANTOPRAZOLE 40 MG/1
40 TABLET, DELAYED RELEASE ORAL
Qty: 35 | Refills: 0 | Status: DISCONTINUED | COMMUNITY
Start: 2017-02-22 | End: 2017-09-05

## 2017-09-05 RX ORDER — PANTOPRAZOLE 40 MG/1
40 TABLET, DELAYED RELEASE ORAL DAILY
Qty: 30 | Refills: 1 | Status: DISCONTINUED | COMMUNITY
Start: 2017-07-08 | End: 2017-09-05

## 2017-09-05 RX ORDER — NAFCILLIN 2 G/100ML
2 INJECTION, SOLUTION INTRAVENOUS
Qty: 4361 | Refills: 0 | Status: DISCONTINUED | COMMUNITY
Start: 2017-03-21 | End: 2017-09-05

## 2017-09-05 RX ORDER — OXYCODONE 5 MG/1
5 TABLET ORAL
Qty: 60 | Refills: 0 | Status: DISCONTINUED | COMMUNITY
Start: 2017-01-13 | End: 2017-09-05

## 2017-09-05 RX ORDER — LEVOFLOXACIN 500 MG/1
500 TABLET, FILM COATED ORAL
Qty: 5 | Refills: 0 | Status: DISCONTINUED | COMMUNITY
Start: 2017-04-21 | End: 2017-09-05

## 2017-09-05 RX ORDER — NAPROXEN 500 MG/1
500 TABLET ORAL
Qty: 30 | Refills: 0 | Status: DISCONTINUED | COMMUNITY
Start: 2017-06-20 | End: 2017-09-05

## 2017-09-05 RX ORDER — OXYCODONE 5 MG/1
5 TABLET ORAL
Qty: 40 | Refills: 0 | Status: DISCONTINUED | COMMUNITY
Start: 2017-06-20 | End: 2017-09-05

## 2017-09-05 RX ORDER — NAPROXEN 500 MG/1
500 TABLET ORAL
Qty: 60 | Refills: 2 | Status: DISCONTINUED | COMMUNITY
Start: 2017-07-08 | End: 2017-09-05

## 2017-09-12 ENCOUNTER — APPOINTMENT (OUTPATIENT)
Dept: ORTHOPEDIC SURGERY | Facility: CLINIC | Age: 67
End: 2017-09-12

## 2017-10-03 ENCOUNTER — APPOINTMENT (OUTPATIENT)
Dept: ORTHOPEDIC SURGERY | Facility: CLINIC | Age: 67
End: 2017-10-03
Payer: MEDICARE

## 2017-10-03 VITALS
WEIGHT: 251 LBS | BODY MASS INDEX: 44.47 KG/M2 | HEART RATE: 71 BPM | HEIGHT: 63 IN | DIASTOLIC BLOOD PRESSURE: 65 MMHG | SYSTOLIC BLOOD PRESSURE: 106 MMHG

## 2017-10-03 PROCEDURE — 73562 X-RAY EXAM OF KNEE 3: CPT | Mod: LT

## 2017-10-03 PROCEDURE — 99213 OFFICE O/P EST LOW 20 MIN: CPT

## 2017-10-09 ENCOUNTER — TRANSCRIPTION ENCOUNTER (OUTPATIENT)
Age: 67
End: 2017-10-09

## 2017-11-14 ENCOUNTER — APPOINTMENT (OUTPATIENT)
Dept: ORTHOPEDIC SURGERY | Facility: CLINIC | Age: 67
End: 2017-11-14
Payer: MEDICARE

## 2017-11-14 VITALS
WEIGHT: 251 LBS | HEIGHT: 63 IN | SYSTOLIC BLOOD PRESSURE: 117 MMHG | BODY MASS INDEX: 44.47 KG/M2 | DIASTOLIC BLOOD PRESSURE: 76 MMHG | HEART RATE: 78 BPM

## 2017-11-14 PROCEDURE — 99213 OFFICE O/P EST LOW 20 MIN: CPT

## 2017-12-18 ENCOUNTER — RX RENEWAL (OUTPATIENT)
Age: 67
End: 2017-12-18

## 2018-01-08 ENCOUNTER — RX RENEWAL (OUTPATIENT)
Age: 68
End: 2018-01-08

## 2018-01-09 ENCOUNTER — APPOINTMENT (OUTPATIENT)
Dept: ORTHOPEDIC SURGERY | Facility: CLINIC | Age: 68
End: 2018-01-09
Payer: MEDICARE

## 2018-01-09 PROCEDURE — 99213 OFFICE O/P EST LOW 20 MIN: CPT

## 2018-01-09 PROCEDURE — 73562 X-RAY EXAM OF KNEE 3: CPT | Mod: LT

## 2018-01-10 ENCOUNTER — CHART COPY (OUTPATIENT)
Age: 68
End: 2018-01-10

## 2018-03-13 ENCOUNTER — APPOINTMENT (OUTPATIENT)
Dept: ORTHOPEDIC SURGERY | Facility: CLINIC | Age: 68
End: 2018-03-13
Payer: MEDICARE

## 2018-03-13 VITALS
WEIGHT: 251 LBS | HEART RATE: 83 BPM | DIASTOLIC BLOOD PRESSURE: 76 MMHG | BODY MASS INDEX: 44.47 KG/M2 | HEIGHT: 63 IN | SYSTOLIC BLOOD PRESSURE: 123 MMHG

## 2018-03-13 PROCEDURE — 73562 X-RAY EXAM OF KNEE 3: CPT | Mod: LT

## 2018-03-13 PROCEDURE — 99214 OFFICE O/P EST MOD 30 MIN: CPT

## 2018-04-10 ENCOUNTER — RX RENEWAL (OUTPATIENT)
Age: 68
End: 2018-04-10

## 2018-05-29 ENCOUNTER — APPOINTMENT (OUTPATIENT)
Age: 68
End: 2018-05-29
Payer: MEDICARE

## 2018-05-29 VITALS
HEIGHT: 63 IN | HEART RATE: 75 BPM | BODY MASS INDEX: 44.47 KG/M2 | SYSTOLIC BLOOD PRESSURE: 143 MMHG | WEIGHT: 251 LBS | DIASTOLIC BLOOD PRESSURE: 75 MMHG

## 2018-05-29 PROCEDURE — 73562 X-RAY EXAM OF KNEE 3: CPT | Mod: LT

## 2018-05-29 PROCEDURE — 99213 OFFICE O/P EST LOW 20 MIN: CPT

## 2018-07-19 ENCOUNTER — RX RENEWAL (OUTPATIENT)
Age: 68
End: 2018-07-19

## 2019-01-02 ENCOUNTER — CHART COPY (OUTPATIENT)
Age: 69
End: 2019-01-02

## 2019-01-13 ENCOUNTER — TRANSCRIPTION ENCOUNTER (OUTPATIENT)
Age: 69
End: 2019-01-13

## 2019-02-01 ENCOUNTER — APPOINTMENT (OUTPATIENT)
Dept: ORTHOPEDIC SURGERY | Facility: CLINIC | Age: 69
End: 2019-02-01
Payer: MEDICARE

## 2019-02-01 VITALS
HEART RATE: 88 BPM | DIASTOLIC BLOOD PRESSURE: 81 MMHG | WEIGHT: 238 LBS | HEIGHT: 63 IN | TEMPERATURE: 97.7 F | SYSTOLIC BLOOD PRESSURE: 136 MMHG | BODY MASS INDEX: 42.17 KG/M2

## 2019-02-01 PROCEDURE — 73562 X-RAY EXAM OF KNEE 3: CPT | Mod: 50

## 2019-02-01 PROCEDURE — 99213 OFFICE O/P EST LOW 20 MIN: CPT

## 2019-02-01 NOTE — REASON FOR VISIT
[Follow-Up Visit] : a follow-up visit for [Spouse] : spouse [FreeTextEntry2] : S/P revision L total knee replacement 4/26/2017.

## 2019-02-01 NOTE — HISTORY OF PRESENT ILLNESS
[de-identified] : The patient is a 68-year-old female who presents today for followup of both of her knees. She status post bilateral staged total knee replacements in the past with a revision left total knee replacement April of 2017 due to an infectious process, she had explantation followed by antibiotics and a reimplantation. 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 The patient for the most part is ambulating well. She does have a history of Parkinson's disease. She uses a cane for ambulation. She does complain of  intermittent discomfort in the left knee particularly with descending and descending stairs and getting up from a seated position. 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 [Stable] : stable [6] : a current pain level of 6/10 [3] : an average pain level of 3/10 [1] : a minimum pain level of 1/10 [7] : a maximum pain level of 7/10 [Intermit.] : ~He/She~ states the symptoms seem to be intermittent [Bending] : worsened by bending [Direct Pressure] : worsened by direct pressure [Sitting] : worsened by sitting [Acetaminophen] : relieved by acetaminophen [NSAIDs] : relieved by nonsteroidal anti-inflammatory drugs [Opioid Analgesics] : relieved by opioid analgesics [Physical Therapy] : relieved by physical therapy [Rest] : relieved by rest [Ataxia] : no ataxia [Incontinence] : no incontinence [Loss of Dexterity] : good dexterity [Urinary Ret.] : no urinary retention

## 2019-02-01 NOTE — PHYSICAL EXAM
[UE/LE] : Sensory: Intact in bilateral upper & lower extremities [ALL] : dorsalis pedis, posterior tibial, femoral, popliteal, and radial 2+ and symmetric bilaterally [Normal RUE] : Right Upper Extremity: No scars, rashes, lesions, ulcers, skin intact [Normal LUE] : Left Upper Extremity: No scars, rashes, lesions, ulcers, skin intact [Normal RLE] : Right Lower Extremity: No scars, rashes, lesions, ulcers, skin intact [Normal LLE] : Left Lower Extremity: No scars, rashes, lesions, ulcers, skin intact [de-identified] : Well-healed surgical scar to the right knee with erythema, drainage, or evidence of cellulitis. No extension lag. No flexion contractures. Less than 5° laxity with varus valgus stresses. Negative anterior drawer. Range of motion 0-125°. Calves soft, nontender, no evidence of DVT at this time. Patient has good motor and sensory to both of her legs.\par \par Well-healed surgical scars to the left knee without erythema, drainage, redness cellulitis. Range of motion 0-95°. Less than 5° laxity with varus valgus stresses. No extension lag. No flexion contractures. Negative anterior drawer. No effusion. [de-identified] : intact [de-identified] : Right total knee replacement, gross anatomical alignment, good bone to prosthesis interface, there is no gross evidence of prosthetic failure, all components anatomically aligned\par \par Revision left total knee replacement, gross anatomical alignment, good bone to prosthesis interface, there is no gross evidence of prosthetic failure, all components anatomically line. There appears to be fragmented patella, opened the patella system with displacement proximally on the lateral view.

## 2019-02-01 NOTE — DISCUSSION/SUMMARY
[Medication Risks Reviewed] : Medication risks reviewed [Surgical risks reviewed] : Surgical risks reviewed [de-identified] : The patient will continue an exercise program of walking, strength training. She does have Parkinson's disease for which this impedes him for ambulation. The patient recently had bladder cancer removal of the bladder in October of 2018. The patient was given a proximal prescription for pain medication at her request due to some discomfort she will continue with anti-inflammatories. Although the left knee does have a failure of the patella component her knee is very stable, she has no extension lag, or flexion contractures. The patient would benefit from a course of physical therapy and modalities. She will followup in several weeks

## 2019-04-05 ENCOUNTER — CHART COPY (OUTPATIENT)
Age: 69
End: 2019-04-05

## 2019-04-11 ENCOUNTER — TRANSCRIPTION ENCOUNTER (OUTPATIENT)
Age: 69
End: 2019-04-11

## 2019-05-22 ENCOUNTER — CHART COPY (OUTPATIENT)
Age: 69
End: 2019-05-22

## 2019-07-17 ENCOUNTER — APPOINTMENT (OUTPATIENT)
Dept: ORTHOPEDIC SURGERY | Facility: CLINIC | Age: 69
End: 2019-07-17
Payer: MEDICARE

## 2019-07-17 VITALS
BODY MASS INDEX: 43.59 KG/M2 | SYSTOLIC BLOOD PRESSURE: 133 MMHG | DIASTOLIC BLOOD PRESSURE: 80 MMHG | HEIGHT: 63 IN | HEART RATE: 92 BPM | WEIGHT: 246 LBS

## 2019-07-17 PROCEDURE — 72110 X-RAY EXAM L-2 SPINE 4/>VWS: CPT

## 2019-07-17 PROCEDURE — 99214 OFFICE O/P EST MOD 30 MIN: CPT

## 2019-07-17 PROCEDURE — 72170 X-RAY EXAM OF PELVIS: CPT | Mod: 59

## 2019-07-17 RX ORDER — OXYCODONE 5 MG/1
5 TABLET ORAL
Qty: 42 | Refills: 0 | Status: DISCONTINUED | COMMUNITY
Start: 2018-01-09 | End: 2019-07-17

## 2019-07-17 RX ORDER — OXYCODONE 5 MG/1
5 TABLET ORAL
Qty: 40 | Refills: 0 | Status: DISCONTINUED | COMMUNITY
Start: 2018-01-10 | End: 2019-07-17

## 2019-07-17 RX ORDER — OXYCODONE 5 MG/1
5 TABLET ORAL
Qty: 60 | Refills: 0 | Status: DISCONTINUED | COMMUNITY
Start: 2019-02-01 | End: 2019-07-17

## 2019-07-17 RX ORDER — NAPROXEN 500 MG/1
500 TABLET ORAL
Qty: 90 | Refills: 0 | Status: DISCONTINUED | COMMUNITY
Start: 2017-11-14 | End: 2019-07-17

## 2019-07-17 RX ORDER — NAPROXEN 500 MG/1
500 TABLET ORAL
Qty: 180 | Refills: 0 | Status: DISCONTINUED | COMMUNITY
Start: 2017-08-09 | End: 2019-07-17

## 2019-07-17 RX ORDER — OXYCODONE HYDROCHLORIDE 5 MG/1
5 CAPSULE ORAL
Qty: 60 | Refills: 0 | Status: DISCONTINUED | COMMUNITY
Start: 2017-11-14 | End: 2019-07-17

## 2019-07-17 NOTE — HISTORY OF PRESENT ILLNESS
[Worsening] : worsening [Walking] : walking [Standing] : standing [Rest] : relieved by rest [___ yrs] : [unfilled] year(s) ago [de-identified] : Patient is here today for re evaluation on her low back into right hip area pain. Last evaluation on her back 2016. \par Cystectomy 10/2018 for bladder polyps. [de-identified] : limited walking and standing [de-identified] : cane

## 2019-07-17 NOTE — PHYSICAL EXAM
[Obese] : obese [Stooped] : stooped [Antalgic] : antalgic [Limited] : is limited [Painful] : is painful [LE] : 5/5 motor strength in bilateral lower extremities [] : Sensory: [Knee] : patellar 2+ and symmetric bilaterally [Ankle] : ankle 2+ and symmetric bilaterally [DP] : dorsalis pedis 2+ and symmetric bilaterally [Poor Appearance] : well-appearing [Acute Distress] : not in acute distress [Abl Mood] : in a normal mood [Abl Affect] : with normal affect [Poor Coordination] : normal coordination [Disorientation] : oriented x 3 [SLR] : negative straight leg raise [FreeTextEntry2] : The pt is awake, alert and oriented to self, place and time, is comfortable and in no acute distress. Gait examination reveals a narrow based, non-ataxic gait. Can heel and toe walk without difficulty. Inspection of neck, back and lower extremities bilaterally reveals no rashes or ecchymotic lesions.  There is no obvious abnormal spinal curvature in the sagittal and coronal planes. There is no tenderness over the cervical, thoracic or lumbar spine, or the paraspinal or upper and lower extremities musculature. There is no sacroiliac tenderness. No greater trochanteric tenderness bilaterally. No atrophy or abnormal movements noted in the upper or lower extremities. There is no swelling noted in the upper or lower extremities bilaterally. No cervical lymphadenopathy noted anteriorly. No joint laxity noted in the upper and lower extremity joints bilaterally.\par Negative straight leg raise to 45° in the sitting position bilaterally. Some limitation of left hip internal rotation to 10° which is compared to 25° on the right. [de-identified] : extension pain 10°. Flexion is to her mid tibia with no significant back pain. [de-identified] : seen with her \par parkinsons disorder. leans to the right and forward When standing or walking.\par bilateral leg pitting edema to the knees, 2+. Stigmata of chronic venous stasis disease bilateral lower extremities To her mid tibia. [de-identified] : 4 views lumbar spine obtained today demonstrate left-sided lumbar scoliosis from L1-L5 measuring 17°. Grade 2-3 spondylolisthesis at L4-5 with minimal improvement to grade 2 in extension. Significant disc degeneration seen at L5-S1 which is unchanged between flexion and extension. Diffuse osteopenia noted. No acute fractures identified.No significant interval change when compared with x-rays from 2016\par \par AP pelvis Xray shows vascular clips are noted in the pelvis from prior cystectomy. Mild degenerative changes of the hips bilaterally. No obvious acute fractures.

## 2019-07-17 NOTE — DISCUSSION/SUMMARY
[Medication Risks Reviewed] : Medication risks reviewed [de-identified] : I spoke at length with the patient about her current presentation. She has significant disc degeneration at L5-S1 as well as grade 2-3 spondylolisthesis at L4-5. She denies any radicular symptoms and no specific neurologic deficits are noted on examination of her lower extremities today. Discussed various treatment options for her including weight loss physical therapy as well as medications consideration of lumbar epidural steroid injections. Alternative treatments like acupuncture also discussed. Also reviewed with her the most definitive treatment for her would be lumbar spinal fusion and decompression surgery from L4-S1. Patient does not want any spinal surgery at this time specially given her recent cystectomy last year. She will consider weight loss, medications, physical therapy as well as epidural steroid injections. We'll proceed with her treatment As Indicated. We'l check MRI lumbar spine first. \par \par A prescription of baclofen provided for her today. We will schedule a lumbar ELIJAH at her earliest Convenience.\par \par The patient was educated regarding their condition, treatment options as well as prescribed course of treatment. \par Risks and benefits as well as alternatives to the proposed treatment were also provided to the patient \par They were given the opportunity to have all their questions answered to their satisfaction.\par \par Vital signs were reviewed with the patient and the patient was instructed to followup with their primary care provider for further management.\par \par Healthy lifestyle recommendations were also made including a tobacco free lifestyle, proper diet, and weight control.

## 2019-07-29 ENCOUNTER — APPOINTMENT (OUTPATIENT)
Dept: ORTHOPEDIC SURGERY | Facility: CLINIC | Age: 69
End: 2019-07-29
Payer: MEDICARE

## 2019-07-29 VITALS — DIASTOLIC BLOOD PRESSURE: 78 MMHG | HEART RATE: 80 BPM | SYSTOLIC BLOOD PRESSURE: 118 MMHG

## 2019-07-29 DIAGNOSIS — M43.10 SPONDYLOLISTHESIS, SITE UNSPECIFIED: ICD-10-CM

## 2019-07-29 PROCEDURE — 99214 OFFICE O/P EST MOD 30 MIN: CPT

## 2019-07-29 NOTE — DISCUSSION/SUMMARY
[Medication Risks Reviewed] : Medication risks reviewed [de-identified] : The patient presents with back and leg pain symptoms consistent with degenerative spinal stasis and scoliosis as well as spinal stenosis primarily at L4-5. We discussed definitive treatment options for this including spinal fusion surgery but the patient would like to avoid that at this time. She is not interested in any surgery. She would consider lumbar epidural steroid injection we will schedule it at her earliest convenience. She'll continue baclofen as needed. She may also use NSAIDs.\par \par The patient was educated regarding their condition, treatment options as well as prescribed course of treatment. \par Risks and benefits as well as alternatives to the proposed treatment were also provided to the patient \par They were given the opportunity to have all their questions answered to their satisfaction.\par \par Vital signs were reviewed with the patient and the patient was instructed to followup with their primary care provider for further management.\par \par Healthy lifestyle recommendations were also made including a tobacco free lifestyle, proper diet, and weight control.

## 2019-07-29 NOTE — HISTORY OF PRESENT ILLNESS
[de-identified] : Patient presents  here today for a  MRI review of the lumbar spine 07/23/2019.

## 2019-07-29 NOTE — PHYSICAL EXAM
[Obese] : obese [Stooped] : stooped [Antalgic] : antalgic [Limited] : is limited [Painful] : is painful [] : Sensory: [LE] : 5/5 motor strength in bilateral lower extremities [Knee] : patellar 2+ and symmetric bilaterally [Ankle] : ankle 2+ and symmetric bilaterally [DP] : dorsalis pedis 2+ and symmetric bilaterally [de-identified] : MRI lumbar spine without contrast performed at [facility] [place] on July 20, 2019 demonstrates L2-3 L3-4 L4-5 disc herniation deforming the thecal sac. Grade 1 spondylosis L2-3 and L1-2. Moderate stenosis at L2-3 and severe spinal stenosis at L4-5. Grade 2 spondylolisthesis at L4-5. Left-sided lumbar scoliosis. [Poor Appearance] : well-appearing [Acute Distress] : not in acute distress [Abl Mood] : in a normal mood [Abl Affect] : with normal affect [Poor Coordination] : normal coordination [Disorientation] : oriented x 3 [SLR] : negative straight leg raise [FreeTextEntry2] : The pt is awake, alert and oriented to self, place and time, is comfortable and in no acute distress. Gait examination reveals a narrow based, non-ataxic gait. Can heel and toe walk without difficulty. Inspection of neck, back and lower extremities bilaterally reveals no rashes or ecchymotic lesions.  There is no obvious abnormal spinal curvature in the sagittal and coronal planes. There is no tenderness over the cervical, thoracic or lumbar spine, or the paraspinal or upper and lower extremities musculature. There is no sacroiliac tenderness. No greater trochanteric tenderness bilaterally. No atrophy or abnormal movements noted in the upper or lower extremities. There is no swelling noted in the upper or lower extremities bilaterally. No cervical lymphadenopathy noted anteriorly. No joint laxity noted in the upper and lower extremity joints bilaterally.\par Negative straight leg raise to 45° in the sitting position bilaterally. Some limitation of left hip internal rotation to 10° which is compared to 25° on the right. [de-identified] : extension pain 10°. Flexion is to her mid tibia with no significant back pain. [de-identified] : seen with her \par parkinsons disorder. leans to the right and forward When standing or walking.\par bilateral leg pitting edema to the knees, 2+. Stigmata of chronic venous stasis disease bilateral lower extremities To her mid tibia.

## 2019-07-30 ENCOUNTER — APPOINTMENT (OUTPATIENT)
Dept: ORTHOPEDIC SURGERY | Facility: CLINIC | Age: 69
End: 2019-07-30
Payer: MEDICARE

## 2019-07-30 VITALS — SYSTOLIC BLOOD PRESSURE: 120 MMHG | DIASTOLIC BLOOD PRESSURE: 75 MMHG | HEART RATE: 76 BPM

## 2019-07-30 PROCEDURE — 73562 X-RAY EXAM OF KNEE 3: CPT | Mod: LT

## 2019-07-30 PROCEDURE — 99213 OFFICE O/P EST LOW 20 MIN: CPT

## 2019-08-02 ENCOUNTER — OUTPATIENT (OUTPATIENT)
Dept: OUTPATIENT SERVICES | Facility: HOSPITAL | Age: 69
LOS: 1 days | End: 2019-08-02
Payer: MEDICARE

## 2019-08-02 ENCOUNTER — APPOINTMENT (OUTPATIENT)
Dept: ORTHOPEDIC SURGERY | Facility: HOSPITAL | Age: 69
End: 2019-08-02

## 2019-08-02 DIAGNOSIS — M41.9 SCOLIOSIS, UNSPECIFIED: ICD-10-CM

## 2019-08-02 DIAGNOSIS — Z98.89 OTHER SPECIFIED POSTPROCEDURAL STATES: Chronic | ICD-10-CM

## 2019-08-02 DIAGNOSIS — M43.10 SPONDYLOLISTHESIS, SITE UNSPECIFIED: ICD-10-CM

## 2019-08-02 DIAGNOSIS — Z96.659 PRESENCE OF UNSPECIFIED ARTIFICIAL KNEE JOINT: Chronic | ICD-10-CM

## 2019-08-02 DIAGNOSIS — Z98.890 OTHER SPECIFIED POSTPROCEDURAL STATES: Chronic | ICD-10-CM

## 2019-08-02 DIAGNOSIS — M51.36 OTHER INTERVERTEBRAL DISC DEGENERATION, LUMBAR REGION: ICD-10-CM

## 2019-08-02 PROCEDURE — 64483 NJX AA&/STRD TFRM EPI L/S 1: CPT | Mod: 50

## 2019-08-02 PROCEDURE — 77003 FLUOROGUIDE FOR SPINE INJECT: CPT

## 2019-08-12 ENCOUNTER — APPOINTMENT (OUTPATIENT)
Dept: ORTHOPEDIC SURGERY | Facility: CLINIC | Age: 69
End: 2019-08-12
Payer: MEDICARE

## 2019-08-12 VITALS
DIASTOLIC BLOOD PRESSURE: 81 MMHG | SYSTOLIC BLOOD PRESSURE: 139 MMHG | WEIGHT: 246 LBS | HEART RATE: 72 BPM | BODY MASS INDEX: 43.59 KG/M2 | HEIGHT: 63 IN

## 2019-08-12 DIAGNOSIS — M41.9 SCOLIOSIS, UNSPECIFIED: ICD-10-CM

## 2019-08-12 DIAGNOSIS — M51.36 OTHER INTERVERTEBRAL DISC DEGENERATION, LUMBAR REGION: ICD-10-CM

## 2019-08-12 PROCEDURE — 99214 OFFICE O/P EST MOD 30 MIN: CPT

## 2019-08-12 NOTE — HISTORY OF PRESENT ILLNESS
[8] : a current pain level of 8/10 [Lifting] : worsened by lifting [Bending] : worsened by bending [Prolonged Sitting] : worsened by prolonged sitting [Sitting] : worsened by sitting [Prolonged Standing] : worsened by prolonged standing [Standing] : worsened by standing [Walking] : worsened by walking [de-identified] : lumbar epidural [de-identified] : Patient is here today 2 weeks post lumbar epidural injection 8/2/19. Overall feeling 20% improvement. Pain ranges from 8-10 usually by mid to end of day.\par Pain right more than left lower back and buttock.\par oxycodone prn.

## 2019-08-12 NOTE — REASON FOR VISIT
[Follow-Up Visit] : a follow-up visit for [Degenerative Joint Disease] : degenerative joint disease [Back Pain] : back pain [Scoliosis] : scoliosis [Spondylolistheses] : spondylolistheses [Spouse] : spouse

## 2019-08-12 NOTE — PHYSICAL EXAM
[Obese] : obese [Stooped] : stooped [Antalgic] : antalgic [Cane] : ambulates with cane [Limited] : is limited [Painful] : is painful [LE] : 5/5 motor strength in bilateral lower extremities [Knee] : patellar 2+ and symmetric bilaterally [] : Sensory: [Ankle] : ankle 2+ and symmetric bilaterally [DP] : dorsalis pedis 2+ and symmetric bilaterally [Poor Appearance] : well-appearing [Acute Distress] : not in acute distress [Abl Mood] : in a normal mood [Abl Affect] : with normal affect [Poor Coordination] : normal coordination [SLR] : negative straight leg raise [Disorientation] : oriented x 3 [FreeTextEntry2] : The pt is awake, alert and oriented to self, place and time, is comfortable and in no acute distress. Gait examination reveals a narrow based, non-ataxic gait. Can heel and toe walk without difficulty. Inspection of neck, back and lower extremities bilaterally reveals no rashes or ecchymotic lesions.  There is no obvious abnormal spinal curvature in the sagittal and coronal planes. There is no tenderness over the cervical, thoracic or lumbar spine, or the paraspinal or upper and lower extremities musculature. There is no sacroiliac tenderness. No greater trochanteric tenderness bilaterally. No atrophy or abnormal movements noted in the upper or lower extremities. There is no swelling noted in the upper or lower extremities bilaterally. No cervical lymphadenopathy noted anteriorly. No joint laxity noted in the upper and lower extremity joints bilaterally.\par Negative straight leg raise to 45° in the sitting position bilaterally. Some limitation of left hip internal rotation to 10° which is compared to 25° on the right. [de-identified] : extension pain 10°. Flexion is to her mid tibia with no significant back pain. [de-identified] : seen with her \par parkinsons disorder. leans to the right and forward When standing or walking.\par bilateral leg pitting edema to the knees, 2+. Stigmata of chronic venous stasis disease bilateral lower extremities To her mid tibia.

## 2019-08-12 NOTE — DISCUSSION/SUMMARY
[de-identified] : The patient had approximately 20% relief of symptoms following a bilateral L4 transforaminal epidural steroid injection on August 2, 2090. She like to consider another injection we will try and schedule it at her earliest convenience. Bilateral L5 transforaminal epidural steroid injections can be considered. She understands over long-term laminectomy and fusion will be more effective for her grade 2-3 spondylolisthesis L4-5 with severe disc degeneration L5-S1. For now she is not interested in any surgical interventions\par \par The patient was educated regarding their condition, treatment options as well as prescribed course of treatment. \par Risks and benefits as well as alternatives to the proposed treatment were also provided to the patient \par They were given the opportunity to have all their questions answered to their satisfaction.\par \par Vital signs were reviewed with the patient and the patient was instructed to followup with their primary care provider for further management.\par \par Healthy lifestyle recommendations were also made including a tobacco free lifestyle, proper diet, and weight control. [Medication Risks Reviewed] : Medication risks reviewed

## 2019-08-16 ENCOUNTER — APPOINTMENT (OUTPATIENT)
Dept: ORTHOPEDIC SURGERY | Facility: HOSPITAL | Age: 69
End: 2019-08-16

## 2019-08-16 ENCOUNTER — OUTPATIENT (OUTPATIENT)
Dept: OUTPATIENT SERVICES | Facility: HOSPITAL | Age: 69
LOS: 1 days | End: 2019-08-16
Payer: MEDICARE

## 2019-08-16 DIAGNOSIS — Z96.659 PRESENCE OF UNSPECIFIED ARTIFICIAL KNEE JOINT: Chronic | ICD-10-CM

## 2019-08-16 DIAGNOSIS — M43.10 SPONDYLOLISTHESIS, SITE UNSPECIFIED: ICD-10-CM

## 2019-08-16 DIAGNOSIS — Z98.89 OTHER SPECIFIED POSTPROCEDURAL STATES: Chronic | ICD-10-CM

## 2019-08-16 DIAGNOSIS — Z98.890 OTHER SPECIFIED POSTPROCEDURAL STATES: Chronic | ICD-10-CM

## 2019-08-16 DIAGNOSIS — M41.9 SCOLIOSIS, UNSPECIFIED: ICD-10-CM

## 2019-08-16 DIAGNOSIS — M51.36 OTHER INTERVERTEBRAL DISC DEGENERATION, LUMBAR REGION: ICD-10-CM

## 2019-08-16 PROCEDURE — 64483 NJX AA&/STRD TFRM EPI L/S 1: CPT | Mod: 50

## 2019-08-16 PROCEDURE — 77003 FLUOROGUIDE FOR SPINE INJECT: CPT

## 2021-06-04 ENCOUNTER — INPATIENT (INPATIENT)
Facility: HOSPITAL | Age: 71
LOS: 3 days | Discharge: HOME HEALTH | End: 2021-06-08
Attending: STUDENT IN AN ORGANIZED HEALTH CARE EDUCATION/TRAINING PROGRAM | Admitting: STUDENT IN AN ORGANIZED HEALTH CARE EDUCATION/TRAINING PROGRAM
Payer: MEDICARE

## 2021-06-04 VITALS
HEART RATE: 109 BPM | RESPIRATION RATE: 16 BRPM | SYSTOLIC BLOOD PRESSURE: 110 MMHG | DIASTOLIC BLOOD PRESSURE: 64 MMHG | OXYGEN SATURATION: 99 % | HEIGHT: 63 IN | TEMPERATURE: 99 F

## 2021-06-04 DIAGNOSIS — Z98.890 OTHER SPECIFIED POSTPROCEDURAL STATES: Chronic | ICD-10-CM

## 2021-06-04 DIAGNOSIS — I10 ESSENTIAL (PRIMARY) HYPERTENSION: ICD-10-CM

## 2021-06-04 DIAGNOSIS — A41.9 SEPSIS, UNSPECIFIED ORGANISM: ICD-10-CM

## 2021-06-04 DIAGNOSIS — Z29.9 ENCOUNTER FOR PROPHYLACTIC MEASURES, UNSPECIFIED: ICD-10-CM

## 2021-06-04 DIAGNOSIS — Z98.89 OTHER SPECIFIED POSTPROCEDURAL STATES: Chronic | ICD-10-CM

## 2021-06-04 DIAGNOSIS — G20 PARKINSON'S DISEASE: ICD-10-CM

## 2021-06-04 DIAGNOSIS — E03.9 HYPOTHYROIDISM, UNSPECIFIED: ICD-10-CM

## 2021-06-04 DIAGNOSIS — Z96.659 PRESENCE OF UNSPECIFIED ARTIFICIAL KNEE JOINT: Chronic | ICD-10-CM

## 2021-06-04 DIAGNOSIS — L03.90 CELLULITIS, UNSPECIFIED: ICD-10-CM

## 2021-06-04 DIAGNOSIS — E78.5 HYPERLIPIDEMIA, UNSPECIFIED: ICD-10-CM

## 2021-06-04 LAB
ALBUMIN SERPL ELPH-MCNC: 4.1 G/DL — SIGNIFICANT CHANGE UP (ref 3.3–5)
ALP SERPL-CCNC: 75 U/L — SIGNIFICANT CHANGE UP (ref 40–120)
ALT FLD-CCNC: <5 U/L — SIGNIFICANT CHANGE UP (ref 4–33)
ANION GAP SERPL CALC-SCNC: 15 MMOL/L — HIGH (ref 7–14)
APPEARANCE UR: CLEAR — SIGNIFICANT CHANGE UP
AST SERPL-CCNC: 14 U/L — SIGNIFICANT CHANGE UP (ref 4–32)
BACTERIA # UR AUTO: ABNORMAL
BASE EXCESS BLDV CALC-SCNC: 3.7 MMOL/L — HIGH (ref -3–2)
BASOPHILS # BLD AUTO: 0.01 K/UL — SIGNIFICANT CHANGE UP (ref 0–0.2)
BASOPHILS NFR BLD AUTO: 0.1 % — SIGNIFICANT CHANGE UP (ref 0–2)
BILIRUB SERPL-MCNC: 0.8 MG/DL — SIGNIFICANT CHANGE UP (ref 0.2–1.2)
BILIRUB UR-MCNC: NEGATIVE — SIGNIFICANT CHANGE UP
BLOOD GAS VENOUS - CREATININE: SIGNIFICANT CHANGE UP MG/DL (ref 0.5–1.3)
BLOOD GAS VENOUS COMPREHENSIVE RESULT: SIGNIFICANT CHANGE UP
BUN SERPL-MCNC: 27 MG/DL — HIGH (ref 7–23)
CALCIUM SERPL-MCNC: 9.4 MG/DL — SIGNIFICANT CHANGE UP (ref 8.4–10.5)
CHLORIDE BLDV-SCNC: 99 MMOL/L — SIGNIFICANT CHANGE UP (ref 96–108)
CHLORIDE SERPL-SCNC: 94 MMOL/L — LOW (ref 98–107)
CO2 SERPL-SCNC: 25 MMOL/L — SIGNIFICANT CHANGE UP (ref 22–31)
COLOR SPEC: SIGNIFICANT CHANGE UP
COMMENT - URINE: SIGNIFICANT CHANGE UP
CREAT SERPL-MCNC: 1.09 MG/DL — SIGNIFICANT CHANGE UP (ref 0.5–1.3)
DIFF PNL FLD: ABNORMAL
EOSINOPHIL # BLD AUTO: 0 K/UL — SIGNIFICANT CHANGE UP (ref 0–0.5)
EOSINOPHIL NFR BLD AUTO: 0 % — SIGNIFICANT CHANGE UP (ref 0–6)
EPI CELLS # UR: 10 /HPF — HIGH (ref 0–5)
GAS PNL BLDV: 134 MMOL/L — LOW (ref 136–146)
GLUCOSE BLDV-MCNC: 115 MG/DL — HIGH (ref 70–99)
GLUCOSE SERPL-MCNC: 111 MG/DL — HIGH (ref 70–99)
GLUCOSE UR QL: NEGATIVE — SIGNIFICANT CHANGE UP
HCO3 BLDV-SCNC: 26 MMOL/L — SIGNIFICANT CHANGE UP (ref 20–27)
HCT VFR BLD CALC: 37.2 % — SIGNIFICANT CHANGE UP (ref 34.5–45)
HCT VFR BLDA CALC: 36.4 % — SIGNIFICANT CHANGE UP (ref 34.5–46.5)
HGB BLD CALC-MCNC: 11.8 G/DL — SIGNIFICANT CHANGE UP (ref 11.5–15.5)
HGB BLD-MCNC: 11.8 G/DL — SIGNIFICANT CHANGE UP (ref 11.5–15.5)
IANC: 15.39 K/UL — HIGH (ref 1.5–8.5)
IMM GRANULOCYTES NFR BLD AUTO: 0.8 % — SIGNIFICANT CHANGE UP (ref 0–1.5)
KETONES UR-MCNC: NEGATIVE — SIGNIFICANT CHANGE UP
LACTATE BLDV-MCNC: 1.8 MMOL/L — SIGNIFICANT CHANGE UP (ref 0.5–2)
LEUKOCYTE ESTERASE UR-ACNC: ABNORMAL
LYMPHOCYTES # BLD AUTO: 0.57 K/UL — LOW (ref 1–3.3)
LYMPHOCYTES # BLD AUTO: 3.3 % — LOW (ref 13–44)
MCHC RBC-ENTMCNC: 31.3 PG — SIGNIFICANT CHANGE UP (ref 27–34)
MCHC RBC-ENTMCNC: 31.7 GM/DL — LOW (ref 32–36)
MCV RBC AUTO: 98.7 FL — SIGNIFICANT CHANGE UP (ref 80–100)
MONOCYTES # BLD AUTO: 1.19 K/UL — HIGH (ref 0–0.9)
MONOCYTES NFR BLD AUTO: 6.9 % — SIGNIFICANT CHANGE UP (ref 2–14)
NEUTROPHILS # BLD AUTO: 15.39 K/UL — HIGH (ref 1.8–7.4)
NEUTROPHILS NFR BLD AUTO: 88.9 % — HIGH (ref 43–77)
NITRITE UR-MCNC: POSITIVE
NRBC # BLD: 0 /100 WBCS — SIGNIFICANT CHANGE UP
NRBC # FLD: 0 K/UL — SIGNIFICANT CHANGE UP
PCO2 BLDV: 46 MMHG — SIGNIFICANT CHANGE UP (ref 41–51)
PH BLDV: 7.4 — SIGNIFICANT CHANGE UP (ref 7.32–7.43)
PH UR: 7 — SIGNIFICANT CHANGE UP (ref 5–8)
PLATELET # BLD AUTO: 216 K/UL — SIGNIFICANT CHANGE UP (ref 150–400)
PO2 BLDV: <24 MMHG — LOW (ref 35–40)
POTASSIUM BLDV-SCNC: 3.8 MMOL/L — SIGNIFICANT CHANGE UP (ref 3.4–4.5)
POTASSIUM SERPL-MCNC: 4.1 MMOL/L — SIGNIFICANT CHANGE UP (ref 3.5–5.3)
POTASSIUM SERPL-SCNC: 4.1 MMOL/L — SIGNIFICANT CHANGE UP (ref 3.5–5.3)
PROT SERPL-MCNC: 7.8 G/DL — SIGNIFICANT CHANGE UP (ref 6–8.3)
PROT UR-MCNC: ABNORMAL
RBC # BLD: 3.77 M/UL — LOW (ref 3.8–5.2)
RBC # FLD: 15.4 % — HIGH (ref 10.3–14.5)
RBC CASTS # UR COMP ASSIST: 6 /HPF — HIGH (ref 0–4)
SAO2 % BLDV: 30.2 % — LOW (ref 60–85)
SARS-COV-2 RNA SPEC QL NAA+PROBE: SIGNIFICANT CHANGE UP
SODIUM SERPL-SCNC: 134 MMOL/L — LOW (ref 135–145)
SP GR SPEC: 1.01 — SIGNIFICANT CHANGE UP (ref 1.01–1.02)
UROBILINOGEN FLD QL: SIGNIFICANT CHANGE UP
WBC # BLD: 17.29 K/UL — HIGH (ref 3.8–10.5)
WBC # FLD AUTO: 17.29 K/UL — HIGH (ref 3.8–10.5)
WBC UR QL: 14 /HPF — HIGH (ref 0–5)

## 2021-06-04 PROCEDURE — 99285 EMERGENCY DEPT VISIT HI MDM: CPT

## 2021-06-04 PROCEDURE — 93971 EXTREMITY STUDY: CPT | Mod: 26,LT

## 2021-06-04 RX ORDER — SODIUM CHLORIDE 9 MG/ML
1600 INJECTION INTRAMUSCULAR; INTRAVENOUS; SUBCUTANEOUS ONCE
Refills: 0 | Status: COMPLETED | OUTPATIENT
Start: 2021-06-04 | End: 2021-06-04

## 2021-06-04 RX ORDER — ACETAMINOPHEN 500 MG
650 TABLET ORAL ONCE
Refills: 0 | Status: COMPLETED | OUTPATIENT
Start: 2021-06-04 | End: 2021-06-04

## 2021-06-04 RX ORDER — VANCOMYCIN HCL 1 G
1000 VIAL (EA) INTRAVENOUS ONCE
Refills: 0 | Status: COMPLETED | OUTPATIENT
Start: 2021-06-04 | End: 2021-06-04

## 2021-06-04 RX ORDER — PIPERACILLIN AND TAZOBACTAM 4; .5 G/20ML; G/20ML
3.38 INJECTION, POWDER, LYOPHILIZED, FOR SOLUTION INTRAVENOUS ONCE
Refills: 0 | Status: COMPLETED | OUTPATIENT
Start: 2021-06-04 | End: 2021-06-04

## 2021-06-04 RX ADMIN — SODIUM CHLORIDE 1600 MILLILITER(S): 9 INJECTION INTRAMUSCULAR; INTRAVENOUS; SUBCUTANEOUS at 17:23

## 2021-06-04 RX ADMIN — PIPERACILLIN AND TAZOBACTAM 3.38 GRAM(S): 4; .5 INJECTION, POWDER, LYOPHILIZED, FOR SOLUTION INTRAVENOUS at 17:23

## 2021-06-04 RX ADMIN — Medication 650 MILLIGRAM(S): at 15:22

## 2021-06-04 RX ADMIN — SODIUM CHLORIDE 1600 MILLILITER(S): 9 INJECTION INTRAMUSCULAR; INTRAVENOUS; SUBCUTANEOUS at 15:22

## 2021-06-04 RX ADMIN — PIPERACILLIN AND TAZOBACTAM 200 GRAM(S): 4; .5 INJECTION, POWDER, LYOPHILIZED, FOR SOLUTION INTRAVENOUS at 15:22

## 2021-06-04 RX ADMIN — Medication 250 MILLIGRAM(S): at 17:30

## 2021-06-04 NOTE — ED PROVIDER NOTE - ATTENDING CONTRIBUTION TO CARE
71yF w/pmhx Parkinsons, s/p urostomy, HLD, hypothyroid p/w fever and left leg pain c/f cellulitis. Pt has a urostomy however urine appears clear, will send for culture as UA not reliable in this setting, treated empiricaly with vanc/zosyn, no e/o DVT On US, will kalli area of cellulitis and admit.

## 2021-06-04 NOTE — H&P ADULT - PROBLEM SELECTOR PLAN 3
On home Zetia 10mg daily, Carbidopa levodopa 25/100 1 tab 4 times per day, pramipexole 0.25mg 2tabs 4 times per day  - resume home medications On home Carbidopa levodopa 25/100 1 tab 4 times per day, pramipexole 0.25mg 2tabs 4 times per day  - resume home medications

## 2021-06-04 NOTE — H&P ADULT - PROBLEM SELECTOR PLAN 4
On home HCTZ/Triamterene 37.5/25 daily  -hold now given hypotension in the setting of active infection On home levothyroxine 125mg daily  -c/w Levothyroxine

## 2021-06-04 NOTE — H&P ADULT - NSICDXPASTMEDICALHX_GEN_ALL_CORE_FT
PAST MEDICAL HISTORY:  Hyperlipidemia     Hypothyroid     Morbid obesity     Osteoarthritis     Parkinsons disease diagnosed 5 years ago

## 2021-06-04 NOTE — ED PROVIDER NOTE - NSCAREINITIATED _GEN_ER
Vaccine Information Statement Pneumococcal Polysaccharide Vaccine: What You Need to Know Many Vaccine Information Statements are available in [place]ian and other languages. See www.immunize.org/vis. Hojas de información Sobre Vacunas están disponibles en español y en muchos otros idiomas. Visite Julien.si. 1. Why get vaccinated? Vaccination can protect older adults (and some children and younger adults) from pneumococcal disease. Pneumococcal disease is caused by bacteria that can spread from person to person through close contact. It can cause ear infections, and it can also lead to more serious infections of the: 
 Lungs (pneumonia),  Blood (bacteremia), and 
 Covering of the brain and spinal cord (meningitis). Meningitis can cause deafness and brain damage, and it can be fatal.   
 
Anyone can get pneumococcal disease, but children under 3years of age, people with certain medical conditions, adults over 72years of age, and cigarette smokers are at the highest risk. About 18,000 older adults die each year from pneumococcal disease in the [place]. Treatment of pneumococcal infections with penicillin and other drugs used to be more effective. But some strains of the disease have become resistant to these drugs. This makes prevention of the disease, through vaccination, even more important. 2. Pneumococcal polysaccharide vaccine (PPSV23) Pneumococcal polysaccharide vaccine (PPSV23) protects against 23 types of pneumococcal bacteria. It will not prevent all pneumococcal disease. PPSV23 is recommended for:  All adults 72years of age and older,  Anyone 2 through 59years of age with certain long-term health problems, 
 Anyone 2 through 59years of age with a weakened immune system, 
 Adults 23 through 59years of age who smoke cigarettes or have asthma. Most people need only one dose of PPSV.   A second dose is recommended for certain high-risk groups. People 72 and older should get a dose even if they have gotten one or more doses of the vaccine before they turned 65. Your healthcare provider can give you more information about these recommendations. Most healthy adults develop protection within 2 to 3 weeks of getting the shot. 3. Some people should not get this vaccine  Anyone who has had a life-threatening allergic reaction to PPSV should not get another dose.  Anyone who has a severe allergy to any component of PPSV should not receive it. Tell your provider if you have any severe allergies.  Anyone who is moderately or severely ill when the shot is scheduled may be asked to wait until they recover before getting the vaccine. Someone with a mild illness can usually be vaccinated.  Children less than 3years of age should not receive this vaccine.  There is no evidence that PPSV is harmful to either a pregnant woman or to her fetus. However, as a precaution, women who need the vaccine should be vaccinated before becoming pregnant, if possible. 4. Risks of a vaccine reaction With any medicine, including vaccines, there is a chance of side effects. These are usually mild and go away on their own, but serious reactions are also possible. About half of people who get PPSV have mild side effects, such as redness or pain where the shot is given, which go away within about two days. Less than 1 out of 100 people develop a fever, muscle aches, or more severe local reactions. Problems that could happen after any vaccine:  People sometimes faint after a medical procedure, including vaccination. Sitting or lying down for about 15 minutes can help prevent fainting, and injuries caused by a fall. Tell your doctor if you feel dizzy, or have vision changes or ringing in the ears.  
 
 Some people get severe pain in the shoulder and have difficulty moving the arm where a shot was given. This happens very rarely.  Any medication can cause a severe allergic reaction. Such reactions from a vaccine are very rare, estimated at about 1 in a million doses, and would happen within a few minutes to a few hours after the vaccination. As with any medicine, there is a very remote chance of a vaccine causing a serious injury or death. The safety of vaccines is always being monitored. For more information, visit: www.cdc.gov/vaccinesafety/  
 
5. What if there is a serious reaction? What should I look for? Look for anything that concerns you, such as signs of a severe allergic reaction, very high fever, or unusual behavior. Signs of a severe allergic reaction can include hives, swelling of the face and throat, difficulty breathing, a fast heartbeat, dizziness, and weakness. These would usually start a few minutes to a few hours after the vaccination. What should I do? If you think it is a severe allergic reaction or other emergency that cant wait, call 9-1-1 or get to the nearest hospital. Otherwise, call your doctor. Afterward, the reaction should be reported to the Vaccine Adverse Event Reporting System (VAERS). Your doctor might file this report, or you can do it yourself through the VAERS web site at www.vaers. hhs.gov, or by calling 1-632.362.5963. VAERS does not give medical advice. 6. How can I learn more?  Ask your doctor. He or she can give you the vaccine package insert or suggest other sources of information.  Call your local or state health department.  Contact the Centers for Disease Control and Prevention (CDC): 
- Call 2-971.449.6866 (1-800-CDC-INFO) or 
- Visit CDCs website at www.cdc.gov/vaccines Vaccine Information Statement PPSV  
04/24/2015 Department of [facility] and [employer] Centers for Disease Control and Prevention Office Use Only Diarrhea: Care Instructions Your Care Instructions Diarrhea is loose, watery stools (bowel movements). The exact cause is often hard to find. Sometimes diarrhea is your body's way of getting rid of what caused an upset stomach. Viruses, food poisoning, and many medicines can cause diarrhea. Some people get diarrhea in response to emotional stress, anxiety, or certain foods. Almost everyone has diarrhea now and then. It usually isn't serious, and your stools will return to normal soon. The important thing to do is replace the fluids you have lost, so you can prevent dehydration. The doctor has checked you carefully, but problems can develop later. If you notice any problems or new symptoms, get medical treatment right away. Follow-up care is a key part of your treatment and safety. Be sure to make and go to all appointments, and call your doctor if you are having problems. It's also a good idea to know your test results and keep a list of the medicines you take. How can you care for yourself at home? · Watch for signs of dehydration, which means your body has lost too much water. Dehydration is a serious condition and should be treated right away. Signs of dehydration are: ? Increasing thirst and dry eyes and mouth. ? Feeling faint or lightheaded. ? Darker urine, and a smaller amount of urine than normal. 
· To prevent dehydration, drink plenty of fluids, enough so that your urine is light yellow or clear like water. Choose water and other caffeine-free clear liquids until you feel better. If you have kidney, heart, or liver disease and have to limit fluids, talk with your doctor before you increase the amount of fluids you drink. · Begin eating small amounts of mild foods the next day, if you feel like it. ? Try yogurt that has live cultures of Lactobacillus. (Check the label.) ? Avoid spicy foods, fruits, alcohol, and caffeine until 48 hours after all symptoms are gone. ? Avoid chewing gum that contains sorbitol. ? Avoid dairy products (except for yogurt with Lactobacillus) while you have diarrhea and for 3 days after symptoms are gone. · The doctor may recommend that you take over-the-counter medicine, such as loperamide (Imodium), if you still have diarrhea after 6 hours. Read and follow all instructions on the label. Do not use this medicine if you have bloody diarrhea, a high fever, or other signs of serious illness. Call your doctor if you think you are having a problem with your medicine. When should you call for help? Call 911 anytime you think you may need emergency care. For example, call if: 
  · You passed out (lost consciousness).  
  · Your stools are maroon or very bloody.  
 Call your doctor now or seek immediate medical care if: 
  · You are dizzy or lightheaded, or you feel like you may faint.  
  · Your stools are black and look like tar, or they have streaks of blood.  
  · You have new or worse belly pain.  
  · You have symptoms of dehydration, such as: 
? Dry eyes and a dry mouth. ? Passing only a little dark urine. ? Feeling thirstier than usual.  
  · You have a new or higher fever.  
 Watch closely for changes in your health, and be sure to contact your doctor if: 
  · Your diarrhea is getting worse.  
  · You see pus in the diarrhea.  
  · You are not getting better after 2 days (48 hours). Where can you learn more? Go to http://kristy-boogie.info/. Enter Y477 in the search box to learn more about \"Diarrhea: Care Instructions. \" Current as of: September 23, 2018 Content Version: 12.1 © 6411-8112 [employer]. Care instructions adapted under license by [employer] (which disclaims liability or warranty for this information). If you have questions about a medical condition or this instruction, always ask your healthcare professional. [address] any warranty or liability for your use of this information. Niesha Willoughby) Prediabetes: Care Instructions Your Care Instructions Prediabetes is a warning sign that you are at risk for getting type 2 diabetes. It means that your blood sugar is higher than it should be. The food you eat turns into sugar, which your body uses for energy. Normally, an organ called the pancreas makes insulin, which allows the sugar in your blood to get into your body's cells. But when your body can't use insulin the right way, the sugar doesn't move into cells. It stays in your blood instead. This is called insulin resistance. The buildup of sugar in the blood causes prediabetes. The good news is that lifestyle changes may help you get your blood sugar back to normal and help you avoid or delay diabetes. Follow-up care is a key part of your treatment and safety. Be sure to make and go to all appointments, and call your doctor if you are having problems. It's also a good idea to know your test results and keep a list of the medicines you take. How can you care for yourself at home? · Watch your weight. A healthy weight helps your body use insulin properly. · Limit the amount of calories, sweets, and unhealthy fat you eat. Ask your doctor if you should see a dietitian. A registered dietitian can help you create meal plans that fit your lifestyle. · Get at least 30 minutes of exercise on most days of the week. Exercise helps control your blood sugar. It also helps you maintain a healthy weight. Walking is a good choice. You also may want to do other activities, such as running, swimming, cycling, or playing tennis or team sports. · Do not smoke. Smoking can make prediabetes worse. If you need help quitting, talk to your doctor about stop-smoking programs and medicines. These can increase your chances of quitting for good. · If your doctor prescribed medicines, take them exactly as prescribed. Call your doctor if you think you are having a problem with your medicine. You will get more details on the specific medicines your doctor prescribes. When should you call for help? Watch closely for changes in your health, and be sure to contact your doctor if: 
  · You have any symptoms of diabetes. These may include: 
? Being thirsty more often. ? Urinating more. ? Being hungrier. ? Losing weight. ? Being very tired. ? Having blurry vision.  
  · You have a wound that will not heal.  
  · You have an infection that will not go away.  
  · You have problems with your blood pressure.  
  · You want more information about diabetes and how you can keep from getting it. Where can you learn more? Go to http://kristy-boogie.info/. Enter I222 in the search box to learn more about \"Prediabetes: Care Instructions. \" Current as of: July 25, 2018 Content Version: 12.1 © 0047-8933 [employer]. Care instructions adapted under license by [employer] (which disclaims liability or warranty for this information). If you have questions about a medical condition or this instruction, always ask your healthcare professional. [address] any warranty or liability for your use of this information. Learning About Vitamin D Why is it important to get enough vitamin D? Your body needs vitamin D to absorb calcium. Calcium keeps your bones and muscles, including your heart, healthy and strong. If your muscles don't get enough calcium, they can cramp, hurt, or feel weak. You may have long-term (chronic) muscle aches and pains. If you don't get enough vitamin D throughout life, you have an increased chance of having thin and brittle bones (osteoporosis) in your later years. Children who don't get enough vitamin D may not grow as much as others their age. They also have a chance of getting a rare disease called rickets. It causes weak bones. Vitamin D and calcium are added to many foods.  And your body uses sunshine to make its own vitamin D. How much vitamin D do you need? The [place] of [employer] recommends that people ages 3 through 79 get 600 IU (international units) every day. Adults 71 and older need 800 IU every day. Blood tests for vitamin D can check your vitamin D level. But there is no standard normal range used by all laboratories. You're likely getting enough vitamin D if your levels are in the range of 20 to 50 ng/mL. How can you get more vitamin D? Foods that contain vitamin D include: 
· [place], tuna, and mackerel. These are some of the best foods to eat when you need to get more vitamin D. 
· Cheese, egg yolks, and beef liver. These foods have vitamin D in small amounts. · Milk, soy drinks, orange juice, yogurt, margarine, and some kinds of cereal have vitamin D added to them. Some people don't make vitamin D as well as others. They may have to take extra care in getting enough vitamin D. Things that reduce how much vitamin D your body makes include: · Dark skin, such as many  Americans have. · Age, especially if you are older than 72. · Digestive problems, such as Crohn's or celiac disease. · Liver and kidney disease. Some people who do not get enough vitamin D may need supplements. Are there any risks from taking vitamin D? 
· Too much vitamin D: 
? Can damage your kidneys. ? Can cause nausea and vomiting, constipation, and weakness. ? Raises the amount of calcium in your blood. If this happens, you can get confused or have an irregular heart rhythm. · Vitamin D may interact with other medicines. Tell your doctor about all of the medicines you take, including over-the-counter drugs, herbs, and pills. Tell your doctor about all of your current medical problems. Where can you learn more? Go to http://kristy-boogie.info/. Enter R930 in the search box to learn more about \"Learning About Vitamin D.\" 
Current as of: November 7, 2018 Content Version: 12.1 © 5693-7907 [employer]. Care instructions adapted under license by [employer] (which disclaims liability or warranty for this information). If you have questions about a medical condition or this instruction, always ask your healthcare professional. [address] any warranty or liability for your use of this information. Learning About Diabetes Food Guidelines Your Care Instructions Meal planning is important to manage diabetes. It helps keep your blood sugar at a target level (which you set with your doctor). You don't have to eat special foods. You can eat what your family eats, including sweets once in a while. But you do have to pay attention to how often you eat and how much you eat of certain foods. You may want to work with a dietitian or a certified diabetes educator (CDE) to help you plan meals and snacks. A dietitian or CDE can also help you lose weight if that is one of your goals. What should you know about eating carbs? Managing the amount of carbohydrate (carbs) you eat is an important part of healthy meals when you have diabetes. Carbohydrate is found in many foods. · Learn which foods have carbs. And learn the amounts of carbs in different foods. ? Bread, cereal, pasta, and rice have about 15 grams of carbs in a serving. A serving is 1 slice of bread (1 ounce), ½ cup of cooked cereal, or 1/3 cup of cooked pasta or rice. ? Fruits have 15 grams of carbs in a serving. A serving is 1 small fresh fruit, such as an apple or orange; ½ of a banana; ½ cup of cooked or canned fruit; ½ cup of fruit juice; 1 cup of melon or raspberries; or 2 tablespoons of dried fruit. ? Milk and no-sugar-added yogurt have 15 grams of carbs in a serving. A serving is 1 cup of milk or 2/3 cup of no-sugar-added yogurt. ? Starchy vegetables have 15 grams of carbs in a serving.  A serving is ½ cup of mashed potatoes or sweet potato; 1 cup winter squash; ½ of a small baked potato; ½ cup of cooked beans; or ½ cup cooked corn or green peas. · Learn how much carbs to eat each day and at each meal. A dietitian or CDE can teach you how to keep track of the amount of carbs you eat. This is called carbohydrate counting. · If you are not sure how to count carbohydrate grams, use the Plate Method to plan meals. It is a good, quick way to make sure that you have a balanced meal. It also helps you spread carbs throughout the day. ? Divide your plate by types of foods. Put non-starchy vegetables on half the plate, meat or other protein food on one-quarter of the plate, and a grain or starchy vegetable in the final quarter of the plate. To this you can add a small piece of fruit and 1 cup of milk or yogurt, depending on how many carbs you are supposed to eat at a meal. 
· Try to eat about the same amount of carbs at each meal. Do not \"save up\" your daily allowance of carbs to eat at one meal. 
· Proteins have very little or no carbs per serving. Examples of proteins are beef, chicken, turkey, fish, eggs, tofu, cheese, cottage cheese, and peanut butter. A serving size of meat is 3 ounces, which is about the size of a deck of cards. Examples of meat substitute serving sizes (equal to 1 ounce of meat) are 1/4 cup of cottage cheese, 1 egg, 1 tablespoon of peanut butter, and ½ cup of tofu. How can you eat out and still eat healthy? · Learn to estimate the serving sizes of foods that have carbohydrate. If you measure food at home, it will be easier to estimate the amount in a serving of restaurant food. · If the meal you order has too much carbohydrate (such as potatoes, corn, or baked beans), ask to have a low-carbohydrate food instead. Ask for a salad or green vegetables. · If you use insulin, check your blood sugar before and after eating out to help you plan how much to eat in the future.  
· If you eat more carbohydrate at a meal than you had planned, take a walk or do other exercise. This will help lower your blood sugar. What else should you know? · Limit saturated fat, such as the fat from meat and dairy products. This is a healthy choice because people who have diabetes are at higher risk of heart disease. So choose lean cuts of meat and nonfat or low-fat dairy products. Use olive or canola oil instead of butter or shortening when cooking. · Don't skip meals. Your blood sugar may drop too low if you skip meals and take insulin or certain medicines for diabetes. · Check with your doctor before you drink alcohol. Alcohol can cause your blood sugar to drop too low. Alcohol can also cause a bad reaction if you take certain diabetes medicines. Follow-up care is a key part of your treatment and safety. Be sure to make and go to all appointments, and call your doctor if you are having problems. It's also a good idea to know your test results and keep a list of the medicines you take. Where can you learn more? Go to http://kristy-boogie.info/. Enter D271 in the search box to learn more about \"Learning About Diabetes Food Guidelines. \" Current as of: July 25, 2018 Content Version: 12.1 © 6314-4461 Healthwise, Inc[employer]. Care instructions adapted under license by [employer] (which disclaims liability or warranty for this information). If you have questions about a medical condition or this instruction, always ask your healthcare professional. [address] any warranty or liability for your use of this information.

## 2021-06-04 NOTE — ED ADULT TRIAGE NOTE - CHIEF COMPLAINT QUOTE
Pt has urostomy c/o discolored urine she noticed Wednesday. PMH Parkinson's Pt endorses low grade fevers x 3 days

## 2021-06-04 NOTE — H&P ADULT - PROBLEM SELECTOR PLAN 6
DVT ppx: Lovenox 40 SQ daily On home HCTZ/Triamterene 37.5/25 daily  -hold now given hypotension in the setting of active infection essential hypertension  On home HCTZ/Triamterene 37.5/25 daily  -hold now given hypotension in the setting of active infection

## 2021-06-04 NOTE — ED ADULT NURSE REASSESSMENT NOTE - NS ED NURSE REASSESS COMMENT FT1
patient alert ox3 , c/o leaking urostomy bag. patient changed the bag with home brought spare bag . admitted. awaiting on bed. will continue to monitor.

## 2021-06-04 NOTE — ED ADULT NURSE NOTE - OBJECTIVE STATEMENT
Pt received to  25 c/o fever x 2 days. AxOx4 and ambulatory with cane at baseline. Pt endorses "low grade fevers" x couple days, pt arrives to  with urostomy in place draining clear yellow urine, no signs/symptoms of infection noted to area. Redness/swelling/warmth to touch noted to LLE, pt endorses pain to area upon palpation. Pt appears comfortable, in NAD, respirations even/unlabored, sinus tachycardia on monitor, pt rectally febrile. Pt denies headache, dizziness, chest pain, SOB, nausea, vomiting, diarrhea, chills, cough. 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 MD at bedside for eval, awaiting further orders, pt's spouse at bedside, will continue to monitor.

## 2021-06-04 NOTE — H&P ADULT - PROBLEM SELECTOR PLAN 2
Non-purulent left lower extremity cellulitis. Erythema with poorly defined borders, warmth and tenderness diffusely from proximal left dorsal foot to upper shin, marked by pen. No abscess or purulence appreciated.  -antibiotics as above  -PT consult given recent difficulty with weight bearing and ambulation Non-purulent left lower extremity cellulitis. Erythema with poorly defined borders, warmth and tenderness from proximal left dorsal foot to upper shin, marked by pen. No abscess or purulence appreciated.  -antibiotics as above  -PT consult given recent difficulty with weight bearing and ambulation

## 2021-06-04 NOTE — H&P ADULT - NSHPREVIEWOFSYSTEMS_GEN_ALL_CORE
REVIEW OF SYSTEMS:  CONSTITUTIONAL: Positive for weakness, fevers, chills  EYES/ENT: No visual changes;  No vertigo or throat pain   NECK: No pain or stiffness  RESPIRATORY: No cough, wheezing, hemoptysis; No shortness of breath  CARDIOVASCULAR: No chest pain or palpitations  GASTROINTESTINAL: No abdominal or epigastric pain. No nausea, vomiting, or hematemesis; No diarrhea or constipation. No melena or hematochezia.  GENITOURINARY: No dysuria, frequency or hematuria  NEUROLOGICAL: No numbness or weakness  SKIN: left leg rash REVIEW OF SYSTEMS:  CONSTITUTIONAL: Positive for weakness, fevers, chills  EYES/ENT: No visual changes;  No vertigo or throat pain   NECK: No pain or stiffness  RESPIRATORY: No cough, wheezing, hemoptysis; No shortness of breath  CARDIOVASCULAR: No chest pain or palpitations; (+)LLE edema  GASTROINTESTINAL: No abdominal or epigastric pain. No nausea, vomiting, or hematemesis; No diarrhea or constipation. No melena or hematochezia.  GENITOURINARY: No dysuria, frequency or hematuria  NEUROLOGICAL: No numbness, paresthesias or weakness  MSK: ambulates with cane; mechanical fall last week without LOC or head trauma; (+)LLE pain with ambulation  SKIN: left leg rash

## 2021-06-04 NOTE — ED PROVIDER NOTE - CLINICAL SUMMARY MEDICAL DECISION MAKING FREE TEXT BOX
71yF w/pmhx Parkinsons, s/p urostomy, HLD, hypothyroid p/w fever and left leg pain. On exam pt has LLE cellulitis will r/o DVT, vitals tachycardic/febrile concern for sepsis. Plan: cbc/cmp/vbg, US LLE, ua/ucx, IV fluids/broad spectrum abx. Area marked off.

## 2021-06-04 NOTE — H&P ADULT - HISTORY OF PRESENT ILLNESS
Patient is a 71yoF with PMH of Parkinsons, HTN, malignant bladder polyps s/p bladder resection and urostomy, HLD, hypothyroidism, now presenting to the ED with a three day history of fever and left leg pain. Patient reports she first noted a fever of [id number]=[id number] with associated chills at home 2 days prior to admission. She then developed leg pain that was most pronounced with activity, and has not been able to ambulate for the past 2 days. Reports redness and swelling in her left leg. Reports 2 episodes of non bloody diarrhea yesterday. She was started on bactrim by her PCP the night prior to admission. Patient denies any drainage from her left leg, denies any past history of cellulitis. Denies chest pain, palpitations, shortness of breath, dizziness, abdominal pain, nausea, vomiting, constipation, or pain or redness at urostomy site.     In the ED: febrile to T=101.7F, AX=947, RE=970/61, RR=16, 99% on RA. Pertinent labs: WBC=17.29, Neutr=88.9%, BUN/Cr=27/1.09. UA positive, but less reliable given urostomy.  Patient given tylenol, zosyn x1, Vanc x1, NS 1.6L bolus x1 Patient is a 71yoF with PMH of Parkinsons, HTN, malignant bladder polyps s/p bladder resection and urostomy, HLD, hypothyroidism, now presenting to the ED with a three day history of fever and left leg pain. Patient reports she first noted a fever of O=221E with associated chills at home 2 days prior to admission. She then developed leg pain that was most pronounced with activity, and has not been able to ambulate for the past 2 days. Reports redness and swelling in her left leg. Reports 2 episodes of non bloody diarrhea yesterday. She was started on bactrim by her urologist the night prior to admission. Patient denies any drainage from her left leg, denies any past history of cellulitis. Denies chest pain, palpitations, shortness of breath, dizziness, abdominal pain, nausea, vomiting, constipation, or pain or redness at urostomy site.     In the ED: febrile to T=101.7F, KO=756, IY=119/61, RR=16, 99% on RA. Pertinent labs: WBC=17.29, Neutr=88.9%, BUN/Cr=27/1.09. UA positive, but less reliable given urostomy.  Patient given tylenol, zosyn x1, Vanc x1, NS 1.6L bolus x1

## 2021-06-04 NOTE — ED PROVIDER NOTE - PHYSICAL EXAMINATION
LLE erythema to anterior shin LLE erythema to anterior shin  urostomy without surrounding erythema or signs of infection

## 2021-06-04 NOTE — ED PROVIDER NOTE - OBJECTIVE STATEMENT
71yF w/pmhx Parkinsons, s/p urostomy, HLD, hypothyroid p/w fever and left leg pain. pt states 2 days ago she developed fatigue, fever tmax 101 and left leg pain. Pt reports dark urine yesterday and was started on Bactrim by her PMD last night. Pt denies abd pain, n/v/d, cp, sob, recent travel, hx DVT, dizziness, lightheadedness or any other concerns. 71yF w/pmhx Parkinsons, s/p urostomy, HLD, hypothyroid p/w fever and left leg pain. pt states 2 days ago she developed fatigue, fever tmax 101 and left leg pain. Pt reports dark urine yesterday and was started on Bactrim by her PMD last night, no urine results yet. Pt states she has LLE redness as well. Pt denies abd pain, n/v/d, cp, sob, recent travel, hx DVT, dizziness, lightheadedness or any other concerns.

## 2021-06-04 NOTE — H&P ADULT - NSHPPHYSICALEXAM_GEN_ALL_CORE
T(C): 36.8 (06-04-21 @ 21:28), Max: 38.7 (06-04-21 @ 14:41)  HR: 97 (06-04-21 @ 21:28) (90 - 113)  BP: 115/58 (06-04-21 @ 21:28) (107/51 - 138/61)  RR: 18 (06-04-21 @ 21:28) (16 - 18)  SpO2: 98% (06-04-21 @ 21:28) (96% - 99%)    PHYSICAL EXAM:  GENERAL: NAD, resting in bed  HEAD:  Atraumatic, Normocephalic  EYES: EOMI, PERRLA, conjunctiva and sclera clear  ENMT: Dry mucous membranes  NECK: Supple, No JVD  CHEST/LUNG: Clear to auscultation bilaterally; No rales, rhonchi, wheezing  HEART: Tachycardic rate, regular rhythm; No murmurs, rubs, or gallops  ABDOMEN: Soft, Nontender, Nondistended; Bowel sounds present, Urostomy in place without any erythema, discharge or tenderness to palpation  EXTREMITIES:  +erythema with poorly defined borders, warmth and tenderness diffusely from proximal left dorsal foot to upper shin, marked by pen. No abscess or purulence appreciated. 2+ pitting edema in LLE, 1+ edema in RLE, 2+ Peripheral Pulses  LYMPH: No lymphadenopathy noted  SKIN: No rashes or lesions  NERVOUS SYSTEM:  Alert & Oriented X3, Good concentration; No focal deficits. T(C): 36.8 (06-04-21 @ 21:28), Max: 38.7 (06-04-21 @ 14:41)  HR: 97 (06-04-21 @ 21:28) (90 - 113)  BP: 115/58 (06-04-21 @ 21:28) (107/51 - 138/61)  RR: 18 (06-04-21 @ 21:28) (16 - 18)  SpO2: 98% (06-04-21 @ 21:28) (96% - 99%)    PHYSICAL EXAM:  GENERAL: NAD, resting in bed  HEAD:  Atraumatic, Normocephalic  EYES: EOMI, PERRLA, conjunctiva and sclera clear  ENMT: Dry mucous membranes  NECK: Supple, No JVD  CHEST/LUNG: Clear to auscultation bilaterally; No rales, rhonchi, wheezing  HEART: Tachycardic rate, regular rhythm; No murmurs, rubs, or gallops  ABDOMEN: Soft, Nontender, Nondistended; Bowel sounds present, Urostomy in place without any erythema, discharge or tenderness to palpation  EXTREMITIES:  +erythema with poorly defined borders, warmth and tenderness from proximal left dorsal foot to upper shin, marked by pen. No abscess or purulence appreciated. 2+ pitting edema in LLE, 1+ edema in RLE, 2+ Peripheral Pulses  LYMPH: No lymphadenopathy noted  SKIN: No rashes or lesions  NERVOUS SYSTEM:  Alert & Oriented X3, Good concentration; No focal deficits. T(C): 36.8 (06-04-21 @ 21:28), Max: 38.7 (06-04-21 @ 14:41)  HR: 97 (06-04-21 @ 21:28) (90 - 113)  BP: 115/58 (06-04-21 @ 21:28) (107/51 - 138/61)  RR: 18 (06-04-21 @ 21:28) (16 - 18)  SpO2: 98% (06-04-21 @ 21:28) (96% - 99%)    PHYSICAL EXAM:  GENERAL: NAD, resting in bed  HEAD:  Atraumatic, Normocephalic  EYES: EOMI, PERRLA, conjunctiva and sclera clear  ENMT: Dry mucous membranes  NECK: Supple, No JVD  CHEST/LUNG: Clear to auscultation bilaterally; No rales, rhonchi, wheezing  HEART: S1S2 Tachycardic rate, regular rhythm; No murmurs, rubs, or gallops  ABDOMEN: Soft, Nontender, Nondistended; Bowel sounds present, Urostomy in place without any erythema, discharge or tenderness to palpation; clear yellow urine in bag  EXTREMITIES:  +erythema with poorly defined borders, warmth and tenderness from proximal left dorsal foot to upper shin, marked by pen. No abscess or purulence appreciated. 2+ pitting edema in LLE, 1+ edema in RLE, 2+ Peripheral Pulses. 5/5 strength b/l hip flexors  LYMPH: No lymphadenopathy noted  NERVOUS SYSTEM:  Alert & Oriented X3, Good concentration; No focal deficits.

## 2021-06-04 NOTE — H&P ADULT - NSHPLABSRESULTS_GEN_ALL_CORE
LABS: Personally reviewed labs, imaging, and ECG                          11.8   17.29 )-----------( 216      ( 2021 15:21 )             37.2       06-04    134<L>  |  94<L>  |  27<H>  ----------------------------<  111<H>  4.1   |  25  |  1.09    Ca    9.4      2021 15:20    TPro  7.8  /  Alb  4.1  /  TBili  0.8  /  DBili  x   /  AST  14  /  ALT  <5  /  AlkPhos  75  06-04       LIVER FUNCTIONS - ( 2021 15:20 )  Alb: 4.1 g/dL / Pro: 7.8 g/dL / ALK PHOS: 75 U/L / ALT: <5 U/L / AST: 14 U/L / GGT: x                    Urinalysis Basic - ( 2021 15:21 )    Color: Light Yellow / Appearance: Clear / S.015 / pH: x  Gluc: x / Ketone: Negative  / Bili: Negative / Urobili: <2 mg/dL   Blood: x / Protein: 30 mg/dL / Nitrite: Positive   Leuk Esterase: Small / RBC: 6 /HPF / WBC 14 /HPF   Sq Epi: x / Non Sq Epi: 10 /HPF / Bacteria: Moderate            Lactate Trend            CAPILLARY BLOOD GLUCOSE                RADIOLOGY & ADDITIONAL TESTS: LABS: Personally reviewed labs, imaging, and ECG                        11.8   17.29 )-----------( 216      ( 2021 15:21 )             37.2     06-04    134<L>  |  94<L>  |  27<H>  ----------------------------<  111<H>  4.1   |  25  |  1.09    Ca    9.4      2021 15:20    TPro  7.8  /  Alb  4.1  /  TBili  0.8  /  DBili  x   /  AST  14  /  ALT  <5  /  AlkPhos  75  06-04     LIVER FUNCTIONS - ( 2021 15:20 )  Alb: 4.1 g/dL / Pro: 7.8 g/dL / ALK PHOS: 75 U/L / ALT: <5 U/L / AST: 14 U/L / GGT: x           15:21 - VBG - pH: 7.40  | pCO2: 46    | pO2: <24   | Lactate: 1.8      Urinalysis Basic - ( 2021 15:21 )  Color: Light Yellow / Appearance: Clear / S.015 / pH: x  Gluc: x / Ketone: Negative  / Bili: Negative / Urobili: <2 mg/dL   Blood: x / Protein: 30 mg/dL / Nitrite: Positive   Leuk Esterase: Small / RBC: 6 /HPF / WBC 14 /HPF   Sq Epi: x / Non Sq Epi: 10 /HPF / Bacteria: Moderate    COVID-19 PCR: NotDetec (21 @ 15:21)    RADIOLOGY & ADDITIONAL TESTS:  < from: US Duplex Venous Lower Ext Ltd, Left (21 @ 15:47) >  There is normal compressibility of the left common femoral, femoral and popliteal veins. The contralateral common femoral vein is patent. Doppler examination shows normal spontaneous and phasic flow. No calf vein thrombosis is detected. Prominent left inguinal lymph nodes, likely reactive.   IMPRESSION: No evidence of left lower extremity deep venous thrombosis.  < end of copied text > LABS: Personally reviewed labs, imaging, and ECG                        11.8   17.29 )-----------( 216      ( 2021 15:21 )             37.2     06-04    134<L>  |  94<L>  |  27<H>  ----------------------------<  111<H>  4.1   |  25  |  1.09    Ca    9.4      2021 15:20    TPro  7.8  /  Alb  4.1  /  TBili  0.8  /  DBili  x   /  AST  14  /  ALT  <5  /  AlkPhos  75  06-04     LIVER FUNCTIONS - ( 2021 15:20 )  Alb: 4.1 g/dL / Pro: 7.8 g/dL / ALK PHOS: 75 U/L / ALT: <5 U/L / AST: 14 U/L / GGT: x           15:21 - VBG - pH: 7.40  | pCO2: 46    | pO2: <24   | Lactate: 1.8      Urinalysis Basic - ( 2021 15:21 )  Color: Light Yellow / Appearance: Clear / S.015 / pH: x  Gluc: x / Ketone: Negative  / Bili: Negative / Urobili: <2 mg/dL   Blood: x / Protein: 30 mg/dL / Nitrite: Positive   Leuk Esterase: Small / RBC: 6 /HPF / WBC 14 /HPF   Sq Epi: x / Non Sq Epi: 10 /HPF / Bacteria: Moderate    COVID-19 PCR: NotDetec (21 @ 15:21)    RADIOLOGY & ADDITIONAL TESTS:  < from: US Duplex Venous Lower Ext Ltd, Left (21 @ 15:47) >  There is normal compressibility of the left common femoral, femoral and popliteal veins. The contralateral common femoral vein is patent. Doppler examination shows normal spontaneous and phasic flow. No calf vein thrombosis is detected. Prominent left inguinal lymph nodes, likely reactive.   IMPRESSION: No evidence of left lower extremity deep venous thrombosis.  < end of copied text >    EKG personally reviewed - 96bpm NSR, TWI III, QTc 497ms (my calculation 455ms; no prior ekgs in system)

## 2021-06-04 NOTE — H&P ADULT - ASSESSMENT
Patient is a 71yoF with PMH of Parkinsons, HTN, malignant bladder polyps s/p bladder resection and urostomy, HLD, hypothyroidism, now presenting to the ED with sepsis likely secondary to nonpurulent left lower extremity cellulitis vs. urostomy infection.

## 2021-06-04 NOTE — ED PROVIDER NOTE - PROGRESS NOTE DETAILS
LITO Willoughby: Duplex negative, LLE cellulitis, pt agrees with admission. Spoke with hospitalist who accepts pt.

## 2021-06-04 NOTE — H&P ADULT - NSICDXFAMILYHX_GEN_ALL_CORE_FT
FAMILY HISTORY:  Father  Still living? Unknown  Family history of heart disease, Age at diagnosis: Age Unknown    Mother  Still living? No  Family history of heart disease, Age at diagnosis: Age Unknown

## 2021-06-04 NOTE — H&P ADULT - ATTENDING COMMENTS
71-year-old female with HTN, HLD, Parkinson's bladder ca s/p resection/urostomy, presenting with difficulty ambulating x2d, LLE edema/pain, erythema x1d. Had T 101 at home. (+) sepsis secondary to cellulitis +/- UTI. Reports history of asymptomatic UTI in past associated with ?bacteremia/hardware infection years ago, treating with broad spectrum abx for now pending cultures. 71-year-old female with HTN, HLD, Parkinson's, malignant bladder polyps s/p resection/urostomy, presenting with difficulty ambulating x2d, LLE edema/pain, erythema x1d. Had T 101 at home. (+) sepsis secondary to cellulitis +/- UTI. Reports history of asymptomatic UTI in past associated with ?bacteremia/hardware infection years ago, treating with broad spectrum abx for now pending cultures. 71-year-old female with HTN, HLD, Parkinson's, malignant bladder polyps s/p bladder resection/urostomy (no history of chemo/RT), presenting with difficulty ambulating x2d, LLE edema/pain, erythema x1d. Had T 101 at home. (+) sepsis secondary to cellulitis +/- UTI. Reports history of asymptomatic UTI in past associated with ?bacteremia/hardware infection years ago, treating with broad spectrum abx for now pending cultures.

## 2021-06-04 NOTE — H&P ADULT - NSICDXPASTSURGICALHX_GEN_ALL_CORE_FT
PAST SURGICAL HISTORY:  History of urostomy     S/P  x 2    S/P knee replacement bilateral 1 week apart     S/P knee surgery s/p cement spacer due to bacterial infection 2017

## 2021-06-04 NOTE — H&P ADULT - NSHPSOCIALHISTORY_GEN_ALL_CORE
Patient denies tobacco, alcohol or drug use. Lives at home with her , and can perform all ADLs usually, thouh difficulty with ambulation in the last 2 days due to new left leg pain. Patient denies tobacco, alcohol or drug use. Lives at home with her , and can perform all ADLs usually, though difficulty with ambulation in the last 2 days due to new left leg pain.

## 2021-06-05 LAB
ALBUMIN SERPL ELPH-MCNC: 3.4 G/DL — SIGNIFICANT CHANGE UP (ref 3.3–5)
ALP SERPL-CCNC: 64 U/L — SIGNIFICANT CHANGE UP (ref 40–120)
ALT FLD-CCNC: <5 U/L — LOW (ref 4–33)
ANION GAP SERPL CALC-SCNC: 13 MMOL/L — SIGNIFICANT CHANGE UP (ref 7–14)
AST SERPL-CCNC: 12 U/L — SIGNIFICANT CHANGE UP (ref 4–32)
BASOPHILS # BLD AUTO: 0.02 K/UL — SIGNIFICANT CHANGE UP (ref 0–0.2)
BASOPHILS NFR BLD AUTO: 0.2 % — SIGNIFICANT CHANGE UP (ref 0–2)
BILIRUB SERPL-MCNC: 0.5 MG/DL — SIGNIFICANT CHANGE UP (ref 0.2–1.2)
BUN SERPL-MCNC: 21 MG/DL — SIGNIFICANT CHANGE UP (ref 7–23)
CALCIUM SERPL-MCNC: 9.1 MG/DL — SIGNIFICANT CHANGE UP (ref 8.4–10.5)
CHLORIDE SERPL-SCNC: 103 MMOL/L — SIGNIFICANT CHANGE UP (ref 98–107)
CO2 SERPL-SCNC: 23 MMOL/L — SIGNIFICANT CHANGE UP (ref 22–31)
COVID-19 SPIKE DOMAIN AB INTERP: POSITIVE
COVID-19 SPIKE DOMAIN ANTIBODY RESULT: >250 U/ML — HIGH
CREAT SERPL-MCNC: 0.92 MG/DL — SIGNIFICANT CHANGE UP (ref 0.5–1.3)
CULTURE RESULTS: SIGNIFICANT CHANGE UP
EOSINOPHIL # BLD AUTO: 0.02 K/UL — SIGNIFICANT CHANGE UP (ref 0–0.5)
EOSINOPHIL NFR BLD AUTO: 0.2 % — SIGNIFICANT CHANGE UP (ref 0–6)
GLUCOSE SERPL-MCNC: 104 MG/DL — HIGH (ref 70–99)
HCT VFR BLD CALC: 33.5 % — LOW (ref 34.5–45)
HCV AB S/CO SERPL IA: 0.08 S/CO — SIGNIFICANT CHANGE UP (ref 0–0.99)
HCV AB SERPL-IMP: SIGNIFICANT CHANGE UP
HGB BLD-MCNC: 10.5 G/DL — LOW (ref 11.5–15.5)
IANC: 8.2 K/UL — SIGNIFICANT CHANGE UP (ref 1.5–8.5)
IMM GRANULOCYTES NFR BLD AUTO: 0.4 % — SIGNIFICANT CHANGE UP (ref 0–1.5)
LYMPHOCYTES # BLD AUTO: 0.6 K/UL — LOW (ref 1–3.3)
LYMPHOCYTES # BLD AUTO: 6.2 % — LOW (ref 13–44)
MAGNESIUM SERPL-MCNC: 2.2 MG/DL — SIGNIFICANT CHANGE UP (ref 1.6–2.6)
MCHC RBC-ENTMCNC: 31.3 GM/DL — LOW (ref 32–36)
MCHC RBC-ENTMCNC: 31.3 PG — SIGNIFICANT CHANGE UP (ref 27–34)
MCV RBC AUTO: 100 FL — SIGNIFICANT CHANGE UP (ref 80–100)
MONOCYTES # BLD AUTO: 0.83 K/UL — SIGNIFICANT CHANGE UP (ref 0–0.9)
MONOCYTES NFR BLD AUTO: 8.5 % — SIGNIFICANT CHANGE UP (ref 2–14)
NEUTROPHILS # BLD AUTO: 8.2 K/UL — HIGH (ref 1.8–7.4)
NEUTROPHILS NFR BLD AUTO: 84.5 % — HIGH (ref 43–77)
NRBC # BLD: 0 /100 WBCS — SIGNIFICANT CHANGE UP
NRBC # FLD: 0 K/UL — SIGNIFICANT CHANGE UP
PHOSPHATE SERPL-MCNC: 2.5 MG/DL — SIGNIFICANT CHANGE UP (ref 2.5–4.5)
PLATELET # BLD AUTO: 192 K/UL — SIGNIFICANT CHANGE UP (ref 150–400)
POTASSIUM SERPL-MCNC: 3.5 MMOL/L — SIGNIFICANT CHANGE UP (ref 3.5–5.3)
POTASSIUM SERPL-SCNC: 3.5 MMOL/L — SIGNIFICANT CHANGE UP (ref 3.5–5.3)
PROT SERPL-MCNC: 6.9 G/DL — SIGNIFICANT CHANGE UP (ref 6–8.3)
RBC # BLD: 3.35 M/UL — LOW (ref 3.8–5.2)
RBC # FLD: 15.6 % — HIGH (ref 10.3–14.5)
SARS-COV-2 IGG+IGM SERPL QL IA: >250 U/ML — HIGH
SARS-COV-2 IGG+IGM SERPL QL IA: POSITIVE
SODIUM SERPL-SCNC: 139 MMOL/L — SIGNIFICANT CHANGE UP (ref 135–145)
SPECIMEN SOURCE: SIGNIFICANT CHANGE UP
WBC # BLD: 9.71 K/UL — SIGNIFICANT CHANGE UP (ref 3.8–10.5)
WBC # FLD AUTO: 9.71 K/UL — SIGNIFICANT CHANGE UP (ref 3.8–10.5)

## 2021-06-05 PROCEDURE — 99223 1ST HOSP IP/OBS HIGH 75: CPT | Mod: GC

## 2021-06-05 PROCEDURE — 12345: CPT | Mod: NC

## 2021-06-05 RX ORDER — CITALOPRAM 10 MG/1
10 TABLET, FILM COATED ORAL DAILY
Refills: 0 | Status: DISCONTINUED | OUTPATIENT
Start: 2021-06-05 | End: 2021-06-08

## 2021-06-05 RX ORDER — PRAMIPEXOLE DIHYDROCHLORIDE 0.12 MG/1
0.5 TABLET ORAL
Refills: 0 | Status: DISCONTINUED | OUTPATIENT
Start: 2021-06-05 | End: 2021-06-08

## 2021-06-05 RX ORDER — SODIUM CHLORIDE 9 MG/ML
1000 INJECTION INTRAMUSCULAR; INTRAVENOUS; SUBCUTANEOUS
Refills: 0 | Status: DISCONTINUED | OUTPATIENT
Start: 2021-06-05 | End: 2021-06-07

## 2021-06-05 RX ORDER — EZETIMIBE 10 MG/1
10 TABLET ORAL DAILY
Refills: 0 | Status: DISCONTINUED | OUTPATIENT
Start: 2021-06-05 | End: 2021-06-08

## 2021-06-05 RX ORDER — CARBIDOPA AND LEVODOPA 25; 100 MG/1; MG/1
1 TABLET ORAL
Refills: 0 | Status: DISCONTINUED | OUTPATIENT
Start: 2021-06-05 | End: 2021-06-08

## 2021-06-05 RX ORDER — ENOXAPARIN SODIUM 100 MG/ML
40 INJECTION SUBCUTANEOUS DAILY
Refills: 0 | Status: DISCONTINUED | OUTPATIENT
Start: 2021-06-05 | End: 2021-06-08

## 2021-06-05 RX ORDER — LEVOTHYROXINE SODIUM 125 MCG
125 TABLET ORAL DAILY
Refills: 0 | Status: DISCONTINUED | OUTPATIENT
Start: 2021-06-05 | End: 2021-06-08

## 2021-06-05 RX ORDER — LATANOPROST 0.05 MG/ML
1 SOLUTION/ DROPS OPHTHALMIC; TOPICAL AT BEDTIME
Refills: 0 | Status: DISCONTINUED | OUTPATIENT
Start: 2021-06-05 | End: 2021-06-08

## 2021-06-05 RX ORDER — ACETAMINOPHEN 500 MG
650 TABLET ORAL EVERY 6 HOURS
Refills: 0 | Status: DISCONTINUED | OUTPATIENT
Start: 2021-06-05 | End: 2021-06-08

## 2021-06-05 RX ORDER — PIPERACILLIN AND TAZOBACTAM 4; .5 G/20ML; G/20ML
3.38 INJECTION, POWDER, LYOPHILIZED, FOR SOLUTION INTRAVENOUS EVERY 8 HOURS
Refills: 0 | Status: DISCONTINUED | OUTPATIENT
Start: 2021-06-05 | End: 2021-06-07

## 2021-06-05 RX ORDER — VANCOMYCIN HCL 1 G
1500 VIAL (EA) INTRAVENOUS EVERY 24 HOURS
Refills: 0 | Status: DISCONTINUED | OUTPATIENT
Start: 2021-06-05 | End: 2021-06-06

## 2021-06-05 RX ADMIN — CITALOPRAM 10 MILLIGRAM(S): 10 TABLET, FILM COATED ORAL at 11:35

## 2021-06-05 RX ADMIN — SODIUM CHLORIDE 100 MILLILITER(S): 9 INJECTION INTRAMUSCULAR; INTRAVENOUS; SUBCUTANEOUS at 21:04

## 2021-06-05 RX ADMIN — PIPERACILLIN AND TAZOBACTAM 25 GRAM(S): 4; .5 INJECTION, POWDER, LYOPHILIZED, FOR SOLUTION INTRAVENOUS at 21:04

## 2021-06-05 RX ADMIN — CARBIDOPA AND LEVODOPA 1 TABLET(S): 25; 100 TABLET ORAL at 23:04

## 2021-06-05 RX ADMIN — PRAMIPEXOLE DIHYDROCHLORIDE 0.5 MILLIGRAM(S): 0.12 TABLET ORAL at 23:04

## 2021-06-05 RX ADMIN — Medication 300 MILLIGRAM(S): at 17:29

## 2021-06-05 RX ADMIN — Medication 650 MILLIGRAM(S): at 16:26

## 2021-06-05 RX ADMIN — CARBIDOPA AND LEVODOPA 1 TABLET(S): 25; 100 TABLET ORAL at 11:35

## 2021-06-05 RX ADMIN — Medication 650 MILLIGRAM(S): at 17:26

## 2021-06-05 RX ADMIN — ENOXAPARIN SODIUM 40 MILLIGRAM(S): 100 INJECTION SUBCUTANEOUS at 11:35

## 2021-06-05 RX ADMIN — CARBIDOPA AND LEVODOPA 1 TABLET(S): 25; 100 TABLET ORAL at 05:45

## 2021-06-05 RX ADMIN — PIPERACILLIN AND TAZOBACTAM 25 GRAM(S): 4; .5 INJECTION, POWDER, LYOPHILIZED, FOR SOLUTION INTRAVENOUS at 05:44

## 2021-06-05 RX ADMIN — LATANOPROST 1 DROP(S): 0.05 SOLUTION/ DROPS OPHTHALMIC; TOPICAL at 23:03

## 2021-06-05 RX ADMIN — PRAMIPEXOLE DIHYDROCHLORIDE 0.5 MILLIGRAM(S): 0.12 TABLET ORAL at 17:29

## 2021-06-05 RX ADMIN — Medication 650 MILLIGRAM(S): at 23:03

## 2021-06-05 RX ADMIN — SODIUM CHLORIDE 100 MILLILITER(S): 9 INJECTION INTRAMUSCULAR; INTRAVENOUS; SUBCUTANEOUS at 01:26

## 2021-06-05 RX ADMIN — PRAMIPEXOLE DIHYDROCHLORIDE 0.5 MILLIGRAM(S): 0.12 TABLET ORAL at 05:45

## 2021-06-05 RX ADMIN — EZETIMIBE 10 MILLIGRAM(S): 10 TABLET ORAL at 11:35

## 2021-06-05 RX ADMIN — Medication 125 MICROGRAM(S): at 05:45

## 2021-06-05 RX ADMIN — SODIUM CHLORIDE 100 MILLILITER(S): 9 INJECTION INTRAMUSCULAR; INTRAVENOUS; SUBCUTANEOUS at 14:20

## 2021-06-05 RX ADMIN — CARBIDOPA AND LEVODOPA 1 TABLET(S): 25; 100 TABLET ORAL at 17:29

## 2021-06-05 RX ADMIN — Medication 650 MILLIGRAM(S): at 01:26

## 2021-06-05 RX ADMIN — Medication 650 MILLIGRAM(S): at 02:26

## 2021-06-05 RX ADMIN — PRAMIPEXOLE DIHYDROCHLORIDE 0.5 MILLIGRAM(S): 0.12 TABLET ORAL at 11:35

## 2021-06-05 RX ADMIN — PIPERACILLIN AND TAZOBACTAM 25 GRAM(S): 4; .5 INJECTION, POWDER, LYOPHILIZED, FOR SOLUTION INTRAVENOUS at 12:48

## 2021-06-05 NOTE — PHYSICAL THERAPY INITIAL EVALUATION ADULT - ADDITIONAL COMMENTS
Pt. reports she owns rolling walker at home.     Pt. left seated in chair with all tubes/lines intact, call bell in reach and in NAD.

## 2021-06-05 NOTE — PHYSICAL THERAPY INITIAL EVALUATION ADULT - DISCHARGE DISPOSITION, PT EVAL
anticipated discharge to home with home PT services to address current functional limitations to optimize safety within the home environment with the use of a rolling walker./home w/ home PT
s/p '2016;  Coronary DEMARIO X2. currently on Plavix/ ASA  PLAN: Last dose Plavix is 8-6-18 as per cardiologist. Will remain on ASA for Coronary Stent protection.

## 2021-06-05 NOTE — PHYSICAL THERAPY INITIAL EVALUATION ADULT - PERTINENT HX OF CURRENT PROBLEM, REHAB EVAL
Patient is a 71 year old Female with PMH of Parkinsons, HTN, malignant bladder polyps s/p bladder resection and urostomy, HLD, hypothyroidism, now presenting to [facility] with Sepsis

## 2021-06-06 LAB
ANION GAP SERPL CALC-SCNC: 7 MMOL/L — SIGNIFICANT CHANGE UP (ref 7–14)
BASOPHILS # BLD AUTO: 0.04 K/UL — SIGNIFICANT CHANGE UP (ref 0–0.2)
BASOPHILS NFR BLD AUTO: 0.5 % — SIGNIFICANT CHANGE UP (ref 0–2)
BUN SERPL-MCNC: 19 MG/DL — SIGNIFICANT CHANGE UP (ref 7–23)
CALCIUM SERPL-MCNC: 8.7 MG/DL — SIGNIFICANT CHANGE UP (ref 8.4–10.5)
CHLORIDE SERPL-SCNC: 105 MMOL/L — SIGNIFICANT CHANGE UP (ref 98–107)
CO2 SERPL-SCNC: 24 MMOL/L — SIGNIFICANT CHANGE UP (ref 22–31)
CREAT SERPL-MCNC: 1.01 MG/DL — SIGNIFICANT CHANGE UP (ref 0.5–1.3)
EOSINOPHIL # BLD AUTO: 0.13 K/UL — SIGNIFICANT CHANGE UP (ref 0–0.5)
EOSINOPHIL NFR BLD AUTO: 1.7 % — SIGNIFICANT CHANGE UP (ref 0–6)
GLUCOSE SERPL-MCNC: 115 MG/DL — HIGH (ref 70–99)
HCT VFR BLD CALC: 31.8 % — LOW (ref 34.5–45)
HGB BLD-MCNC: 10 G/DL — LOW (ref 11.5–15.5)
IANC: 6.01 K/UL — SIGNIFICANT CHANGE UP (ref 1.5–8.5)
IMM GRANULOCYTES NFR BLD AUTO: 0.5 % — SIGNIFICANT CHANGE UP (ref 0–1.5)
LYMPHOCYTES # BLD AUTO: 0.75 K/UL — LOW (ref 1–3.3)
LYMPHOCYTES # BLD AUTO: 9.8 % — LOW (ref 13–44)
MAGNESIUM SERPL-MCNC: 2.1 MG/DL — SIGNIFICANT CHANGE UP (ref 1.6–2.6)
MCHC RBC-ENTMCNC: 31 PG — SIGNIFICANT CHANGE UP (ref 27–34)
MCHC RBC-ENTMCNC: 31.4 GM/DL — LOW (ref 32–36)
MCV RBC AUTO: 98.5 FL — SIGNIFICANT CHANGE UP (ref 80–100)
MONOCYTES # BLD AUTO: 0.72 K/UL — SIGNIFICANT CHANGE UP (ref 0–0.9)
MONOCYTES NFR BLD AUTO: 9.4 % — SIGNIFICANT CHANGE UP (ref 2–14)
NEUTROPHILS # BLD AUTO: 6.01 K/UL — SIGNIFICANT CHANGE UP (ref 1.8–7.4)
NEUTROPHILS NFR BLD AUTO: 78.1 % — HIGH (ref 43–77)
NRBC # BLD: 0 /100 WBCS — SIGNIFICANT CHANGE UP
NRBC # FLD: 0 K/UL — SIGNIFICANT CHANGE UP
PHOSPHATE SERPL-MCNC: 3 MG/DL — SIGNIFICANT CHANGE UP (ref 2.5–4.5)
PLATELET # BLD AUTO: 214 K/UL — SIGNIFICANT CHANGE UP (ref 150–400)
POTASSIUM SERPL-MCNC: 3.4 MMOL/L — LOW (ref 3.5–5.3)
POTASSIUM SERPL-SCNC: 3.4 MMOL/L — LOW (ref 3.5–5.3)
RBC # BLD: 3.23 M/UL — LOW (ref 3.8–5.2)
RBC # FLD: 15.4 % — HIGH (ref 10.3–14.5)
SODIUM SERPL-SCNC: 136 MMOL/L — SIGNIFICANT CHANGE UP (ref 135–145)
VANCOMYCIN TROUGH SERPL-MCNC: 6.9 UG/ML — LOW (ref 10–20)
WBC # BLD: 7.69 K/UL — SIGNIFICANT CHANGE UP (ref 3.8–10.5)
WBC # FLD AUTO: 7.69 K/UL — SIGNIFICANT CHANGE UP (ref 3.8–10.5)

## 2021-06-06 PROCEDURE — 99233 SBSQ HOSP IP/OBS HIGH 50: CPT

## 2021-06-06 RX ORDER — TRAMADOL HYDROCHLORIDE 50 MG/1
25 TABLET ORAL EVERY 8 HOURS
Refills: 0 | Status: DISCONTINUED | OUTPATIENT
Start: 2021-06-06 | End: 2021-06-08

## 2021-06-06 RX ORDER — VANCOMYCIN HCL 1 G
2000 VIAL (EA) INTRAVENOUS ONCE
Refills: 0 | Status: DISCONTINUED | OUTPATIENT
Start: 2021-06-06 | End: 2021-06-06

## 2021-06-06 RX ORDER — POTASSIUM CHLORIDE 20 MEQ
40 PACKET (EA) ORAL ONCE
Refills: 0 | Status: COMPLETED | OUTPATIENT
Start: 2021-06-06 | End: 2021-06-06

## 2021-06-06 RX ORDER — VANCOMYCIN HCL 1 G
1000 VIAL (EA) INTRAVENOUS EVERY 12 HOURS
Refills: 0 | Status: DISCONTINUED | OUTPATIENT
Start: 2021-06-06 | End: 2021-06-07

## 2021-06-06 RX ADMIN — CARBIDOPA AND LEVODOPA 1 TABLET(S): 25; 100 TABLET ORAL at 23:19

## 2021-06-06 RX ADMIN — TRAMADOL HYDROCHLORIDE 25 MILLIGRAM(S): 50 TABLET ORAL at 21:07

## 2021-06-06 RX ADMIN — CARBIDOPA AND LEVODOPA 1 TABLET(S): 25; 100 TABLET ORAL at 17:35

## 2021-06-06 RX ADMIN — PRAMIPEXOLE DIHYDROCHLORIDE 0.5 MILLIGRAM(S): 0.12 TABLET ORAL at 17:35

## 2021-06-06 RX ADMIN — CARBIDOPA AND LEVODOPA 1 TABLET(S): 25; 100 TABLET ORAL at 06:46

## 2021-06-06 RX ADMIN — Medication 40 MILLIEQUIVALENT(S): at 11:27

## 2021-06-06 RX ADMIN — PRAMIPEXOLE DIHYDROCHLORIDE 0.5 MILLIGRAM(S): 0.12 TABLET ORAL at 23:19

## 2021-06-06 RX ADMIN — CARBIDOPA AND LEVODOPA 1 TABLET(S): 25; 100 TABLET ORAL at 11:18

## 2021-06-06 RX ADMIN — Medication 250 MILLIGRAM(S): at 20:07

## 2021-06-06 RX ADMIN — PRAMIPEXOLE DIHYDROCHLORIDE 0.5 MILLIGRAM(S): 0.12 TABLET ORAL at 06:46

## 2021-06-06 RX ADMIN — TRAMADOL HYDROCHLORIDE 25 MILLIGRAM(S): 50 TABLET ORAL at 20:07

## 2021-06-06 RX ADMIN — Medication 650 MILLIGRAM(S): at 00:03

## 2021-06-06 RX ADMIN — Medication 650 MILLIGRAM(S): at 14:00

## 2021-06-06 RX ADMIN — EZETIMIBE 10 MILLIGRAM(S): 10 TABLET ORAL at 23:19

## 2021-06-06 RX ADMIN — PIPERACILLIN AND TAZOBACTAM 25 GRAM(S): 4; .5 INJECTION, POWDER, LYOPHILIZED, FOR SOLUTION INTRAVENOUS at 23:18

## 2021-06-06 RX ADMIN — PIPERACILLIN AND TAZOBACTAM 25 GRAM(S): 4; .5 INJECTION, POWDER, LYOPHILIZED, FOR SOLUTION INTRAVENOUS at 06:46

## 2021-06-06 RX ADMIN — ENOXAPARIN SODIUM 40 MILLIGRAM(S): 100 INJECTION SUBCUTANEOUS at 11:18

## 2021-06-06 RX ADMIN — PRAMIPEXOLE DIHYDROCHLORIDE 0.5 MILLIGRAM(S): 0.12 TABLET ORAL at 11:27

## 2021-06-06 RX ADMIN — Medication 650 MILLIGRAM(S): at 06:46

## 2021-06-06 RX ADMIN — Medication 650 MILLIGRAM(S): at 13:13

## 2021-06-06 RX ADMIN — Medication 125 MICROGRAM(S): at 06:46

## 2021-06-06 RX ADMIN — CITALOPRAM 10 MILLIGRAM(S): 10 TABLET, FILM COATED ORAL at 11:18

## 2021-06-06 RX ADMIN — LATANOPROST 1 DROP(S): 0.05 SOLUTION/ DROPS OPHTHALMIC; TOPICAL at 23:20

## 2021-06-06 RX ADMIN — Medication 650 MILLIGRAM(S): at 07:46

## 2021-06-06 RX ADMIN — PIPERACILLIN AND TAZOBACTAM 25 GRAM(S): 4; .5 INJECTION, POWDER, LYOPHILIZED, FOR SOLUTION INTRAVENOUS at 13:10

## 2021-06-07 LAB
ANION GAP SERPL CALC-SCNC: 11 MMOL/L — SIGNIFICANT CHANGE UP (ref 7–14)
BASOPHILS # BLD AUTO: 0.03 K/UL — SIGNIFICANT CHANGE UP (ref 0–0.2)
BASOPHILS NFR BLD AUTO: 0.4 % — SIGNIFICANT CHANGE UP (ref 0–2)
BUN SERPL-MCNC: 14 MG/DL — SIGNIFICANT CHANGE UP (ref 7–23)
CALCIUM SERPL-MCNC: 8.5 MG/DL — SIGNIFICANT CHANGE UP (ref 8.4–10.5)
CHLORIDE SERPL-SCNC: 106 MMOL/L — SIGNIFICANT CHANGE UP (ref 98–107)
CO2 SERPL-SCNC: 20 MMOL/L — LOW (ref 22–31)
CREAT SERPL-MCNC: 0.85 MG/DL — SIGNIFICANT CHANGE UP (ref 0.5–1.3)
EOSINOPHIL # BLD AUTO: 0.15 K/UL — SIGNIFICANT CHANGE UP (ref 0–0.5)
EOSINOPHIL NFR BLD AUTO: 2 % — SIGNIFICANT CHANGE UP (ref 0–6)
GLUCOSE SERPL-MCNC: 93 MG/DL — SIGNIFICANT CHANGE UP (ref 70–99)
HCT VFR BLD CALC: 31.7 % — LOW (ref 34.5–45)
HGB BLD-MCNC: 10 G/DL — LOW (ref 11.5–15.5)
IANC: 5.64 K/UL — SIGNIFICANT CHANGE UP (ref 1.5–8.5)
IMM GRANULOCYTES NFR BLD AUTO: 0.5 % — SIGNIFICANT CHANGE UP (ref 0–1.5)
LYMPHOCYTES # BLD AUTO: 0.86 K/UL — LOW (ref 1–3.3)
LYMPHOCYTES # BLD AUTO: 11.5 % — LOW (ref 13–44)
MAGNESIUM SERPL-MCNC: 2 MG/DL — SIGNIFICANT CHANGE UP (ref 1.6–2.6)
MCHC RBC-ENTMCNC: 30.9 PG — SIGNIFICANT CHANGE UP (ref 27–34)
MCHC RBC-ENTMCNC: 31.5 GM/DL — LOW (ref 32–36)
MCV RBC AUTO: 97.8 FL — SIGNIFICANT CHANGE UP (ref 80–100)
MONOCYTES # BLD AUTO: 0.75 K/UL — SIGNIFICANT CHANGE UP (ref 0–0.9)
MONOCYTES NFR BLD AUTO: 10 % — SIGNIFICANT CHANGE UP (ref 2–14)
NEUTROPHILS # BLD AUTO: 5.64 K/UL — SIGNIFICANT CHANGE UP (ref 1.8–7.4)
NEUTROPHILS NFR BLD AUTO: 75.6 % — SIGNIFICANT CHANGE UP (ref 43–77)
NRBC # BLD: 0 /100 WBCS — SIGNIFICANT CHANGE UP
NRBC # FLD: 0 K/UL — SIGNIFICANT CHANGE UP
PHOSPHATE SERPL-MCNC: 2.9 MG/DL — SIGNIFICANT CHANGE UP (ref 2.5–4.5)
PLATELET # BLD AUTO: 241 K/UL — SIGNIFICANT CHANGE UP (ref 150–400)
POTASSIUM SERPL-MCNC: 4.1 MMOL/L — SIGNIFICANT CHANGE UP (ref 3.5–5.3)
POTASSIUM SERPL-SCNC: 4.1 MMOL/L — SIGNIFICANT CHANGE UP (ref 3.5–5.3)
RBC # BLD: 3.24 M/UL — LOW (ref 3.8–5.2)
RBC # FLD: 15.3 % — HIGH (ref 10.3–14.5)
SODIUM SERPL-SCNC: 137 MMOL/L — SIGNIFICANT CHANGE UP (ref 135–145)
WBC # BLD: 7.47 K/UL — SIGNIFICANT CHANGE UP (ref 3.8–10.5)
WBC # FLD AUTO: 7.47 K/UL — SIGNIFICANT CHANGE UP (ref 3.8–10.5)

## 2021-06-07 PROCEDURE — 99232 SBSQ HOSP IP/OBS MODERATE 35: CPT

## 2021-06-07 RX ORDER — CEFAZOLIN SODIUM 1 G
1000 VIAL (EA) INJECTION EVERY 8 HOURS
Refills: 0 | Status: DISCONTINUED | OUTPATIENT
Start: 2021-06-07 | End: 2021-06-08

## 2021-06-07 RX ORDER — CEFAZOLIN SODIUM 1 G
1000 VIAL (EA) INJECTION ONCE
Refills: 0 | Status: COMPLETED | OUTPATIENT
Start: 2021-06-07 | End: 2021-06-07

## 2021-06-07 RX ORDER — CEFAZOLIN SODIUM 1 G
VIAL (EA) INJECTION
Refills: 0 | Status: DISCONTINUED | OUTPATIENT
Start: 2021-06-07 | End: 2021-06-08

## 2021-06-07 RX ORDER — BENZOCAINE AND MENTHOL 5; 1 G/100ML; G/100ML
1 LIQUID ORAL
Refills: 0 | Status: DISCONTINUED | OUTPATIENT
Start: 2021-06-07 | End: 2021-06-08

## 2021-06-07 RX ORDER — BENZOCAINE AND MENTHOL 5; 1 G/100ML; G/100ML
1 LIQUID ORAL ONCE
Refills: 0 | Status: COMPLETED | OUTPATIENT
Start: 2021-06-07 | End: 2021-06-07

## 2021-06-07 RX ORDER — SENNA PLUS 8.6 MG/1
2 TABLET ORAL AT BEDTIME
Refills: 0 | Status: DISCONTINUED | OUTPATIENT
Start: 2021-06-07 | End: 2021-06-08

## 2021-06-07 RX ADMIN — SODIUM CHLORIDE 100 MILLILITER(S): 9 INJECTION INTRAMUSCULAR; INTRAVENOUS; SUBCUTANEOUS at 06:51

## 2021-06-07 RX ADMIN — PIPERACILLIN AND TAZOBACTAM 25 GRAM(S): 4; .5 INJECTION, POWDER, LYOPHILIZED, FOR SOLUTION INTRAVENOUS at 08:40

## 2021-06-07 RX ADMIN — Medication 250 MILLIGRAM(S): at 06:51

## 2021-06-07 RX ADMIN — TRAMADOL HYDROCHLORIDE 25 MILLIGRAM(S): 50 TABLET ORAL at 05:27

## 2021-06-07 RX ADMIN — PRAMIPEXOLE DIHYDROCHLORIDE 0.5 MILLIGRAM(S): 0.12 TABLET ORAL at 22:15

## 2021-06-07 RX ADMIN — LATANOPROST 1 DROP(S): 0.05 SOLUTION/ DROPS OPHTHALMIC; TOPICAL at 22:13

## 2021-06-07 RX ADMIN — PIPERACILLIN AND TAZOBACTAM 25 GRAM(S): 4; .5 INJECTION, POWDER, LYOPHILIZED, FOR SOLUTION INTRAVENOUS at 13:40

## 2021-06-07 RX ADMIN — PRAMIPEXOLE DIHYDROCHLORIDE 0.5 MILLIGRAM(S): 0.12 TABLET ORAL at 17:02

## 2021-06-07 RX ADMIN — TRAMADOL HYDROCHLORIDE 25 MILLIGRAM(S): 50 TABLET ORAL at 04:27

## 2021-06-07 RX ADMIN — CARBIDOPA AND LEVODOPA 1 TABLET(S): 25; 100 TABLET ORAL at 17:03

## 2021-06-07 RX ADMIN — CITALOPRAM 10 MILLIGRAM(S): 10 TABLET, FILM COATED ORAL at 11:56

## 2021-06-07 RX ADMIN — PRAMIPEXOLE DIHYDROCHLORIDE 0.5 MILLIGRAM(S): 0.12 TABLET ORAL at 06:52

## 2021-06-07 RX ADMIN — Medication 125 MICROGRAM(S): at 06:53

## 2021-06-07 RX ADMIN — Medication 100 MILLIGRAM(S): at 22:19

## 2021-06-07 RX ADMIN — TRAMADOL HYDROCHLORIDE 25 MILLIGRAM(S): 50 TABLET ORAL at 13:30

## 2021-06-07 RX ADMIN — PRAMIPEXOLE DIHYDROCHLORIDE 0.5 MILLIGRAM(S): 0.12 TABLET ORAL at 11:56

## 2021-06-07 RX ADMIN — ENOXAPARIN SODIUM 40 MILLIGRAM(S): 100 INJECTION SUBCUTANEOUS at 11:57

## 2021-06-07 RX ADMIN — TRAMADOL HYDROCHLORIDE 25 MILLIGRAM(S): 50 TABLET ORAL at 22:11

## 2021-06-07 RX ADMIN — CARBIDOPA AND LEVODOPA 1 TABLET(S): 25; 100 TABLET ORAL at 11:56

## 2021-06-07 RX ADMIN — BENZOCAINE AND MENTHOL 1 LOZENGE: 5; 1 LIQUID ORAL at 01:33

## 2021-06-07 RX ADMIN — TRAMADOL HYDROCHLORIDE 25 MILLIGRAM(S): 50 TABLET ORAL at 12:39

## 2021-06-07 RX ADMIN — CARBIDOPA AND LEVODOPA 1 TABLET(S): 25; 100 TABLET ORAL at 06:53

## 2021-06-07 RX ADMIN — Medication 100 MILLIGRAM(S): at 16:23

## 2021-06-07 RX ADMIN — SENNA PLUS 2 TABLET(S): 8.6 TABLET ORAL at 22:55

## 2021-06-07 RX ADMIN — EZETIMIBE 10 MILLIGRAM(S): 10 TABLET ORAL at 22:13

## 2021-06-07 RX ADMIN — BENZOCAINE AND MENTHOL 1 LOZENGE: 5; 1 LIQUID ORAL at 16:24

## 2021-06-07 RX ADMIN — CARBIDOPA AND LEVODOPA 1 TABLET(S): 25; 100 TABLET ORAL at 22:14

## 2021-06-07 RX ADMIN — TRAMADOL HYDROCHLORIDE 25 MILLIGRAM(S): 50 TABLET ORAL at 23:11

## 2021-06-07 NOTE — CHART NOTE - NSCHARTNOTEFT_GEN_A_CORE
Vanco trough was 6.9, dosing Q24hrs.    A/P:  Vanco dosing changed to 1gm Q12hrs  Vanco trough before 4th dose ordered

## 2021-06-08 ENCOUNTER — TRANSCRIPTION ENCOUNTER (OUTPATIENT)
Age: 71
End: 2021-06-08

## 2021-06-08 VITALS
SYSTOLIC BLOOD PRESSURE: 121 MMHG | RESPIRATION RATE: 20 BRPM | DIASTOLIC BLOOD PRESSURE: 66 MMHG | TEMPERATURE: 98 F | OXYGEN SATURATION: 97 % | HEART RATE: 61 BPM

## 2021-06-08 LAB
ANION GAP SERPL CALC-SCNC: 12 MMOL/L — SIGNIFICANT CHANGE UP (ref 7–14)
BASOPHILS # BLD AUTO: 0.05 K/UL — SIGNIFICANT CHANGE UP (ref 0–0.2)
BASOPHILS NFR BLD AUTO: 0.6 % — SIGNIFICANT CHANGE UP (ref 0–2)
BUN SERPL-MCNC: 12 MG/DL — SIGNIFICANT CHANGE UP (ref 7–23)
CALCIUM SERPL-MCNC: 9.3 MG/DL — SIGNIFICANT CHANGE UP (ref 8.4–10.5)
CHLORIDE SERPL-SCNC: 103 MMOL/L — SIGNIFICANT CHANGE UP (ref 98–107)
CO2 SERPL-SCNC: 23 MMOL/L — SIGNIFICANT CHANGE UP (ref 22–31)
CREAT SERPL-MCNC: 0.83 MG/DL — SIGNIFICANT CHANGE UP (ref 0.5–1.3)
EOSINOPHIL # BLD AUTO: 0.16 K/UL — SIGNIFICANT CHANGE UP (ref 0–0.5)
EOSINOPHIL NFR BLD AUTO: 1.8 % — SIGNIFICANT CHANGE UP (ref 0–6)
GLUCOSE SERPL-MCNC: 99 MG/DL — SIGNIFICANT CHANGE UP (ref 70–99)
HCT VFR BLD CALC: 33 % — LOW (ref 34.5–45)
HGB BLD-MCNC: 10.2 G/DL — LOW (ref 11.5–15.5)
IANC: 6.63 K/UL — SIGNIFICANT CHANGE UP (ref 1.5–8.5)
IMM GRANULOCYTES NFR BLD AUTO: 0.8 % — SIGNIFICANT CHANGE UP (ref 0–1.5)
LYMPHOCYTES # BLD AUTO: 1 K/UL — SIGNIFICANT CHANGE UP (ref 1–3.3)
LYMPHOCYTES # BLD AUTO: 11.2 % — LOW (ref 13–44)
MAGNESIUM SERPL-MCNC: 2.1 MG/DL — SIGNIFICANT CHANGE UP (ref 1.6–2.6)
MCHC RBC-ENTMCNC: 30.5 PG — SIGNIFICANT CHANGE UP (ref 27–34)
MCHC RBC-ENTMCNC: 30.9 GM/DL — LOW (ref 32–36)
MCV RBC AUTO: 98.8 FL — SIGNIFICANT CHANGE UP (ref 80–100)
MONOCYTES # BLD AUTO: 1.02 K/UL — HIGH (ref 0–0.9)
MONOCYTES NFR BLD AUTO: 11.4 % — SIGNIFICANT CHANGE UP (ref 2–14)
NEUTROPHILS # BLD AUTO: 6.63 K/UL — SIGNIFICANT CHANGE UP (ref 1.8–7.4)
NEUTROPHILS NFR BLD AUTO: 74.2 % — SIGNIFICANT CHANGE UP (ref 43–77)
NRBC # BLD: 0 /100 WBCS — SIGNIFICANT CHANGE UP
NRBC # FLD: 0 K/UL — SIGNIFICANT CHANGE UP
PHOSPHATE SERPL-MCNC: 3.4 MG/DL — SIGNIFICANT CHANGE UP (ref 2.5–4.5)
PLATELET # BLD AUTO: 277 K/UL — SIGNIFICANT CHANGE UP (ref 150–400)
POTASSIUM SERPL-MCNC: 4 MMOL/L — SIGNIFICANT CHANGE UP (ref 3.5–5.3)
POTASSIUM SERPL-SCNC: 4 MMOL/L — SIGNIFICANT CHANGE UP (ref 3.5–5.3)
RBC # BLD: 3.34 M/UL — LOW (ref 3.8–5.2)
RBC # FLD: 15.1 % — HIGH (ref 10.3–14.5)
SODIUM SERPL-SCNC: 138 MMOL/L — SIGNIFICANT CHANGE UP (ref 135–145)
VANCOMYCIN TROUGH SERPL-MCNC: 6.7 UG/ML — LOW (ref 10–20)
WBC # BLD: 8.93 K/UL — SIGNIFICANT CHANGE UP (ref 3.8–10.5)
WBC # FLD AUTO: 8.93 K/UL — SIGNIFICANT CHANGE UP (ref 3.8–10.5)

## 2021-06-08 PROCEDURE — 99239 HOSP IP/OBS DSCHRG MGMT >30: CPT

## 2021-06-08 RX ORDER — BENZOCAINE AND MENTHOL 5; 1 G/100ML; G/100ML
1 LIQUID ORAL
Qty: 0 | Refills: 0 | DISCHARGE
Start: 2021-06-08

## 2021-06-08 RX ORDER — LATANOPROST 0.05 MG/ML
1 SOLUTION/ DROPS OPHTHALMIC; TOPICAL
Qty: 0 | Refills: 0 | DISCHARGE
Start: 2021-06-08

## 2021-06-08 RX ORDER — CEPHALEXIN 500 MG
1 CAPSULE ORAL
Qty: 20 | Refills: 0
Start: 2021-06-08 | End: 2021-06-12

## 2021-06-08 RX ORDER — ACETAMINOPHEN 500 MG
2 TABLET ORAL
Qty: 0 | Refills: 0 | DISCHARGE
Start: 2021-06-08

## 2021-06-08 RX ORDER — TRAMADOL HYDROCHLORIDE 50 MG/1
0.5 TABLET ORAL
Qty: 4.5 | Refills: 0
Start: 2021-06-08 | End: 2021-06-10

## 2021-06-08 RX ORDER — TRIAMTERENE/HYDROCHLOROTHIAZID 75 MG-50MG
1 TABLET ORAL
Qty: 0 | Refills: 0 | DISCHARGE

## 2021-06-08 RX ORDER — SENNA PLUS 8.6 MG/1
2 TABLET ORAL
Qty: 60 | Refills: 0
Start: 2021-06-08 | End: 2021-07-07

## 2021-06-08 RX ADMIN — TRAMADOL HYDROCHLORIDE 25 MILLIGRAM(S): 50 TABLET ORAL at 06:22

## 2021-06-08 RX ADMIN — ENOXAPARIN SODIUM 40 MILLIGRAM(S): 100 INJECTION SUBCUTANEOUS at 11:06

## 2021-06-08 RX ADMIN — TRAMADOL HYDROCHLORIDE 25 MILLIGRAM(S): 50 TABLET ORAL at 07:22

## 2021-06-08 RX ADMIN — PRAMIPEXOLE DIHYDROCHLORIDE 0.5 MILLIGRAM(S): 0.12 TABLET ORAL at 06:24

## 2021-06-08 RX ADMIN — BENZOCAINE AND MENTHOL 1 LOZENGE: 5; 1 LIQUID ORAL at 02:54

## 2021-06-08 RX ADMIN — Medication 100 MILLIGRAM(S): at 06:22

## 2021-06-08 RX ADMIN — Medication 125 MICROGRAM(S): at 06:24

## 2021-06-08 RX ADMIN — CARBIDOPA AND LEVODOPA 1 TABLET(S): 25; 100 TABLET ORAL at 11:05

## 2021-06-08 RX ADMIN — PRAMIPEXOLE DIHYDROCHLORIDE 0.5 MILLIGRAM(S): 0.12 TABLET ORAL at 11:06

## 2021-06-08 RX ADMIN — Medication 100 MILLIGRAM(S): at 13:40

## 2021-06-08 RX ADMIN — CITALOPRAM 10 MILLIGRAM(S): 10 TABLET, FILM COATED ORAL at 11:05

## 2021-06-08 RX ADMIN — CARBIDOPA AND LEVODOPA 1 TABLET(S): 25; 100 TABLET ORAL at 06:24

## 2021-06-08 NOTE — DISCHARGE NOTE PROVIDER - NSDCCPCAREPLAN_GEN_ALL_CORE_FT
PRINCIPAL DISCHARGE DIAGNOSIS  Diagnosis: Cellulitis  Assessment and Plan of Treatment: Complete antibiotic therapy as directed.  Monitor for any further signs and symptoms of further infection including but not limited to increased reddness/discharge from leg, fevers, rigors, chills and or difficulty breathing.      SECONDARY DISCHARGE DIAGNOSES  Diagnosis: Hypertension  Assessment and Plan of Treatment: Your home blood pressure medication was discontinued in the hospital and your blood pressure remained within normal range. Please follow up with your primary care doctor for further management and to determine if/when to resume your blood pressure medication within 1 week of discharge from hospital. Monitor for any visual changes, headaches or dizziness.  Monitor blood pressure regularly.        Diagnosis: Hypothyroidism  Assessment and Plan of Treatment: Continue synthroid    Diagnosis: Parkinsons disease  Assessment and Plan of Treatment: Continue medications as prescribed- outpatient follow up with your neurologist per routine

## 2021-06-08 NOTE — PROGRESS NOTE ADULT - PROBLEM SELECTOR PLAN 5
On home Zetia 10mg daily  -c/w Zetia

## 2021-06-08 NOTE — PROGRESS NOTE ADULT - PROBLEM/PLAN-4
Pt informed of negative results.  No questions at this time.
DISPLAY PLAN FREE TEXT

## 2021-06-08 NOTE — PROGRESS NOTE ADULT - PROBLEM SELECTOR PLAN 1
On admission, patient met sepsis criteria with fever, leukocytosis, tachycardia, with likely infectious source of non-purulent left lower extremity cellulitis   - UA positive, but less reliable in the setting of urostomy, urine cx mixed shaq  - s/p zosyn x1, Vanc x1, NS 1.6L bolus x1 in ED  - c/w empiric Vancomycin and Zosyn in the setting of high rectal temperature, leukocytosis, extensive cellulitis  - c/w maintenance IVF: NS 1L @ 100cc/hr  - Tylenol for pain control and fever PRN  - erythema marked with pen, monitor for response to treatment-improving  - f/u blood cultures x2
On admission, patient met sepsis criteria with fever, leukocytosis, tachycardia, with likely infectious source of non-purulent left lower extremity cellulitis vs. less likely Urostomy infection.  - UA positive, but less reliable in the setting of urostomy, pending urine culture  - s/p zosyn x1, Vanc x1, NS 1.6L bolus x1 in ED  - c/w empiric Vancomycin and Zosyn in the setting of high rectal temperature, leukocytosis, extensive cellulitis, and possible urostomy infection   - c/w maintenance IVF: NS 1L @ 100cc/hr  - Tylenol for pain control and fever PRN  - erythema marked with pen, monitor for response to treatment  - monitor fever curve, vitals q4h  - f/u blood cultures x2, urine culture
On admission, patient met sepsis criteria with fever, leukocytosis, tachycardia, with likely infectious source of non-purulent left lower extremity cellulitis   - UA positive, but less reliable in the setting of urostomy, urine cx mixed shaq  - s/p zosyn x1, Vanc x1, NS 1.6L bolus x1 in ED  - previously on vanc/zosyn, will switch to Ancef given non-purulent cellulitis  - Tylenol for pain control and fever PRN  - erythema marked with pen, monitor for response to treatment-improving  - blood cx negative
On admission, patient met sepsis criteria with fever, leukocytosis, tachycardia, with likely infectious source of non-purulent left lower extremity cellulitis   - UA positive, but less reliable in the setting of urostomy, urine cx mixed shaq  - s/p zosyn x1, Vanc x1, NS 1.6L bolus x1 in ED  - previously on vanc/zosyn, will switch to Ancef given non-purulent cellulitis  - Tylenol for pain control and fever PRN  - erythema marked with pen, monitor for response to treatment-improving  - blood cx negative

## 2021-06-08 NOTE — PROGRESS NOTE ADULT - ASSESSMENT
Patient is a 71yoF with PMH of Parkinsons, HTN, malignant bladder polyps s/p bladder resection and urostomy, HLD, hypothyroidism, now presenting to the ED with sepsis likely secondary to nonpurulent left lower extremity cellulitis vs. urostomy infection.
Patient is a 71yoF with PMH of Parkinsons, HTN, malignant bladder polyps s/p bladder resection and urostomy, HLD, hypothyroidism, now presenting to the ED with sepsis likely secondary to nonpurulent left lower extremity cellulitis vs. (less likely) urostomy infection.
Patient is a 71yoF with PMH of Parkinsons, HTN, malignant bladder polyps s/p bladder resection and urostomy, HLD, hypothyroidism, now presenting to the ED with sepsis likely secondary to nonpurulent left lower extremity cellulitis vs. urostomy infection.
Patient is a 71yoF with PMH of Parkinsons, HTN, malignant bladder polyps s/p bladder resection and urostomy, HLD, hypothyroidism, now presenting to the ED with sepsis likely secondary to nonpurulent left lower extremity cellulitis vs. urostomy infection.

## 2021-06-08 NOTE — DISCHARGE NOTE NURSING/CASE MANAGEMENT/SOCIAL WORK - PATIENT PORTAL LINK FT
You can access the FollowMyHealth Patient Portal offered by [facility] by registering at the following website: [email or web address]. By joining [facility]’s FollowMyHealth portal, you will also be able to view your health information using other applications (apps) compatible with our system.

## 2021-06-08 NOTE — PROGRESS NOTE ADULT - PROBLEM SELECTOR PLAN 4
On home levothyroxine 125mg daily  -c/w Levothyroxine

## 2021-06-08 NOTE — PROGRESS NOTE ADULT - PROBLEM SELECTOR PLAN 7
DVT ppx: Lovenox 40 SQ daily  Diet: regular

## 2021-06-08 NOTE — PROGRESS NOTE ADULT - NSPROGADDITIONALINFOA_GEN_ALL_CORE
Patient is medically stable for discharge. A total of 38 minutes were spent in discharge coordination.

## 2021-06-08 NOTE — PROGRESS NOTE ADULT - PROBLEM SELECTOR PLAN 2
Non-purulent left lower extremity cellulitis. Erythema with poorly defined borders, warmth and tenderness from proximal left dorsal foot to upper shin, marked by pen. No abscess or purulence appreciated.  -antibiotics as above  -PT consult given recent difficulty with weight bearing and ambulation
Non-purulent left lower extremity cellulitis. Erythema with poorly defined borders, warmth and tenderness from proximal left dorsal foot to upper shin, marked by pen. No abscess or purulence appreciated.  -Currently on Ancef, will switch to Keflex for 5 additional days upon discharge   -PT consult given recent difficulty with weight bearing and ambulation-home with home PT
Non-purulent left lower extremity cellulitis. Erythema with poorly defined borders, warmth and tenderness from proximal left dorsal foot to upper shin, marked by pen. No abscess or purulence appreciated.  -antibiotics as above  -PT consult given recent difficulty with weight bearing and ambulation-home with home PT
Non-purulent left lower extremity cellulitis. Erythema with poorly defined borders, warmth and tenderness from proximal left dorsal foot to upper shin, marked by pen. No abscess or purulence appreciated.  -antibiotics as above  -PT consult given recent difficulty with weight bearing and ambulation-home with home PT

## 2021-06-08 NOTE — DISCHARGE NOTE PROVIDER - HOSPITAL COURSE
Patient is a 71yoF with PMH of Parkinsons, HTN, malignant bladder polyps s/p bladder resection and urostomy, HLD, hypothyroidism, now presenting to the ED with sepsis likely secondary to nonpurulent left lower extremity cellulitis. Blood cultures negative, urine culture mixed shaq. Pt is s/p IV vanc/zosyn. Transitioned to IV Ancef on 6/7, pt improving clinically and will be transitioned to po keflex 500mg q6H x 5 days upon discharge. Physical therapy was consulted and recommended home PT. Per CM, outpatient PT set up upon d/c. Home blood pressure medication held 2/2 to infection, BP WNL without any medications- advised to follow up with PCP regarding resumption of home triamterene-hctz.       On 6/8/2021, discussed with Dr. Reid, patient is medically cleared and optimized for discharge today. d/c meds reviewed with attending. ISTOP: [id number]

## 2021-06-08 NOTE — DISCHARGE NOTE PROVIDER - NSDCMRMEDTOKEN_GEN_ALL_CORE_FT
acetaminophen 325 mg oral tablet: 2 tab(s) orally every 6 hours, As needed, Temp greater or equal to 38C (100.4F), Mild Pain (1 - 3), Moderate Pain (4 - 6)  carbidopa-levodopa 25 mg-100 mg oral tablet: 1 tab(s) orally 4 times a day  citalopram 10 mg oral tablet: 1 tab(s) orally once a day  Keflex 500 mg oral capsule: 1 cap(s) orally 4 times a day x 5 days  latanoprost 0.005% ophthalmic solution: 1 drop(s) to each affected eye once a day (at bedtime)  levothyroxine 125 mcg (0.125 mg) oral tablet: 1 tab(s) orally once a day  menthol-benzocaine 3.6 mg-15 mg mucous membrane lozenge: 1 lozenge mucous membrane 3 times a day, As Needed for sore throat  outpatient physical therapy: 3 times per week or as often as insurance allows  pramipexole 0.25 mg oral tablet: 2 tab(s) orally 4 times a day  senna oral tablet: 2 tab(s) orally once a day (at bedtime), As Needed for constipation  traMADol 50 mg oral tablet: 0.5 tab(s) (25mg) orally every 8 hours, As needed, Severe Pain (7 - 10) MDD:1.5 tab  Zetia 10 mg oral tablet: 1 tab(s) orally once a day

## 2021-06-08 NOTE — PROGRESS NOTE ADULT - PROBLEM SELECTOR PLAN 6
essential hypertension  On home HCTZ/Triamterene 37.5/25 daily  -hold now given hypotension in the setting of active infection, BP stable
essential hypertension  On home HCTZ/Triamterene 37.5/25 daily  -hold now given hypotension in the setting of active infection

## 2021-06-08 NOTE — DISCHARGE NOTE PROVIDER - CARE PROVIDER_API CALL
Nicholas Stevens  INTERNAL MEDICINE  [address] [place] [address] 1  [place] [address]  Phone: [phone]  Fax: [phone]  Follow Up Time: 1 week

## 2021-06-08 NOTE — PROGRESS NOTE ADULT - PROBLEM SELECTOR PROBLEM 7
Preventive measure
Current every day smoker

## 2021-06-08 NOTE — PROGRESS NOTE ADULT - SUBJECTIVE AND OBJECTIVE BOX
PROGRESS NOTE:   Authored by Dr. Zaina Reid MD, pager [phone]     Patient is a 71y old  Female who presents with a chief complaint of sepsis (06 Jun 2021 14:35)      SUBJECTIVE / OVERNIGHT EVENTS:    ADDITIONAL REVIEW OF SYSTEMS:    MEDICATIONS  (STANDING):  carbidopa/levodopa  25/100 1 Tablet(s) Oral four times a day  ceFAZolin   IVPB 1000 milliGRAM(s) IV Intermittent once  ceFAZolin   IVPB 1000 milliGRAM(s) IV Intermittent every 8 hours  ceFAZolin   IVPB      citalopram 10 milliGRAM(s) Oral daily  enoxaparin Injectable 40 milliGRAM(s) SubCutaneous daily  ezetimibe 10 milliGRAM(s) Oral daily  latanoprost 0.005% Ophthalmic Solution 1 Drop(s) Both EYES at bedtime  levothyroxine 125 MICROGram(s) Oral daily  pramipexole 0.5 milliGRAM(s) Oral four times a day    MEDICATIONS  (PRN):  acetaminophen   Tablet .. 650 milliGRAM(s) Oral every 6 hours PRN Temp greater or equal to 38C (100.4F), Mild Pain (1 - 3), Moderate Pain (4 - 6)  traMADol 25 milliGRAM(s) Oral every 8 hours PRN Severe Pain (7 - 10)      CAPILLARY BLOOD GLUCOSE        I&O's Summary    06 Jun 2021 07:01  -  07 Jun 2021 07:00  --------------------------------------------------------  IN: 1408 mL / OUT: 2375 mL / NET: -967 mL        PHYSICAL EXAM:  Vital Signs Last 24 Hrs  T(C): 36.7 (07 Jun 2021 13:22), Max: 37.4 (06 Jun 2021 21:56)  T(F): 98.1 (07 Jun 2021 13:22), Max: 99.3 (06 Jun 2021 21:56)  HR: 86 (07 Jun 2021 13:22) (71 - 88)  BP: 151/71 (07 Jun 2021 13:22) (120/64 - 151/71)  BP(mean): --  RR: 18 (07 Jun 2021 06:50) (17 - 18)  SpO2: 100% (07 Jun 2021 13:22) (96% - 100%)    CONSTITUTIONAL: NAD, well-developed  RESPIRATORY: Normal respiratory effort; lungs are clear to auscultation bilaterally  CARDIOVASCULAR: Regular rate and rhythm, normal S1 and S2, no murmur/rub/gallop; No lower extremity edema; Peripheral pulses are 2+ bilaterally  ABDOMEN: Nontender to palpation, normoactive bowel sounds, no rebound/guarding; No hepatosplenomegaly  MUSCULOSKELETAL: no clubbing or cyanosis of digits; no joint swelling or tenderness to palpation  : Urostomy bag in place  PSYCH: A+O to person, place, and time; affect appropriate  SKIN: Minimal erythema noted within area demarcated by marker on LLE, still with tenderness and increased swelling of L vs R LE    LABS:                        10.0   7.47  )-----------( 241      ( 07 Jun 2021 07:05 )             31.7     06-07    137  |  106  |  14  ----------------------------<  93  4.1   |  20<L>  |  0.85    Ca    8.5      07 Jun 2021 07:05  Phos  2.9     06-07  Mg     2.0     06-07                Culture - Blood (collected 04 Jun 2021 18:13)  Source: .Blood Blood-Peripheral  Preliminary Report (05 Jun 2021 19:01):    No growth to date.    Culture - Blood (collected 04 Jun 2021 18:13)  Source: .Blood Blood-Peripheral  Preliminary Report (05 Jun 2021 19:01):    No growth to date.        RADIOLOGY & ADDITIONAL TESTS:  Results Reviewed:   Imaging Personally Reviewed:  Electrocardiogram Personally Reviewed:    COORDINATION OF CARE:  Care Discussed with Consultants/Other Providers [Y/N]:  Prior or Outpatient Records Reviewed [Y/N]:  
[facility] Division of Hospital Medicine  Claus Yu MD  Pager [phone]    Patient is a 71y old  Female who presents with a chief complaint of sepsis (2021 16:43)      SUBJECTIVE / OVERNIGHT EVENTS: Still with pain in LLE      MEDICATIONS  (STANDING):  carbidopa/levodopa  25/100 1 Tablet(s) Oral four times a day  citalopram 10 milliGRAM(s) Oral daily  enoxaparin Injectable 40 milliGRAM(s) SubCutaneous daily  ezetimibe 10 milliGRAM(s) Oral daily  latanoprost 0.005% Ophthalmic Solution 1 Drop(s) Both EYES at bedtime  levothyroxine 125 MICROGram(s) Oral daily  piperacillin/tazobactam IVPB.. 3.375 Gram(s) IV Intermittent every 8 hours  pramipexole 0.5 milliGRAM(s) Oral four times a day  sodium chloride 0.9%. 1000 milliLiter(s) (100 mL/Hr) IV Continuous <Continuous>  vancomycin  IVPB 1500 milliGRAM(s) IV Intermittent every 24 hours    MEDICATIONS  (PRN):  acetaminophen   Tablet .. 650 milliGRAM(s) Oral every 6 hours PRN Temp greater or equal to 38C (100.4F), Mild Pain (1 - 3), Moderate Pain (4 - 6)      CAPILLARY BLOOD GLUCOSE        I&O's Summary    2021 07:  -  2021 07:00  --------------------------------------------------------  IN: 0 mL / OUT: 800 mL / NET: -800 mL    2021 07:01  -  2021 14:37  --------------------------------------------------------  IN: 936 mL / OUT: 300 mL / NET: 636 mL        PHYSICAL EXAM:  Vital Signs Last 24 Hrs  T(C): 36.9 (2021 13:56), Max: 37 (2021 22:55)  T(F): 98.4 (2021 13:56), Max: 98.6 (2021 22:55)  HR: 97 (2021 13:56) (74 - 97)  BP: 131/68 (2021 13:56) (100/51 - 131/68)  BP(mean): --  RR: 18 (2021 06:45) (18 - 18)  SpO2: 99% (2021 13:56) (97% - 100%)  CONSTITUTIONAL: NAD  EYES: EOMI, conjunctiva and sclera clear  ENMT: Moist oral mucosa  NECK: Supple  RESPIRATORY: Breathing unlabored, CTAB  CARDIOVASCULAR: S1S2 no MRG  ABDOMEN: Nontender to palpation, normoactive bowel sounds, no rebound/guarding  MUSCULOSKELETAL: no clubbing or cyanosis of digits, surgical scars BL knees  NEUROLOGY: No focal deficits   SKIN: erythema LLE, improving slowly    LABS:                        10.0   7.69  )-----------( 214      ( 2021 06:57 )             31.8     06-06    136  |  105  |  19  ----------------------------<  115<H>  3.4<L>   |  24  |  1.01    Ca    8.7      2021 06:57  Phos  3.0     06-06  Mg     2.1     06-06    TPro  6.9  /  Alb  3.4  /  TBili  0.5  /  DBili  x   /  AST  12  /  ALT  <5<L>  /  AlkPhos  64  06-05          Urinalysis Basic - ( 2021 15:21 )    Color: Light Yellow / Appearance: Clear / S.015 / pH: x  Gluc: x / Ketone: Negative  / Bili: Negative / Urobili: <2 mg/dL   Blood: x / Protein: 30 mg/dL / Nitrite: Positive   Leuk Esterase: Small / RBC: 6 /HPF / WBC 14 /HPF   Sq Epi: x / Non Sq Epi: 10 /HPF / Bacteria: Moderate        Culture - Blood (collected 2021 18:13)  Source: .Blood Blood-Peripheral  Preliminary Report (2021 19:01):    No growth to date.    Culture - Blood (collected 2021 18:13)  Source: .Blood Blood-Peripheral  Preliminary Report (2021 19:01):    No growth to date.    Culture - Urine (collected 2021 15:22)  Source: .Urine Clean Catch (Midstream)  Final Report (2021 14:49):    >=3 organisms. Probable collection contamination.      CODE: FULL  
[facility] Division of Hospital Medicine  Claus Yu MD  Pager [phone]    Patient is a 71y old  Female who presents with a chief complaint of sepsis (2021 22:58)      SUBJECTIVE / OVERNIGHT EVENTS: Leg painful but getting better      MEDICATIONS  (STANDING):  carbidopa/levodopa  25/100 1 Tablet(s) Oral four times a day  citalopram 10 milliGRAM(s) Oral daily  enoxaparin Injectable 40 milliGRAM(s) SubCutaneous daily  ezetimibe 10 milliGRAM(s) Oral daily  latanoprost 0.005% Ophthalmic Solution 1 Drop(s) Both EYES at bedtime  levothyroxine 125 MICROGram(s) Oral daily  piperacillin/tazobactam IVPB.. 3.375 Gram(s) IV Intermittent every 8 hours  pramipexole 0.5 milliGRAM(s) Oral four times a day  sodium chloride 0.9%. 1000 milliLiter(s) (100 mL/Hr) IV Continuous <Continuous>  vancomycin  IVPB 1500 milliGRAM(s) IV Intermittent every 24 hours    MEDICATIONS  (PRN):  acetaminophen   Tablet .. 650 milliGRAM(s) Oral every 6 hours PRN Temp greater or equal to 38C (100.4F), Mild Pain (1 - 3), Moderate Pain (4 - 6)      CAPILLARY BLOOD GLUCOSE        I&O's Summary    2021 07:01  -  2021 07:00  --------------------------------------------------------  IN: 0 mL / OUT: 400 mL / NET: -400 mL        PHYSICAL EXAM:  Vital Signs Last 24 Hrs  T(C): 37.1 (2021 13:06), Max: 37.1 (2021 09:40)  T(F): 98.7 (2021 13:06), Max: 98.8 (2021 09:40)  HR: 89 (2021 13:06) (81 - 97)  BP: 111/52 (2021 13:06) (107/51 - 115/58)  BP(mean): --  RR: 18 (2021 09:40) (17 - 18)  SpO2: 97% (2021 13:06) (96% - 100%)  CONSTITUTIONAL: NAD  EYES: EOMI, conjunctiva and sclera clear  ENMT: Moist oral mucosa  NECK: Supple  RESPIRATORY: Breathing unlabored, CTAB  CARDIOVASCULAR: S1S2 no MRG  ABDOMEN: Nontender to palpation, normoactive bowel sounds, no rebound/guarding  MUSCULOSKELETAL: surgical scars BL knees  NEUROLOGY: No focal deficits   SKIN:erythema and warmth LLE improved not as extensive as boundary lines drawn)    LABS:                        10.5   9.71  )-----------( 192      ( 2021 07:34 )             33.5     06-05    139  |  103  |  21  ----------------------------<  104<H>  3.5   |  23  |  0.92    Ca    9.1      2021 07:34  Phos  2.5     06-05  Mg     2.2     06-05    TPro  6.9  /  Alb  3.4  /  TBili  0.5  /  DBili  x   /  AST  12  /  ALT  <5<L>  /  AlkPhos  64  06-05          Urinalysis Basic - ( 2021 15:21 )    Color: Light Yellow / Appearance: Clear / S.015 / pH: x  Gluc: x / Ketone: Negative  / Bili: Negative / Urobili: <2 mg/dL   Blood: x / Protein: 30 mg/dL / Nitrite: Positive   Leuk Esterase: Small / RBC: 6 /HPF / WBC 14 /HPF   Sq Epi: x / Non Sq Epi: 10 /HPF / Bacteria: Moderate        Culture - Urine (collected 2021 15:22)  Source: .Urine Clean Catch (Midstream)  Final Report (2021 14:49):    >=3 organisms. Probable collection contamination.        CODE: FULL  
PROGRESS NOTE:   Authored by Dr. Zaina Reid MD, pager [phone]     Patient is a 71y old  Female who presents with a chief complaint of sepsis (07 Jun 2021 16:01)      SUBJECTIVE / OVERNIGHT EVENTS:  Patient feels her LLE pain and swelling has improved. She is looking forward to being discharged home today.     ADDITIONAL REVIEW OF SYSTEMS:    CONSTITUTIONAL: No weakness, fevers or chills  EYES/ENT: No visual changes;  No vertigo or throat pain   NECK: No pain or stiffness  RESPIRATORY: No cough, wheezing, hemoptysis; No shortness of breath  CARDIOVASCULAR: No chest pain or palpitations  GASTROINTESTINAL: No abdominal or epigastric pain. No nausea, vomiting, or hematemesis; No diarrhea or constipation. No melena or hematochezia.  GENITOURINARY: No dysuria, frequency or hematuria  NEUROLOGICAL: No numbness or weakness  PSYCH: No A/V hallucinations  MSK: No joint pain  SKIN: No itching, rashes    MEDICATIONS  (STANDING):  carbidopa/levodopa  25/100 1 Tablet(s) Oral four times a day  ceFAZolin   IVPB 1000 milliGRAM(s) IV Intermittent every 8 hours  ceFAZolin   IVPB      citalopram 10 milliGRAM(s) Oral daily  enoxaparin Injectable 40 milliGRAM(s) SubCutaneous daily  ezetimibe 10 milliGRAM(s) Oral daily  latanoprost 0.005% Ophthalmic Solution 1 Drop(s) Both EYES at bedtime  levothyroxine 125 MICROGram(s) Oral daily  pramipexole 0.5 milliGRAM(s) Oral four times a day  senna 2 Tablet(s) Oral at bedtime    MEDICATIONS  (PRN):  acetaminophen   Tablet .. 650 milliGRAM(s) Oral every 6 hours PRN Temp greater or equal to 38C (100.4F), Mild Pain (1 - 3), Moderate Pain (4 - 6)  benzocaine 15 mG/menthol 3.6 mG (Sugar-Free) Lozenge 1 Lozenge Oral four times a day PRN Sore Throat  traMADol 25 milliGRAM(s) Oral every 8 hours PRN Severe Pain (7 - 10)      CAPILLARY BLOOD GLUCOSE        I&O's Summary    07 Jun 2021 07:01  -  08 Jun 2021 07:00  --------------------------------------------------------  IN: 240 mL / OUT: 850 mL / NET: -610 mL    08 Jun 2021 07:01  -  08 Jun 2021 15:05  --------------------------------------------------------  IN: 295 mL / OUT: 0 mL / NET: 295 mL        PHYSICAL EXAM:  Vital Signs Last 24 Hrs  T(C): 36.8 (08 Jun 2021 13:05), Max: 37.2 (07 Jun 2021 21:31)  T(F): 98.3 (08 Jun 2021 13:05), Max: 98.9 (07 Jun 2021 21:31)  HR: 61 (08 Jun 2021 13:05) (61 - 89)  BP: 121/66 (08 Jun 2021 13:05) (107/54 - 146/79)  BP(mean): --  RR: 20 (08 Jun 2021 13:05) (17 - 20)  SpO2: 97% (08 Jun 2021 13:05) (97% - 100%)    CONSTITUTIONAL: NAD, well-developed  RESPIRATORY: Normal respiratory effort; lungs are clear to auscultation bilaterally  CARDIOVASCULAR: Regular rate and rhythm, normal S1 and S2, no murmur/rub/gallop; No lower extremity edema; Peripheral pulses are 2+ bilaterally  ABDOMEN: Nontender to palpation, normoactive bowel sounds, no rebound/guarding; No hepatosplenomegaly  MUSCULOSKELETAL: no clubbing or cyanosis of digits; no joint swelling or tenderness to palpation  : Urostomy bag in place  PSYCH: A+O to person, place, and time; affect appropriate  SKIN: Minimal erythema noted within area demarcated by marker on LLE, still with tenderness and increased swelling of L vs R LE    LABS:                        10.2   8.93  )-----------( 277      ( 08 Jun 2021 07:16 )             33.0     06-08    138  |  103  |  12  ----------------------------<  99  4.0   |  23  |  0.83    Ca    9.3      08 Jun 2021 07:16  Phos  3.4     06-08  Mg     2.1     06-08                  RADIOLOGY & ADDITIONAL TESTS:  Results Reviewed:   Imaging Personally Reviewed:  Electrocardiogram Personally Reviewed:    COORDINATION OF CARE:  Care Discussed with Consultants/Other Providers [Y/N]:  Prior or Outpatient Records Reviewed [Y/N]:

## 2021-06-09 LAB
CULTURE RESULTS: SIGNIFICANT CHANGE UP
CULTURE RESULTS: SIGNIFICANT CHANGE UP
SPECIMEN SOURCE: SIGNIFICANT CHANGE UP
SPECIMEN SOURCE: SIGNIFICANT CHANGE UP

## 2021-06-14 NOTE — PRE-OP CHECKLIST - VIA
Subjective:      Ava Garcia is a 22 y.o. female with chief complaint of diabetes follow-up and STD check.    1.  Diabetes follow-up.  Last visit was in November.  She is taking Metformin 1000 mg twice a day as directed.  She is not checking her sugars.  We have attempted to have her follow-up with diabetic educator but have not been successful in that.  She did have an eye exam at [facility] on approximately 1/15/2021.  She did get her flu shot this year.  No history of thyroid issues or pancreatitis.  She just got new glasses.    2.  STD screen.  History of recurrent frequent trichomonas infections, along with chlamydia at times.  Last check in November was all negative.  Patient is not having any symptoms today, but would like to be checked again.    Has Nexplanon for contraception.  She is now getting a period every month, lasting about 7 days, which is a little bit longer than her periods would normally last.  She may be interested in an IUD.      Patient Active Problem List   Diagnosis     Type 2 diabetes mellitus (H)     Vitamin D deficiency     Morbid obesity (H)     Nexplanon insertion (7/16/19)     Trichomonas infection     Urinary tract infection        Current Outpatient Medications:      blood glucose test (CONTOUR NEXT TEST STRIPS) strips, Use 1 each As Directed 2 (two) times a day. Dispense brand per patient's insurance at pharmacy discretion., Disp: 100 strip, Rfl: 3     blood-glucose meter (CONTOUR NEXT METER) Misc, Use 1 each As Directed 2 (two) times a day., Disp: 1 each, Rfl: 0     blood-glucose meter (CONTOUR NEXT METER) Misc, Use daily for diabetes testing, Disp: 1 each, Rfl: 0     desog-e.estradiol/e.estradiol (KARIVA) 0.15-0.02 mgx21 /0.01 mg x 5 per tablet, Take 1 tablet by mouth daily., Disp: 84 tablet, Rfl: 4     generic lancets (FINGERSTIX LANCETS), 1 each by In Vitro route 2 (two) times a day., Disp: 100 each, Rfl: 3     metFORMIN (GLUCOPHAGE) 500 MG tablet, Take 2 tablets  (1,000 mg total) by mouth 2 (two) times a day with meals., Disp: 120 tablet, Rfl: 5      Tobacco Use      Smoking status: Never Smoker      Smokeless tobacco: Never Used      Objective:     Allergies:  Patient has no known allergies.    Vitals:    01/21/21 0915   BP: 126/79   Pulse: 89     Body mass index is 39.97 kg/m .    General: Alert and oriented x 3, in no apparent distress  Cardiac: Regular rate and rhythm, no murmurs  Pulmonary: Lungs clear to auscultation bilaterally, No crackles, rales, rhonchi, or wheezing noted  Genitourinary: External genitalia is normal in appearance, vaginal walls are healthy, cervix is well visualized and normal in appearance, no significant discharge noted  Diabetic Foot Exam:  Normal Exam.  Normal pedal pulses bilaterally.  Skin appears healthy and without significant injury.  Sensation is intact to monofilament bilaterally.      Results for orders placed or performed in visit on 01/21/21   Wet Prep, Vaginal    Specimen: Vaginal; Genital   Result Value Ref Range    Yeast Result No yeast seen No yeast seen    Trichomonas No Trichomonas seen No Trichomonas seen    Clue Cells, Wet Prep No Clue cells seen No Clue cells seen   Glycosylated Hemoglobin A1c   Result Value Ref Range    Hemoglobin A1c 13.2 (H) <=5.6 %   HIV Antigen/Antibody Screening [place]   Result Value Ref Range    HIV Antigen / Antibody Negative Negative   Other labs pending.    Assessment and Plan:     1. Type 2 diabetes mellitus without complication, without long-term current use of insulin (H)  A1c: 13.2%, improved from >14% in November 2020  Eye Exam: UTD  Foot Exam: done today, normal  Smoking: No  On a statin: no, due to age, female  Blood Pressure: controlled  Microalbumin: UTD  On ACE inhibitor: no, due to age, female  Uncontrolled.  A1c has improved, which is encouraging.  She is already taking maximum dose of Metformin.  We need to add additional medicine.  She actually would be willing to use  injection/thought form of medication.  Will refer to Diabetic Ed to discuss which medication to add.  - Glycosylated Hemoglobin A1c    2. Screen for STD (sexually transmitted disease)  History of frequent trichomonas infections, but recently last few tests have been negative.  Will inform patient of her results.  - Wet Prep, Vaginal  - Chlamydia trachomatis & Neisseria gonorrhoeae, Amplified Detection  - HIV Antigen/Antibody Screening [place]  - Syphilis Screen, [place]    This dictation uses voice recognition software, which may contain typographical errors.     stretcher

## 2021-08-06 ENCOUNTER — TRANSCRIPTION ENCOUNTER (OUTPATIENT)
Age: 71
End: 2021-08-06

## 2021-08-12 NOTE — PROGRESS NOTE ADULT - PROBLEM SELECTOR PROBLEM 3
Outpatient Physical Therapy Ortho Treatment Note  [facility]     Patient Name: Ema Gilmore  : 2003  MRN: [id number]  Today's Date: 2021      Visit Date: 2021    Visit Dx:    ICD-10-CM ICD-9-CM   1. Chondromalacia of knee, right  M94.261 717.7       There is no problem list on file for this patient.       Past Medical History:   Diagnosis Date   • Fracture of wrist     left   • Fracture, clavicle         Past Surgical History:   Procedure Laterality Date   • EAR TUBES                         PT Assessment/Plan     Row Name 21 1030          PT Assessment    Assessment Comments  Pt tolerated progression of ther ex well.   -KM        PT Plan    PT Plan Comments  Continue per POC  -KM       User Key  (r) = Recorded By, (t) = Taken By, (c) = Cosigned By    Initials Name Provider Type    Mirna Bang PTA Physical Therapy Assistant            OP Exercises     Row Name 21 1030             Subjective Comments    Subjective Comments  Pt states her knee continues to do okay   -KM         Exercise 1    Exercise Name 1  Hamstring stretches  -KM      Cueing 1  Verbal;Tactile  -KM      Reps 1  15  -KM      Time 1  10 secs  -KM         Exercise 2    Exercise Name 2  QS with Russian Stim  -KM      Cueing 2  Verbal;Tactile  -KM      Time 2  10 min 10/10  -KM         Exercise 3    Exercise Name 3  SLR  -KM      Cueing 3  Verbal;Tactile  -KM      Reps 3  25  -KM      Time 3  3#  -KM         Exercise 4    Exercise Name 4  Hip ADD  -KM      Cueing 4  Verbal;Tactile  -KM      Reps 4  25  -KM      Time 4  3#  -KM         Exercise 5    Exercise Name 5  SAQ  -KM      Cueing 5  Verbal;Tactile  -KM      Reps 5  50  -KM      Time 5  3#  -KM         Exercise 6    Exercise Name 6  LAQ/ball squeeze  -KM      Cueing 6  Verbal;Tactile  -KM      Reps 6  50  -KM      Time 6  3#  -KM         Exercise 7    Exercise Name 7  TKE vs theraband  -KM      Cueing 7  Verbal;Demo  -KM      Reps 7  50  -KM      Time 7  
"gold  -KM         Exercise 8    Exercise Name 8  Partial squat/ball squeeze  -KM      Cueing 8  Verbal;Demo  -KM      Reps 8  30  -KM         Exercise 9    Exercise Name 9  Lateral Dips on 6\" step  -KM      Cueing 9  Verbal;Demo  -KM      Reps 9  25  -KM        User Key  (r) = Recorded By, (t) = Taken By, (c) = Cosigned By    Initials Name Provider Type    Mirna Bang PTA Physical Therapy Assistant                                          Time Calculation:   Start Time: 1030  Stop Time: 1114  Time Calculation (min): 44 min  Therapy Charges for Today     Code Description Service Date Service Provider Modifiers Qty    80509446669 HC PT THER PROC EA 15 MIN 8/12/2021 Mirna Lomeli PTA GP 1                    Mirna Lomeli PTA  8/12/2021     "
Parkinsons disease

## 2021-08-19 ENCOUNTER — APPOINTMENT (OUTPATIENT)
Dept: ORTHOPEDIC SURGERY | Facility: CLINIC | Age: 71
End: 2021-08-19
Payer: MEDICARE

## 2021-08-19 ENCOUNTER — NON-APPOINTMENT (OUTPATIENT)
Age: 71
End: 2021-08-19

## 2021-08-19 VITALS — SYSTOLIC BLOOD PRESSURE: 118 MMHG | DIASTOLIC BLOOD PRESSURE: 75 MMHG | HEART RATE: 91 BPM

## 2021-08-19 DIAGNOSIS — M25.562 PAIN IN LEFT KNEE: ICD-10-CM

## 2021-08-19 DIAGNOSIS — L03.116 CELLULITIS OF LEFT LOWER LIMB: ICD-10-CM

## 2021-08-19 PROCEDURE — 99212 OFFICE O/P EST SF 10 MIN: CPT

## 2021-08-19 PROCEDURE — 73562 X-RAY EXAM OF KNEE 3: CPT | Mod: LT

## 2021-08-19 RX ORDER — NAPROXEN 500 MG/1
500 TABLET ORAL
Qty: 40 | Refills: 1 | Status: DISCONTINUED | COMMUNITY
Start: 2019-07-30 | End: 2021-08-19

## 2021-08-19 RX ORDER — PANTOPRAZOLE 40 MG/1
40 TABLET, DELAYED RELEASE ORAL
Qty: 90 | Refills: 0 | Status: DISCONTINUED | COMMUNITY
Start: 2019-07-30 | End: 2021-08-19

## 2021-08-19 RX ORDER — BIMATOPROST 0.1 MG/ML
SOLUTION/ DROPS OPHTHALMIC
Refills: 0 | Status: ACTIVE | COMMUNITY

## 2021-08-19 RX ORDER — CITALOPRAM HYDROBROMIDE 10 MG/1
10 TABLET, FILM COATED ORAL
Refills: 0 | Status: ACTIVE | COMMUNITY

## 2021-08-19 RX ORDER — PANTOPRAZOLE 40 MG/1
40 TABLET, DELAYED RELEASE ORAL DAILY
Qty: 90 | Refills: 0 | Status: DISCONTINUED | COMMUNITY
Start: 2017-09-05 | End: 2021-08-19

## 2021-08-19 RX ORDER — PANTOPRAZOLE 40 MG/1
40 TABLET, DELAYED RELEASE ORAL DAILY
Qty: 30 | Refills: 0 | Status: DISCONTINUED | COMMUNITY
Start: 2018-03-13 | End: 2021-08-19

## 2021-08-19 RX ORDER — NAPROXEN 500 MG/1
500 TABLET ORAL
Qty: 60 | Refills: 0 | Status: DISCONTINUED | COMMUNITY
Start: 2018-03-13 | End: 2021-08-19

## 2021-08-19 RX ORDER — TRIAMTERENE AND HYDROCHLOROTHIAZIDE 37.5; 25 MG/1; MG/1
37.5-25 CAPSULE ORAL
Qty: 90 | Refills: 0 | Status: DISCONTINUED | COMMUNITY
Start: 2016-12-29 | End: 2021-08-19

## 2021-08-19 RX ORDER — OMEPRAZOLE 20 MG/1
20 CAPSULE, DELAYED RELEASE ORAL DAILY
Qty: 60 | Refills: 0 | Status: DISCONTINUED | COMMUNITY
Start: 2017-11-14 | End: 2021-08-19

## 2021-08-19 RX ORDER — AMOXICILLIN AND CLAVULANATE POTASSIUM 500; 125 MG/1; MG/1
500-125 TABLET, FILM COATED ORAL
Refills: 0 | Status: ACTIVE | COMMUNITY

## 2021-08-19 RX ORDER — PANTOPRAZOLE 40 MG/1
40 TABLET, DELAYED RELEASE ORAL
Qty: 90 | Refills: 0 | Status: DISCONTINUED | COMMUNITY
Start: 2017-08-08 | End: 2021-08-19

## 2021-08-19 RX ORDER — AMOXICILLIN 500 MG/1
500 CAPSULE ORAL
Qty: 8 | Refills: 2 | Status: DISCONTINUED | COMMUNITY
Start: 2017-05-09 | End: 2021-08-19

## 2021-08-19 RX ORDER — BACLOFEN 10 MG/1
10 TABLET ORAL 3 TIMES DAILY
Qty: 30 | Refills: 0 | Status: DISCONTINUED | COMMUNITY
Start: 2019-07-17 | End: 2021-08-19

## 2021-08-20 LAB
BASOPHILS # BLD AUTO: 0.05 K/UL
BASOPHILS NFR BLD AUTO: 0.6 %
CRP SERPL-MCNC: 12 MG/L
EOSINOPHIL # BLD AUTO: 0.11 K/UL
EOSINOPHIL NFR BLD AUTO: 1.3 %
ERYTHROCYTE [SEDIMENTATION RATE] IN BLOOD BY WESTERGREN METHOD: 64 MM/HR
HCT VFR BLD CALC: 41.3 %
HGB BLD-MCNC: 12.6 G/DL
IMM GRANULOCYTES NFR BLD AUTO: 0.4 %
LYMPHOCYTES # BLD AUTO: 1.37 K/UL
LYMPHOCYTES NFR BLD AUTO: 16.7 %
MAN DIFF?: NORMAL
MCHC RBC-ENTMCNC: 30.5 GM/DL
MCHC RBC-ENTMCNC: 31.5 PG
MCV RBC AUTO: 103.3 FL
MONOCYTES # BLD AUTO: 0.63 K/UL
MONOCYTES NFR BLD AUTO: 7.7 %
NEUTROPHILS # BLD AUTO: 5.99 K/UL
NEUTROPHILS NFR BLD AUTO: 73.3 %
PLATELET # BLD AUTO: 285 K/UL
RBC # BLD: 4 M/UL
RBC # FLD: 16 %
WBC # FLD AUTO: 8.18 K/UL

## 2021-09-14 ENCOUNTER — APPOINTMENT (OUTPATIENT)
Dept: ORTHOPEDIC SURGERY | Facility: CLINIC | Age: 71
End: 2021-09-14
Payer: MEDICARE

## 2021-09-14 VITALS — HEART RATE: 89 BPM | SYSTOLIC BLOOD PRESSURE: 133 MMHG | DIASTOLIC BLOOD PRESSURE: 81 MMHG

## 2021-09-14 DIAGNOSIS — Z96.659 BROKEN INTERNAL JOINT PROSTHESIS, OTHER SITE, INITIAL ENCOUNTER: ICD-10-CM

## 2021-09-14 DIAGNOSIS — M25.562 PAIN IN RIGHT KNEE: ICD-10-CM

## 2021-09-14 DIAGNOSIS — M25.561 PAIN IN RIGHT KNEE: ICD-10-CM

## 2021-09-14 DIAGNOSIS — T84.018A BROKEN INTERNAL JOINT PROSTHESIS, OTHER SITE, INITIAL ENCOUNTER: ICD-10-CM

## 2021-09-14 PROCEDURE — 99214 OFFICE O/P EST MOD 30 MIN: CPT

## 2021-09-14 RX ORDER — NAPROXEN 500 MG/1
500 TABLET ORAL
Qty: 40 | Refills: 1 | Status: ACTIVE | COMMUNITY
Start: 2021-09-14 | End: 1900-01-01

## 2021-09-14 RX ORDER — LATANOPROST/PF 0.005 %
DROPS OPHTHALMIC (EYE)
Refills: 0 | Status: DISCONTINUED | COMMUNITY
End: 2021-09-14

## 2021-09-14 RX ORDER — AMOXICILLIN 500 MG/1
500 TABLET, FILM COATED ORAL
Qty: 20 | Refills: 3 | Status: DISCONTINUED | COMMUNITY
Start: 2019-02-01 | End: 2021-09-14

## 2021-09-14 RX ORDER — EZETIMIBE 10 MG/1
TABLET ORAL
Refills: 0 | Status: ACTIVE | COMMUNITY

## 2021-09-14 RX ORDER — PRAMIPEXOLE DIHYDROCHLORIDE 0.75 MG/1
TABLET ORAL
Refills: 0 | Status: ACTIVE | COMMUNITY

## 2021-10-22 ENCOUNTER — RX RENEWAL (OUTPATIENT)
Age: 71
End: 2021-10-22

## 2022-02-14 NOTE — PRE-OP CHECKLIST - ADDITIONAL CONSENTS
Procedures     Procedure:   1. Flexible cysto-uretheroscopy and ureteral stent removal    Pre Procedure Diagnosis:  1. Indwelling ureteral stent s/p right URS/HLL/SBE stent 2/2/22    Post Procedure Diagnosis:  1. same    Surgeon: Yoselin Moreland MD    Anesthesia: 2% uro-jet lidocaine jelly for local analgesia    Procedure note:  A flexible cysto-urethroscopy was performed after consent was obtained.  The risks and benefits were explained. 2% lidocaine urojet was used for local analgesia. The genitalia was prepped and draped in the sterile fashion. The flexible scope was advanced into the urethra and into the bladder. The distal coil of the ureteral stent was identified and grasped with the cystoscopic alligator graspers and removed intact. The flexible cystoscope was removed. The patient tolerated the procedure well without complication.     Findings in summary:  Right ureteral stent removed intact      Plan:  1. Pt tolerated stent removal well.  2. 90% Calcium oxalate dihydrate   3. Strategy for stone prevention-  - try to limit salt and red meat intake  - stone formers are encouraged to hydrate enough to produce 2.5 - 3.0 liters of urine output daily  - adding citrate to water in the form of brittney, lemon juice, or crystal light has been shown to be preventative of kidney stones  - limit iced tea and prudencio   - excessive Tums, Rolaids or Vitamin C supplements (above normal daily recommended values) may possibly increase their risk of stone formation    Since your stone was a Calcium oxalate stone, there are more specific recommendations for this type of stone as it is one of the most common stones and most studied types of stones:  -consume 1000-1200mg of dietary Calcium daily  -limit high oxalate foods (look online, list is long)  -limit non-dairy protein  -increase fruits and vegetables  -limit sodium to less than 2300mg/daily (ideally <1500mg/daily)    
rep

## 2022-09-22 NOTE — H&P PST ADULT - HEIGHT IN FEET
Quality 431: Preventive Care And Screening: Unhealthy Alcohol Use - Screening: Patient not identified as an unhealthy alcohol user when screened for unhealthy alcohol use using a systematic screening method Quality 47: Advance Care Plan: Advance Care Planning discussed and documented in the medical record; patient did not wish or was not able to name a surrogate decision maker or provide an advance care plan. Quality 154 Part A: Falls: Risk Assessment (Should Be Reported With Measure 155.): Falls risk assessment completed and documented in the past 12 months. Quality 111:Pneumonia Vaccination Status For Older Adults: Pneumococcal vaccine (PPSV23) was not administered on or after patient’s 60th birthday and before the end of the measurement period, reason not otherwise specified Detail Level: Detailed Quality 226: Preventive Care And Screening: Tobacco Use: Screening And Cessation Intervention: Patient screened for tobacco use and is an ex/non-smoker Quality 110: Preventive Care And Screening: Influenza Immunization: Influenza Immunization Ordered or Recommended, but not Administered due to system reason Quality 154 Part B: Falls: Risk Screening (Should Be Reported With Measure 155.): Patient screened for future fall risk; documentation of no falls in the past year or only one fall without injury in the past year Quality 155 (Denominator): Falls Plan Of Care: Plan of Care not Documented, Reason not Otherwise Specified 5

## 2022-09-26 ENCOUNTER — APPOINTMENT (OUTPATIENT)
Dept: ORTHOPEDIC SURGERY | Facility: CLINIC | Age: 72
End: 2022-09-26

## 2022-09-26 ENCOUNTER — NON-APPOINTMENT (OUTPATIENT)
Age: 72
End: 2022-09-26

## 2022-09-26 VITALS — SYSTOLIC BLOOD PRESSURE: 167 MMHG | DIASTOLIC BLOOD PRESSURE: 93 MMHG | HEART RATE: 60 BPM

## 2022-09-26 DIAGNOSIS — M25.561 PAIN IN RIGHT KNEE: ICD-10-CM

## 2022-09-26 DIAGNOSIS — M25.562 PAIN IN RIGHT KNEE: ICD-10-CM

## 2022-09-26 PROCEDURE — 73562 X-RAY EXAM OF KNEE 3: CPT | Mod: 50

## 2022-09-26 PROCEDURE — 99213 OFFICE O/P EST LOW 20 MIN: CPT

## 2022-09-26 RX ORDER — CELECOXIB 200 MG/1
200 CAPSULE ORAL TWICE DAILY
Qty: 42 | Refills: 1 | Status: ACTIVE | COMMUNITY
Start: 2022-09-26 | End: 1900-01-01

## 2022-09-26 NOTE — REASON FOR VISIT
[Follow-Up Visit] : a follow-up visit for [Knee Pain] : knee pain [FreeTextEntry2] : S/P Left knee TKR 2/24/16,Right knee TKR 2/29/16, Left knee TKR re implantation 4/26/17 none

## 2022-09-26 NOTE — HISTORY OF PRESENT ILLNESS
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

## 2022-09-26 NOTE — DISCUSSION/SUMMARY
[de-identified] : Patient advised an ace bandage as she cannot wear a knee brace or knee immobilizer.\par Celebrex and Lidoderm patches.\par Will follow up with DR Oh

## 2022-09-26 NOTE — PHYSICAL EXAM
[Antalgic] : antalgic [Walker] : ambulates with walker [LE] : 5/5 motor strength in bilateral lower extremities [DP] : dorsalis pedis 2+ and symmetric bilaterally [PT] : posterior tibial 2+ and symmetric bilaterally [Normal RLE] : Right Lower Extremity: No scars, rashes, lesions, ulcers, skin intact [Normal LLE] : Left Lower Extremity: No scars, rashes, lesions, ulcers, skin intact [Normal Touch] : sensation intact for touch [Normal] : no peripheral adenopathy appreciated [de-identified] : X-rays were reviewed before the examination range of motion of the right knee shows full extension and flexion of about 110 degrees no medial or lateral instability anterior drawer or AP laxity patient is tender to palpation over the lateral aspect of the patella. [de-identified] : Reviewed the x-rays of bilateral knees 3 views done in the office today the left knee shows a lateralization of the patella component with fragmented patella\par The right patella shows what we believe is a small avulsion fragment over the lateral aspect of the patella.

## 2022-11-01 ENCOUNTER — APPOINTMENT (OUTPATIENT)
Dept: ORTHOPEDIC SURGERY | Facility: CLINIC | Age: 72
End: 2022-11-01

## 2022-11-01 VITALS — DIASTOLIC BLOOD PRESSURE: 76 MMHG | HEART RATE: 80 BPM | SYSTOLIC BLOOD PRESSURE: 118 MMHG

## 2022-11-01 DIAGNOSIS — Z96.652 PRESENCE OF LEFT ARTIFICIAL KNEE JOINT: ICD-10-CM

## 2022-11-01 DIAGNOSIS — Z96.653 PRESENCE OF ARTIFICIAL KNEE JOINT, BILATERAL: ICD-10-CM

## 2022-11-01 PROCEDURE — 99213 OFFICE O/P EST LOW 20 MIN: CPT

## 2022-11-03 ENCOUNTER — APPOINTMENT (OUTPATIENT)
Dept: CT IMAGING | Facility: CLINIC | Age: 72
End: 2022-11-03

## 2022-11-03 ENCOUNTER — RESULT REVIEW (OUTPATIENT)
Age: 72
End: 2022-11-03

## 2022-11-03 PROCEDURE — 73700 CT LOWER EXTREMITY W/O DYE: CPT | Mod: RT

## 2022-11-03 PROCEDURE — 76377 3D RENDER W/INTRP POSTPROCES: CPT

## 2022-11-08 ENCOUNTER — NON-APPOINTMENT (OUTPATIENT)
Age: 72
End: 2022-11-08

## 2022-11-12 ENCOUNTER — APPOINTMENT (OUTPATIENT)
Dept: ORTHOPEDIC SURGERY | Facility: CLINIC | Age: 72
End: 2022-11-12

## 2022-11-12 VITALS
WEIGHT: 246 LBS | DIASTOLIC BLOOD PRESSURE: 76 MMHG | SYSTOLIC BLOOD PRESSURE: 141 MMHG | BODY MASS INDEX: 43.59 KG/M2 | HEART RATE: 78 BPM | HEIGHT: 63 IN

## 2022-11-12 DIAGNOSIS — T84.019D: ICD-10-CM

## 2022-11-12 PROCEDURE — 99213 OFFICE O/P EST LOW 20 MIN: CPT

## 2022-11-12 NOTE — PHYSICAL EXAM
[Antalgic] : antalgic [Walker] : ambulates with walker [LE] : Sensory: Intact in bilateral lower extremities [DP] : dorsalis pedis 2+ and symmetric bilaterally [PT] : posterior tibial 2+ and symmetric bilaterally [Normal RUE] : Right Upper Extremity: No scars, rashes, lesions, ulcers, skin intact [Normal LUE] : Left Upper Extremity: No scars, rashes, lesions, ulcers, skin intact [Normal RLE] : Right Lower Extremity: No scars, rashes, lesions, ulcers, skin intact [Normal LLE] : Left Lower Extremity: No scars, rashes, lesions, ulcers, skin intact [Obese] : obese [Normal] : no peripheral adenopathy appreciated [de-identified] : Examination of the left knee:The skin is warm and dry there is significant venous stasis changes noted distally there is a well-healed surgical incision consistent with her two-stage revision knee replacement. There is no erythema around the knee there is no significant swelling or tenderness to palpation range of motion is 115. of flexion ligamentous exam is stable with varus valgus stress and anterior posterior drawer.The patient is able to extend her knee against gravity and extensor mechanism appears to be intact.  No significant pain to palpation over the patella.  Her calves are soft, nontender, no evidence of DVT at this time.  Patient has good motor and sensory to both legs.\par Examination of the right knee, well-healed incision without erythema, drainage, or evidence of cellulitis.  Less than 5 degree laxity with varus valgus stresses.  Negative anteroposterior drawer.  No extension lag.  No flexion contractures.  Range of motion 0-1 20.  There is some pain on further flexion at the patellofemoral joint. [de-identified] : MRI left and right knee: please see report for full details\par \par Questionable loosening right patellar component.\par \par CAT scan of the right knee which was done at the [facility] on November 4, 2022 shows mildly displaced age-indeterminate patella periprosthetic fracture at the superior portion.  Lucency beneath the upper superior hardware of the bone metal interface concerning for loosening.

## 2022-11-12 NOTE — HISTORY OF PRESENT ILLNESS
[Stable] : stable [6] : a current pain level of 6/10 [4] : an average pain level of 4/10 [2] : a minimum pain level of 2/10 [7] : a maximum pain level of 7/10 [Intermit.] : ~He/She~ states the symptoms seem to be intermittent [Bending] : worsened by bending [Sitting] : worsened by sitting [Acetaminophen] : relieved by acetaminophen [Knee Flexion] : worsened with knee flexion [Exercise Regimen] : relieved by exercise regimen [Heat] : relieved by heat [Ice] : relieved by ice [NSAIDs] : relieved by nonsteroidal anti-inflammatory drugs [Physical Therapy] : relieved by physical therapy [Opioid Analgesics] : relieved by opioid analgesics [Rest] : relieved by rest [de-identified] : 72-year-old female presents for followup she is a history of two-stage revision left knee replacement secondary to infection in 2017. 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 She presents for followup notes that she has little left knee pain with occasional buckling ..  The patient was having some right knee pain as well.  Patient is status post right total knee replacement done by Dr. Mina Hill February 29, 2016.  She did see the PA not too long ago whereby there were MRIs obtained of both knees.  Patient is here for MRI review.  She is ambulating independently.  She is taking anti-inflammatories on appearing basis for discomfort.  At her last visit, we got a CAT scan to look more specifically the knee.  She is here for CAT scan review. [Ataxia] : no ataxia [Incontinence] : no incontinence [Loss of Dexterity] : good dexterity [Urinary Ret.] : no urinary retention

## 2022-11-12 NOTE — REASON FOR VISIT
[Follow-Up Visit] : a follow-up visit for [Knee Pain] : knee pain [FreeTextEntry2] : CT scan of right knee done 11/3/22 for review today

## 2022-11-12 NOTE — DISCUSSION/SUMMARY
[Medication Risks Reviewed] : Medication risks reviewed [Surgical risks reviewed] : Surgical risks reviewed [de-identified] : The patient a 72-year-old female with periprosthetic loosening of the patella component status post right total knee replacement done by another physician.. Based upon the patients continued symptoms and failure to respond to conservative treatment I have recommended revision right e replacement for this patient with bearing change and resurfacing of a lucent patella.. A long discussion took place with the patient describing what a total joint revision replacement is and what the procedure would entail. A total knee model, similar to the implant that will be used during the operation was utilized to demonstrate and to discuss the various bearing surfaces of the implants. The hospitalization and post-operative care and rehabilitation were also discussed. The use of perioperative antibiotics and DVT prophylaxis were discussed. The risk, benefits and alternatives to a surgical intervention were discussed at length with the patient. The patient was also advised of risks related to the medical comorbidities and elevated body mass index (BMI).  A lengthy discussion took place to review the most common complications including but not limited to: deep vein thrombosis, pulmonary embolus, heart attack, stroke, infection, wound breakdown, numbness, damage to nerves, tendon, muscles, arteries or other blood vessels, death and other possible complications from anesthesia. The patient was told that we will take steps to minimize these risks by using sterile technique, antibiotics and DVT prophylaxis when appropriate and follow the patient postoperatively in the office setting to monitor progress. The possibility of recurrent pain, no improvement in pain and actual worsening of pain were also discussed with the patient. \par \par The patient was advised of the goals, alternatives, risks and benefits of autologous, directed and banked blood product transfusions for perioperative blood losses.\par The discharge plan of care focused on the patient going home following surgery.  I encouraged the patient to make the necessary arrangements to have someone stay with them when they are discharged home.  Following discharge, a home care nurse will visit the patient.  He/she will open your home care case and request home physical therapy services.  Home physical therapy will commence following discharge provided it is appropriate and covered by his health insurance benefit plan.  \par \par The patient was advised that they will require a medical preoperative risk evaluation by their PCP. Further medical subspecialty clearances such as cardiac may be indicated if felt needed by their PCP.\par \par The benefits of surgery were discussed with the patient including the potential for improving his/her current clinical condition through operative intervention. Alternatives to surgical intervention including continued conservative management were also discussed in detail. All questions were answered to the satisfaction of the patient. The treatment plan of care, as well as a model of a total knee equivalent to the one that will be used for their total joint replacement, was shared with the patient.  The patient agreed to the plan of care as well as the use of implants in their total joint replacement.\par The patient participated in an interactive discussion of the revision right TKR implant planned for their surgery with questions answered, agreed with the treatment plan, and has decided to move forward with elective revision right TKR as planned.  The patient understands that we will do a bearing change and resurfacing of a lucent patella.  However, the patient does have a healed/nonunion of a fracture if there is not enough bone we may have to do an augmented Liz patella system.  If this is the case, patient will be immobilized for several months and extension with limited flexion over the course of several months and gentle physical therapy.  She is willing to accept this.  Patient will need medical clearance prior to surgery.  She understands that even with revision with just a bearing change to resurface the patella she still may have pain discomfort and limited range of motion.  She is willing to accept this.  She will book her surgery at her earliest convenience.\par \par

## 2022-12-12 ENCOUNTER — OUTPATIENT (OUTPATIENT)
Dept: OUTPATIENT SERVICES | Facility: HOSPITAL | Age: 72
LOS: 1 days | End: 2022-12-12

## 2022-12-12 VITALS
SYSTOLIC BLOOD PRESSURE: 170 MMHG | WEIGHT: 244.93 LBS | RESPIRATION RATE: 16 BRPM | TEMPERATURE: 98 F | HEART RATE: 90 BPM | DIASTOLIC BLOOD PRESSURE: 90 MMHG | HEIGHT: 62.5 IN | OXYGEN SATURATION: 98 %

## 2022-12-12 DIAGNOSIS — Z96.651 PRESENCE OF RIGHT ARTIFICIAL KNEE JOINT: Chronic | ICD-10-CM

## 2022-12-12 DIAGNOSIS — Z98.89 OTHER SPECIFIED POSTPROCEDURAL STATES: Chronic | ICD-10-CM

## 2022-12-12 DIAGNOSIS — Z98.890 OTHER SPECIFIED POSTPROCEDURAL STATES: ICD-10-CM

## 2022-12-12 DIAGNOSIS — T84.019D BROKEN INTERNAL JOINT PROSTHESIS, UNSPECIFIED SITE, SUBSEQUENT ENCOUNTER: ICD-10-CM

## 2022-12-12 DIAGNOSIS — M25.561 PAIN IN RIGHT KNEE: Chronic | ICD-10-CM

## 2022-12-12 DIAGNOSIS — Z98.890 OTHER SPECIFIED POSTPROCEDURAL STATES: Chronic | ICD-10-CM

## 2022-12-12 DIAGNOSIS — M25.561 PAIN IN RIGHT KNEE: ICD-10-CM

## 2022-12-12 DIAGNOSIS — Z96.659 PRESENCE OF UNSPECIFIED ARTIFICIAL KNEE JOINT: Chronic | ICD-10-CM

## 2022-12-12 DIAGNOSIS — Z01.818 ENCOUNTER FOR OTHER PREPROCEDURAL EXAMINATION: ICD-10-CM

## 2022-12-12 LAB
A1C WITH ESTIMATED AVERAGE GLUCOSE RESULT: 5.5 % — SIGNIFICANT CHANGE UP (ref 4–5.6)
ALBUMIN SERPL ELPH-MCNC: 3.9 G/DL — SIGNIFICANT CHANGE UP (ref 3.3–5)
ALP SERPL-CCNC: 78 U/L — SIGNIFICANT CHANGE UP (ref 30–120)
ALT FLD-CCNC: <10 U/L DA — LOW (ref 10–60)
ANION GAP SERPL CALC-SCNC: 8 MMOL/L — SIGNIFICANT CHANGE UP (ref 5–17)
APTT BLD: 32.3 SEC — SIGNIFICANT CHANGE UP (ref 27.5–35.5)
AST SERPL-CCNC: 17 U/L — SIGNIFICANT CHANGE UP (ref 10–40)
BILIRUB SERPL-MCNC: 0.6 MG/DL — SIGNIFICANT CHANGE UP (ref 0.2–1.2)
BLD GP AB SCN SERPL QL: SIGNIFICANT CHANGE UP
BUN SERPL-MCNC: 28 MG/DL — HIGH (ref 7–23)
CALCIUM SERPL-MCNC: 9.3 MG/DL — SIGNIFICANT CHANGE UP (ref 8.4–10.5)
CHLORIDE SERPL-SCNC: 107 MMOL/L — SIGNIFICANT CHANGE UP (ref 96–108)
CO2 SERPL-SCNC: 27 MMOL/L — SIGNIFICANT CHANGE UP (ref 22–31)
CREAT SERPL-MCNC: 1.07 MG/DL — SIGNIFICANT CHANGE UP (ref 0.5–1.3)
EGFR: 55 ML/MIN/1.73M2 — LOW
ESTIMATED AVERAGE GLUCOSE: 111 MG/DL — SIGNIFICANT CHANGE UP (ref 68–114)
GLUCOSE SERPL-MCNC: 106 MG/DL — HIGH (ref 70–99)
HCT VFR BLD CALC: 40.6 % — SIGNIFICANT CHANGE UP (ref 34.5–45)
HGB BLD-MCNC: 13.1 G/DL — SIGNIFICANT CHANGE UP (ref 11.5–15.5)
INR BLD: 1.03 RATIO — SIGNIFICANT CHANGE UP (ref 0.88–1.16)
MCHC RBC-ENTMCNC: 32.3 GM/DL — SIGNIFICANT CHANGE UP (ref 32–36)
MCHC RBC-ENTMCNC: 32.8 PG — SIGNIFICANT CHANGE UP (ref 27–34)
MCV RBC AUTO: 101.8 FL — HIGH (ref 80–100)
MRSA PCR RESULT.: SIGNIFICANT CHANGE UP
NRBC # BLD: 0 /100 WBCS — SIGNIFICANT CHANGE UP (ref 0–0)
PLATELET # BLD AUTO: 242 K/UL — SIGNIFICANT CHANGE UP (ref 150–400)
POTASSIUM SERPL-MCNC: 5 MMOL/L — SIGNIFICANT CHANGE UP (ref 3.5–5.3)
POTASSIUM SERPL-SCNC: 5 MMOL/L — SIGNIFICANT CHANGE UP (ref 3.5–5.3)
PROT SERPL-MCNC: 8.2 G/DL — SIGNIFICANT CHANGE UP (ref 6–8.3)
PROTHROM AB SERPL-ACNC: 12.2 SEC — SIGNIFICANT CHANGE UP (ref 10.5–13.4)
RBC # BLD: 3.99 M/UL — SIGNIFICANT CHANGE UP (ref 3.8–5.2)
RBC # FLD: 13.9 % — SIGNIFICANT CHANGE UP (ref 10.3–14.5)
S AUREUS DNA NOSE QL NAA+PROBE: DETECTED
SARS-COV-2 RNA SPEC QL NAA+PROBE: SIGNIFICANT CHANGE UP
SODIUM SERPL-SCNC: 142 MMOL/L — SIGNIFICANT CHANGE UP (ref 135–145)
WBC # BLD: 6.42 K/UL — SIGNIFICANT CHANGE UP (ref 3.8–10.5)
WBC # FLD AUTO: 6.42 K/UL — SIGNIFICANT CHANGE UP (ref 3.8–10.5)

## 2022-12-12 PROCEDURE — 93010 ELECTROCARDIOGRAM REPORT: CPT

## 2022-12-12 NOTE — H&P PST ADULT - NSICDXPASTMEDICALHX_GEN_ALL_CORE_FT
PAST MEDICAL HISTORY:  Chronic venous insufficiency     Hyperlipidemia     Hypothyroid     Morbid obesity     Osteoarthritis     Parkinsons disease diagnosed 5 years ago     PAST MEDICAL HISTORY:  2019 novel coronavirus disease (COVID-19)     Chronic venous insufficiency     Hyperlipidemia     Hypothyroid     Morbid obesity     Osteoarthritis     Parkinsons disease diagnosed 5 years ago     PAST MEDICAL HISTORY:  2019 novel coronavirus disease (COVID-19)     Chronic venous insufficiency     Hyperlipidemia     Hypothyroid     Morbid obesity     Morbid obesity with BMI of 40.0-44.9, adult     Osteoarthritis     Parkinsons disease diagnosed 5 years ago

## 2022-12-12 NOTE — H&P PST ADULT - NSICDXPASTSURGICALHX_GEN_ALL_CORE_FT
PAST SURGICAL HISTORY:  H/O total knee replacement, right     History of urostomy     Right knee pain     S/P  x 2    S/P knee replacement bilateral 1 week apart     S/P knee surgery s/p cement spacer due to bacterial infection 2017

## 2022-12-12 NOTE — H&P PST ADULT - PROBLEM SELECTOR PLAN 1
revision of right knee replacement on 12/14  covid testing today  patient to be seen by PMD today-f/u BP   labs to be f/u  MRSA-need treatment if positive revision of right knee replacement on 12/14  covid testing today  patient to be seen by PMD today-f/u BP -patient didn't take HCTZ for more than 2 weeks  labs to be f/u  MRSA-need treatment if positive

## 2022-12-12 NOTE — H&P PST ADULT - MUSCULOSKELETAL
decreased ROM/decreased ROM due to pain/joint swelling/back exam/abnormal gait details… right knee/decreased ROM/decreased ROM due to pain/joint swelling/back exam/abnormal gait

## 2022-12-12 NOTE — H&P PST ADULT - ENMT COMMENTS
Lumbar Epidural Injection: Recovery at Home    After a lumbar epidural injection, you don’t need to stay in bed when you get home. In fact, it’s best to walk around if you feel up to it. Just be careful about being too active. Even if you feel better right away, avoid activities that may strain your back. And follow up on all treatment with your doctor.  What to know about pain relief  Keep in mind that some people feel more pain at first. It usually goes away within a few days. You may also have headaches or trouble sleeping, but if these symptoms are severe, you should call your doctor right away. These should also go away within a few days. In general:  · An injection to reduce inflammation takes a few days to work, sometimes even up to a week. There may even be more pain at first.  · An injection to help locate the source of pain may give only brief pain relief. Later, you’ll feel the same as you did before the injection.  Tips for recovery  Whether you were injected for pain relief or diagnosis, these tips will help you recover:  · Take walks when you feel up to it.  · Rest if needed, but get up and move around after sitting for half an hour.  · Don’t exercise vigorously.  · Don’t drive the day of the procedure or until your doctor says it’s OK.  · Return to work or other activities when your doctor says you’re ready.  When to call your doctor  Call right away if you notice any of the following symptoms:  · Severe pain or headache  · Loss of bladder or bowel control  · Fever or chills  · Redness or swelling around the injection site       © 3964-0992 [employer]. [address] [place] [address], [place] [address]. All rights reserved. This information is not intended as a substitute for professional medical care. Always follow your healthcare professional's instructions.         Mallampati score-3

## 2022-12-12 NOTE — H&P PST ADULT - HISTORY OF PRESENT ILLNESS
71 y/o female with parkinsons, hypothyrodisn, s/o urostomy tube with right knee pain for 2 months. History of Right knee replacement in 2016. Pain was followed with Dr Oh. MRI revealed patella fracture and was advised surgery. Take tylenol as needed  73 y/o female with parkinsons, hypothyrodisn, s/o urostomy tube with right knee pain for 2 months. History of Right knee replacement in 2016. Pain was followed with Dr Oh. MRI revealed patella fracture and was advised surgery. Take tylenol as needed. Denies any injury/fall. Accompanied with her  for PST in NAD

## 2022-12-13 ENCOUNTER — TRANSCRIPTION ENCOUNTER (OUTPATIENT)
Age: 72
End: 2022-12-13

## 2022-12-13 RX ORDER — ACETAMINOPHEN 500 MG
1000 TABLET ORAL ONCE
Refills: 0 | Status: DISCONTINUED | OUTPATIENT
Start: 2022-12-14 | End: 2022-12-16

## 2022-12-13 RX ORDER — MUPIROCIN 20 MG/G
1 OINTMENT TOPICAL
Refills: 0 | Status: DISCONTINUED | OUTPATIENT
Start: 2022-12-14 | End: 2022-12-16

## 2022-12-13 RX ORDER — TRANEXAMIC ACID 100 MG/ML
1000 INJECTION, SOLUTION INTRAVENOUS ONCE
Refills: 0 | Status: DISCONTINUED | OUTPATIENT
Start: 2022-12-14 | End: 2022-12-16

## 2022-12-13 RX ORDER — CEFAZOLIN SODIUM 1 G
2000 VIAL (EA) INJECTION ONCE
Refills: 0 | Status: DISCONTINUED | OUTPATIENT
Start: 2022-12-14 | End: 2022-12-16

## 2022-12-14 ENCOUNTER — APPOINTMENT (OUTPATIENT)
Dept: ORTHOPEDIC SURGERY | Facility: HOSPITAL | Age: 72
End: 2022-12-14

## 2022-12-14 ENCOUNTER — RESULT REVIEW (OUTPATIENT)
Age: 72
End: 2022-12-14

## 2022-12-14 ENCOUNTER — TRANSCRIPTION ENCOUNTER (OUTPATIENT)
Age: 72
End: 2022-12-14

## 2022-12-14 ENCOUNTER — INPATIENT (INPATIENT)
Facility: HOSPITAL | Age: 72
LOS: 1 days | Discharge: HOME | DRG: 489 | End: 2022-12-16
Attending: ORTHOPAEDIC SURGERY | Admitting: ORTHOPAEDIC SURGERY
Payer: MEDICARE

## 2022-12-14 VITALS
HEIGHT: 63 IN | HEART RATE: 81 BPM | TEMPERATURE: 98 F | RESPIRATION RATE: 17 BRPM | OXYGEN SATURATION: 100 % | WEIGHT: 240.3 LBS | SYSTOLIC BLOOD PRESSURE: 169 MMHG | DIASTOLIC BLOOD PRESSURE: 71 MMHG

## 2022-12-14 DIAGNOSIS — Z98.890 OTHER SPECIFIED POSTPROCEDURAL STATES: Chronic | ICD-10-CM

## 2022-12-14 DIAGNOSIS — Z96.651 PRESENCE OF RIGHT ARTIFICIAL KNEE JOINT: Chronic | ICD-10-CM

## 2022-12-14 DIAGNOSIS — Z96.659 PRESENCE OF UNSPECIFIED ARTIFICIAL KNEE JOINT: Chronic | ICD-10-CM

## 2022-12-14 DIAGNOSIS — Z98.89 OTHER SPECIFIED POSTPROCEDURAL STATES: Chronic | ICD-10-CM

## 2022-12-14 DIAGNOSIS — T84.019D BROKEN INTERNAL JOINT PROSTHESIS, UNSPECIFIED SITE, SUBSEQUENT ENCOUNTER: ICD-10-CM

## 2022-12-14 DIAGNOSIS — M25.561 PAIN IN RIGHT KNEE: Chronic | ICD-10-CM

## 2022-12-14 PROCEDURE — 88300 SURGICAL PATH GROSS: CPT | Mod: 26

## 2022-12-14 PROCEDURE — 27486 REVISE/REPLACE KNEE JOINT: CPT | Mod: RT

## 2022-12-14 PROCEDURE — 88311 DECALCIFY TISSUE: CPT | Mod: 26

## 2022-12-14 PROCEDURE — 88305 TISSUE EXAM BY PATHOLOGIST: CPT | Mod: 26

## 2022-12-14 PROCEDURE — 73560 X-RAY EXAM OF KNEE 1 OR 2: CPT | Mod: 26,RT

## 2022-12-14 DEVICE — BEARING TIB VANGUARD VE PS 63/67X12MM: Type: IMPLANTABLE DEVICE | Status: FUNCTIONAL

## 2022-12-14 DEVICE — PAT 3 PEG 37MM POLY: Type: IMPLANTABLE DEVICE | Status: FUNCTIONAL

## 2022-12-14 DEVICE — BAR TIBIAL LOCKING MODULAR: Type: IMPLANTABLE DEVICE | Status: FUNCTIONAL

## 2022-12-14 DEVICE — BONE FILLER BIOCOMPOSITE STIMULAN RAPID CURE 10CC: Type: IMPLANTABLE DEVICE | Status: FUNCTIONAL

## 2022-12-14 DEVICE — BONE WAX 2.5GM: Type: IMPLANTABLE DEVICE | Status: FUNCTIONAL

## 2022-12-14 DEVICE — IMPLANTABLE DEVICE: Type: IMPLANTABLE DEVICE | Status: FUNCTIONAL

## 2022-12-14 RX ORDER — ASPIRIN/CALCIUM CARB/MAGNESIUM 324 MG
1 TABLET ORAL
Qty: 28 | Refills: 0
Start: 2022-12-14 | End: 2022-12-27

## 2022-12-14 RX ORDER — OXYCODONE HYDROCHLORIDE 5 MG/1
5 TABLET ORAL
Refills: 0 | Status: DISCONTINUED | OUTPATIENT
Start: 2022-12-14 | End: 2022-12-16

## 2022-12-14 RX ORDER — LEVOTHYROXINE SODIUM 125 MCG
125 TABLET ORAL DAILY
Refills: 0 | Status: DISCONTINUED | OUTPATIENT
Start: 2022-12-14 | End: 2022-12-16

## 2022-12-14 RX ORDER — OXYCODONE HYDROCHLORIDE 5 MG/1
5 TABLET ORAL ONCE
Refills: 0 | Status: DISCONTINUED | OUTPATIENT
Start: 2022-12-14 | End: 2022-12-14

## 2022-12-14 RX ORDER — CITALOPRAM 10 MG/1
10 TABLET, FILM COATED ORAL DAILY
Refills: 0 | Status: DISCONTINUED | OUTPATIENT
Start: 2022-12-14 | End: 2022-12-16

## 2022-12-14 RX ORDER — SODIUM CHLORIDE 9 MG/ML
1000 INJECTION, SOLUTION INTRAVENOUS
Refills: 0 | Status: DISCONTINUED | OUTPATIENT
Start: 2022-12-14 | End: 2022-12-16

## 2022-12-14 RX ORDER — APIXABAN 2.5 MG/1
2.5 TABLET, FILM COATED ORAL EVERY 12 HOURS
Refills: 0 | Status: DISCONTINUED | OUTPATIENT
Start: 2022-12-16 | End: 2022-12-16

## 2022-12-14 RX ORDER — DEXAMETHASONE 0.5 MG/5ML
8 ELIXIR ORAL ONCE
Refills: 0 | Status: COMPLETED | OUTPATIENT
Start: 2022-12-15 | End: 2022-12-15

## 2022-12-14 RX ORDER — SENNA PLUS 8.6 MG/1
2 TABLET ORAL AT BEDTIME
Refills: 0 | Status: DISCONTINUED | OUTPATIENT
Start: 2022-12-14 | End: 2022-12-16

## 2022-12-14 RX ORDER — ACETAMINOPHEN 500 MG
1000 TABLET ORAL ONCE
Refills: 0 | Status: COMPLETED | OUTPATIENT
Start: 2022-12-14 | End: 2022-12-14

## 2022-12-14 RX ORDER — HYDROCHLOROTHIAZIDE 25 MG
1 TABLET ORAL
Qty: 0 | Refills: 0 | DISCHARGE

## 2022-12-14 RX ORDER — MUPIROCIN 20 MG/G
1 OINTMENT TOPICAL ONCE
Refills: 0 | Status: DISCONTINUED | OUTPATIENT
Start: 2022-12-14 | End: 2022-12-14

## 2022-12-14 RX ORDER — ACETAMINOPHEN 500 MG
1000 TABLET ORAL EVERY 8 HOURS
Refills: 0 | Status: DISCONTINUED | OUTPATIENT
Start: 2022-12-15 | End: 2022-12-16

## 2022-12-14 RX ORDER — SODIUM CHLORIDE 9 MG/ML
500 INJECTION INTRAMUSCULAR; INTRAVENOUS; SUBCUTANEOUS ONCE
Refills: 0 | Status: COMPLETED | OUTPATIENT
Start: 2022-12-14 | End: 2022-12-14

## 2022-12-14 RX ORDER — HYDROMORPHONE HYDROCHLORIDE 2 MG/ML
0.5 INJECTION INTRAMUSCULAR; INTRAVENOUS; SUBCUTANEOUS
Refills: 0 | Status: DISCONTINUED | OUTPATIENT
Start: 2022-12-14 | End: 2022-12-14

## 2022-12-14 RX ORDER — MAGNESIUM HYDROXIDE 400 MG/1
30 TABLET, CHEWABLE ORAL DAILY
Refills: 0 | Status: DISCONTINUED | OUTPATIENT
Start: 2022-12-14 | End: 2022-12-16

## 2022-12-14 RX ORDER — APIXABAN 2.5 MG/1
2.5 TABLET, FILM COATED ORAL ONCE
Refills: 0 | Status: COMPLETED | OUTPATIENT
Start: 2022-12-15 | End: 2022-12-15

## 2022-12-14 RX ORDER — CELECOXIB 200 MG/1
200 CAPSULE ORAL EVERY 12 HOURS
Refills: 0 | Status: DISCONTINUED | OUTPATIENT
Start: 2022-12-14 | End: 2022-12-16

## 2022-12-14 RX ORDER — SODIUM CHLORIDE 9 MG/ML
1000 INJECTION, SOLUTION INTRAVENOUS
Refills: 0 | Status: DISCONTINUED | OUTPATIENT
Start: 2022-12-14 | End: 2022-12-14

## 2022-12-14 RX ORDER — PRAMIPEXOLE DIHYDROCHLORIDE 0.12 MG/1
0.25 TABLET ORAL
Refills: 0 | Status: DISCONTINUED | OUTPATIENT
Start: 2022-12-14 | End: 2022-12-16

## 2022-12-14 RX ORDER — ONDANSETRON 8 MG/1
4 TABLET, FILM COATED ORAL EVERY 6 HOURS
Refills: 0 | Status: DISCONTINUED | OUTPATIENT
Start: 2022-12-14 | End: 2022-12-16

## 2022-12-14 RX ORDER — CELECOXIB 200 MG/1
1 CAPSULE ORAL
Qty: 56 | Refills: 0
Start: 2022-12-14 | End: 2023-01-10

## 2022-12-14 RX ORDER — OXYCODONE HYDROCHLORIDE 5 MG/1
10 TABLET ORAL
Refills: 0 | Status: DISCONTINUED | OUTPATIENT
Start: 2022-12-14 | End: 2022-12-16

## 2022-12-14 RX ORDER — PANTOPRAZOLE SODIUM 20 MG/1
40 TABLET, DELAYED RELEASE ORAL
Refills: 0 | Status: DISCONTINUED | OUTPATIENT
Start: 2022-12-14 | End: 2022-12-16

## 2022-12-14 RX ORDER — OMEPRAZOLE 10 MG/1
1 CAPSULE, DELAYED RELEASE ORAL
Qty: 28 | Refills: 0
Start: 2022-12-14 | End: 2023-01-10

## 2022-12-14 RX ORDER — CHLORHEXIDINE GLUCONATE 213 G/1000ML
1 SOLUTION TOPICAL ONCE
Refills: 0 | Status: COMPLETED | OUTPATIENT
Start: 2022-12-14 | End: 2022-12-14

## 2022-12-14 RX ORDER — HYDROMORPHONE HYDROCHLORIDE 2 MG/ML
1 INJECTION INTRAMUSCULAR; INTRAVENOUS; SUBCUTANEOUS
Refills: 0 | Status: DISCONTINUED | OUTPATIENT
Start: 2022-12-14 | End: 2022-12-14

## 2022-12-14 RX ORDER — HYDROMORPHONE HYDROCHLORIDE 2 MG/ML
0.5 INJECTION INTRAMUSCULAR; INTRAVENOUS; SUBCUTANEOUS
Refills: 0 | Status: DISCONTINUED | OUTPATIENT
Start: 2022-12-14 | End: 2022-12-16

## 2022-12-14 RX ORDER — CEFAZOLIN SODIUM 1 G
2000 VIAL (EA) INJECTION EVERY 8 HOURS
Refills: 0 | Status: COMPLETED | OUTPATIENT
Start: 2022-12-14 | End: 2022-12-15

## 2022-12-14 RX ORDER — CARBIDOPA AND LEVODOPA 25; 100 MG/1; MG/1
1 TABLET ORAL
Refills: 0 | Status: DISCONTINUED | OUTPATIENT
Start: 2022-12-14 | End: 2022-12-16

## 2022-12-14 RX ORDER — APREPITANT 80 MG/1
40 CAPSULE ORAL ONCE
Refills: 0 | Status: COMPLETED | OUTPATIENT
Start: 2022-12-14 | End: 2022-12-14

## 2022-12-14 RX ORDER — BRIMONIDINE TARTRATE 2 MG/MG
1 SOLUTION/ DROPS OPHTHALMIC
Refills: 0 | Status: DISCONTINUED | OUTPATIENT
Start: 2022-12-14 | End: 2022-12-16

## 2022-12-14 RX ORDER — ONDANSETRON 8 MG/1
4 TABLET, FILM COATED ORAL ONCE
Refills: 0 | Status: DISCONTINUED | OUTPATIENT
Start: 2022-12-14 | End: 2022-12-14

## 2022-12-14 RX ORDER — POLYETHYLENE GLYCOL 3350 17 G/17G
17 POWDER, FOR SOLUTION ORAL AT BEDTIME
Refills: 0 | Status: DISCONTINUED | OUTPATIENT
Start: 2022-12-14 | End: 2022-12-16

## 2022-12-14 RX ADMIN — SODIUM CHLORIDE 500 MILLILITER(S): 9 INJECTION INTRAMUSCULAR; INTRAVENOUS; SUBCUTANEOUS at 21:57

## 2022-12-14 RX ADMIN — Medication 1000 MILLIGRAM(S): at 21:45

## 2022-12-14 RX ADMIN — SODIUM CHLORIDE 75 MILLILITER(S): 9 INJECTION, SOLUTION INTRAVENOUS at 19:14

## 2022-12-14 RX ADMIN — CELECOXIB 200 MILLIGRAM(S): 200 CAPSULE ORAL at 21:40

## 2022-12-14 RX ADMIN — APREPITANT 40 MILLIGRAM(S): 80 CAPSULE ORAL at 11:45

## 2022-12-14 RX ADMIN — CARBIDOPA AND LEVODOPA 1 TABLET(S): 25; 100 TABLET ORAL at 21:44

## 2022-12-14 RX ADMIN — CHLORHEXIDINE GLUCONATE 1 APPLICATION(S): 213 SOLUTION TOPICAL at 11:45

## 2022-12-14 RX ADMIN — SODIUM CHLORIDE 500 MILLILITER(S): 9 INJECTION INTRAMUSCULAR; INTRAVENOUS; SUBCUTANEOUS at 19:14

## 2022-12-14 RX ADMIN — Medication 100 MILLIGRAM(S): at 22:58

## 2022-12-14 RX ADMIN — CELECOXIB 200 MILLIGRAM(S): 200 CAPSULE ORAL at 21:45

## 2022-12-14 RX ADMIN — PRAMIPEXOLE DIHYDROCHLORIDE 0.25 MILLIGRAM(S): 0.12 TABLET ORAL at 21:47

## 2022-12-14 RX ADMIN — Medication 400 MILLIGRAM(S): at 21:41

## 2022-12-14 NOTE — PHYSICAL THERAPY INITIAL EVALUATION ADULT - ADDITIONAL COMMENTS
Pt lives with  in a private home, there is one step to enter and no stairs to navigate within the home. Pt has a tub shower. PTA pt was independent with ADLs and ambulation with RW. Pt reported she used the RW because she was afraid of falling. Pt owns a cane, RW and raised toilet seat.

## 2022-12-14 NOTE — DISCHARGE NOTE PROVIDER - HOSPITAL COURSE
This patient was admitted to [facility] with a history of failed right TKR.  Patient underwent Pre-Surgical Testing and was medically cleared to undergo elective procedure. Patient underwent Revision right TKR by Dr. Soy Oh on 12/14/22. Procedure was well tolerated.  No operative or karis-operative complications arose during patient's hospital course.  Patient received antibiotic according to SCIP guidelines for infection prevention.  Eliquis 2.5mg q 12 h was given for DVT prophylaxis, in addition to the use of SCDs.  Anesthesia, Medical Hospitalist, Physical Therapy and Occupational Therapy were consulted. Patient is stable for discharge with a good prognosis.  Appropriate discharge instructions and medications are provided in this document. This patient was admitted to [facility] with a history of failed right TKR.  Patient underwent Pre-Surgical Testing and was medically cleared to undergo elective procedure. Patient underwent Revision right TKR (patella button and liner exchange) by Dr. Soy Oh on 12/14/22. Procedure was well tolerated.  No operative or karis-operative complications arose during patient's hospital course.  Patient received antibiotic according to SCIP guidelines for infection prevention (ancef).  Eliquis 2.5mg q 12 h, along w/ bilat venodynes was given for DVT prophylaxis (caprini 11)  Anesthesia, Medical Hospitalist, Physical Therapy and Occupational Therapy were consulted. Patient is stable for discharge with a good prognosis.  Appropriate discharge instructions and medications are provided in this document.

## 2022-12-14 NOTE — PHYSICAL THERAPY INITIAL EVALUATION ADULT - PERTINENT HX OF CURRENT PROBLEM, REHAB EVAL
Pt is a 73 y/o female with a right knee broken internal joint prosthesis. Pt is s/p revision of right total knee replacement on 12/14/22.

## 2022-12-14 NOTE — BRIEF OPERATIVE NOTE - NSICDXBRIEFPROCEDURE_GEN_ALL_CORE_FT
PROCEDURES:  Revision of total arthroplasty of knee with replacement of polyethylene liner 14-Dec-2022 17:32:20  Rosendo Carnes  Revision of patellar button 14-Dec-2022 17:32:34  Rosendo Carnes

## 2022-12-14 NOTE — DISCHARGE NOTE PROVIDER - CARE PROVIDER_API CALL
Soy Oh)  Orthopedics  [address], [address]  [place] [address]  Phone: [phone]  Fax: [phone]  Established Patient  Scheduled Appointment: 12/27/2022 11:30 AM

## 2022-12-14 NOTE — PHYSICAL THERAPY INITIAL EVALUATION ADULT - LEVEL OF INDEPENDENCE: GAIT, REHAB EVAL
pt unable to ambulate due to decreased sensation/proprioception and increased numbness/weakness in the R LE due to surgery/unable to perform

## 2022-12-14 NOTE — PHYSICAL THERAPY INITIAL EVALUATION ADULT - RANGE OF MOTION EXAMINATION, REHAB EVAL
R knee not assessed due to surgery/knee immobilizer/bilateral upper extremity ROM was WFL (within functional limits)/bilateral lower extremity ROM was WFL (within functional limits)

## 2022-12-14 NOTE — DISCHARGE NOTE PROVIDER - NSDCCPTREATMENT_GEN_ALL_CORE_FT
PRINCIPAL PROCEDURE  Procedure: Revision of total arthroplasty of knee with replacement of polyethylene liner  Findings and Treatment: failed TKR      SECONDARY PROCEDURE  Procedure: Revision of patellar button  Findings and Treatment:      PRINCIPAL PROCEDURE  Procedure: Revision of total arthroplasty of knee with replacement of polyethylene liner  Findings and Treatment: loose patella button      SECONDARY PROCEDURE  Procedure: Revision of patellar button  Findings and Treatment:

## 2022-12-14 NOTE — DISCHARGE NOTE PROVIDER - NSDCMRMEDTOKEN_GEN_ALL_CORE_FT
acetaminophen 325 mg oral tablet: 2 tab(s) orally every 6 hours, As needed, Temp greater or equal to 38C (100.4F), Mild Pain (1 - 3), Moderate Pain (4 - 6)  brimonidine 0.2% ophthalmic solution: 1 drop(s) to each affected eye 2 times a day  carbidopa-levodopa 25 mg-100 mg oral tablet: 1 tab(s) orally 4 times a day  citalopram 10 mg oral tablet: 1 tab(s) orally once a day  levothyroxine 125 mcg (0.125 mg) oral tablet: 1 tab(s) orally once a day  mupirocin 2% topical ointment: Apply topically to affected area 2 times a day  to nostrils  pramipexole 0.25 mg oral tablet: 2 tab(s) orally 4 times a day  Zetia 10 mg oral tablet: 1 tab(s) orally once a day   acetaminophen 325 mg oral tablet: 2 tab(s) orally every 6 hours, As needed, Temp greater or equal to 38C (100.4F), Mild Pain (1 - 3), Moderate Pain (4 - 6)  Aspirin Enteric Coated 81 mg oral delayed release tablet: Start1 tab orally every 12 hours for 14 days, once Eliquis is completed. Take aspirin 2 hours before Celebrex.  Darrel Brace: Dx: s/p Revsion Right TKR  WBAT   Keep [place] locked at zero  brimonidine 0.2% ophthalmic solution: 1 drop(s) to each affected eye 2 times a day  carbidopa-levodopa 25 mg-100 mg oral tablet: 1 tab(s) orally 4 times a day  cefadroxil 500 mg oral capsule: 1 cap(s) orally every 12 hours   celecoxib 200 mg oral capsule: 1 cap orally every 12 hours.   citalopram 10 mg oral tablet: 1 tab(s) orally once a day  levothyroxine 125 mcg (0.125 mg) oral tablet: 1 tab(s) orally once a day  mupirocin 2% topical ointment: Apply topically to affected area 2 times a day  to nostrils  omeprazole 20 mg oral delayed release capsule: 1 cap(s) orally once a day   oxyCODONE 5 mg oral tablet: 1-2 tab(s) orally every 4 hours, As Needed -for moderate to severe pain MDD:6  Reference #: [id number]  pramipexole 0.25 mg oral tablet: 2 tab(s) orally 4 times a day  Zetia 10 mg oral tablet: 1 tab(s) orally once a day   acetaminophen 500 mg oral tablet: 2 tab(s) orally every 8 hours  apixaban 2.5 mg oral tablet: 1 tab(s) orally every 12 hours for 14 days.  Then start ecotrin 81 mg every 12 hrs for 14 days.  Aspirin Enteric Coated 81 mg oral delayed release tablet: Start1 tab orally every 12 hours for 14 days, once Eliquis is completed. Take aspirin 2 hours before Celebrex.  [place] Brace: Dx: s/p Revsion Right TKR  WBAT   Keep Darrel locked at zero  brimonidine 0.2% ophthalmic solution: 1 drop(s) to each affected eye 2 times a day  carbidopa-levodopa 25 mg-100 mg oral tablet: 1 tab(s) orally 4 times a day  cefadroxil 500 mg oral capsule: 1 cap(s) orally every 12 hours   celecoxib 200 mg oral capsule: 1 cap orally every 12 hours.   citalopram 10 mg oral tablet: 1 tab(s) orally once a day  levothyroxine 125 mcg (0.125 mg) oral tablet: 1 tab(s) orally once a day  mupirocin 2% topical ointment: Apply topically to affected area 2 times a day  to nostrils until 12/17  omeprazole 20 mg oral delayed release capsule: 1 cap(s) orally once a day   oxyCODONE 5 mg oral tablet: 1-2 tab(s) orally every 4 hours, As Needed -for moderate to severe pain MDD:6  Reference #: [id number]  polyethylene glycol 3350 oral powder for reconstitution: 17 gram(s) orally once a day (at bedtime)  pramipexole 0.25 mg oral tablet: 2 tab(s) orally 4 times a day  senna leaf extract oral tablet: 2 tab(s) orally once a day (at bedtime)  Zetia 10 mg oral tablet: 1 tab(s) orally once a day   acetaminophen 500 mg oral tablet: 2 tab(s) orally every 8 hours  Aspirin Enteric Coated 81 mg oral delayed release tablet: Start1 tab orally every 12 hours for 14 days, once Eliquis is completed. Take aspirin 2 hours before Celebrex.  Darrel Brace: Dx: s/p Revsion Right TKR  WBAT   Keep [place] locked at zero  brimonidine 0.2% ophthalmic solution: 1 drop(s) to each affected eye 2 times a day  carbidopa-levodopa 25 mg-100 mg oral tablet: 1 tab(s) orally 4 times a day  cefadroxil 500 mg oral capsule: 1 cap(s) orally every 12 hours   celecoxib 200 mg oral capsule: 1 cap orally every 12 hours.   citalopram 10 mg oral tablet: 1 tab(s) orally once a day  Eliquis 2.5 mg oral tablet: 1 tab orally every 12 hours for 14 days total post-op. Change to Aspirin 81mg every 12 hours for 14 days, once Eliquis is completed.   levothyroxine 125 mcg (0.125 mg) oral tablet: 1 tab(s) orally once a day  mupirocin 2% topical ointment: Apply topically to affected area 2 times a day  to nostrils until 12/17  omeprazole 20 mg oral delayed release capsule: 1 cap(s) orally once a day   oxyCODONE 5 mg oral tablet: 1-2 tab(s) orally every 4 hours, As Needed -for moderate to severe pain MDD:6  Reference #: [id number]  polyethylene glycol 3350 oral powder for reconstitution: 17 gram(s) orally once a day (at bedtime)  pramipexole 0.25 mg oral tablet: 2 tab(s) orally 4 times a day  senna leaf extract oral tablet: 2 tab(s) orally once a day (at bedtime)  Zetia 10 mg oral tablet: 1 tab(s) orally once a day

## 2022-12-14 NOTE — DISCHARGE NOTE PROVIDER - PROVIDER TOKENS
PROVIDER:[TOKEN:[[id number]],SCHEDULEDAPPT:[12/27/2022],SCHEDULEDAPPTTIME:[11:30 AM],ESTABLISHEDPATIENT:[T]]

## 2022-12-14 NOTE — PATIENT PROFILE ADULT - FALL HARM RISK - HARM RISK INTERVENTIONS

## 2022-12-14 NOTE — PATIENT PROFILE ADULT - FALL HARM RISK - DEVICES
Caller: patient  Call back number: [phone]  Patient's doctor: Dr Kajal Dolan    Patient called asking to speak to you about the cyst on his left shoulder  He states that he had an MRI done on 12/14 that he is sending to the office  I offered him an appointment but he would not schedule, he states he just needs to ask a question about the cyst  He would like a call back  Cane/Walker

## 2022-12-14 NOTE — DISCHARGE NOTE PROVIDER - NSDCCPCAREPLAN_GEN_ALL_CORE_FT
PRINCIPAL DISCHARGE DIAGNOSIS  Diagnosis: BROKEN INTERNAL JOINT PROSTHESIS, UNSP SITE, SUBS ENCNTR  Assessment and Plan of Treatment: For your total knee replacement:  Physical Therapy/Occupational Therapy for: ambulation, transfers, stairs, ADL's (activities of daily living), range of motion exercises, and isometrics  -Activity  • Weight Bearing as tolerated with rolling walker with knee in full extension locked in [place] brace.  • Cryo/cuff 20 minutes several times daily with at least a 1 hour break in between icing sessions  • Take short, frequent walks increasing the distance that you walk each day as tolerated.  • Continue the exercises taught to you by your physical therapist.  • No driving until cleared by the doctor.  • No tub baths, hot tubs, or swimming pools until instructed by your doctor.  • Do not squat down on the floor.  • Do not kneel or twist your knee.  Silverlon dressing is waterproof.  You may shower, but do not aim shower stream at surgical site. Pat dry after shower.   Remove Silverlon dressing on postop day #7. May shower after Silverlon removal.   You have a Prineo dressing over your incision (tape and glue).  Keep incision clean. DO NOT APPLY ANYTHING to incision site (salves/ointments/creams).  May shower.  Do not scrub incision site. Pat dry after shower. Prineo dressing will be removed at surgeon's office during first post-op appointment, 2 weeks after surgery.       PRINCIPAL DISCHARGE DIAGNOSIS  Diagnosis: BROKEN INTERNAL JOINT PROSTHESIS, UNSP SITE, SUBS ENCNTR  Assessment and Plan of Treatment: For your total knee replacement:  Physical Therapy/Occupational Therapy for: ambulation, transfers, stairs, ADL's (activities of daily living), range of motion exercises, and isometrics  -Activity  • Weight Bearing as tolerated with rolling walker with knee in full extension locked in [place] brace.  • Take short, frequent walks increasing the distance that you walk each day as tolerated.  • Continue the exercises taught to you by your physical therapist.  • No driving until cleared by the doctor.  • No tub baths, hot tubs, or swimming pools until instructed by your doctor.  • Do not squat down on the floor.  • Do not kneel or twist your knee.  Silverlon dressing is waterproof.  You may shower, but do not aim shower stream at surgical site. Pat dry after shower.   Remove Silverlon dressing on postop day #7. May shower after Silverlon removal.   You have a Prineo dressing over your incision (tape and glue).  Keep incision clean. DO NOT APPLY ANYTHING to incision site (salves/ointments/creams).  May shower.  Do not scrub incision site. Pat dry after shower. Prineo dressing will be removed at surgeon's office during first post-op appointment, 2 weeks after surgery.  Use cryocuff for 20 minute sessions w/ at least 1 hr between sessions.  Use a towel or washcloth under cuff; don't place directly on the skin.

## 2022-12-14 NOTE — DISCHARGE NOTE PROVIDER - NSDCFUSCHEDAPPT_GEN_ALL_CORE_FT
Soy Oh  [address]  Scheduled Appointment: 12/27/2022    Soy Oh  [facility]  SYOP PAT-Pre-Surgical Testing  Scheduled Appointment: 01/10/2023    Soy Oh  [address]  Scheduled Appointment: 01/24/2023

## 2022-12-15 LAB
ANION GAP SERPL CALC-SCNC: 8 MMOL/L — SIGNIFICANT CHANGE UP (ref 5–17)
BUN SERPL-MCNC: 20 MG/DL — SIGNIFICANT CHANGE UP (ref 7–23)
CALCIUM SERPL-MCNC: 9 MG/DL — SIGNIFICANT CHANGE UP (ref 8.4–10.5)
CHLORIDE SERPL-SCNC: 105 MMOL/L — SIGNIFICANT CHANGE UP (ref 96–108)
CO2 SERPL-SCNC: 27 MMOL/L — SIGNIFICANT CHANGE UP (ref 22–31)
CREAT SERPL-MCNC: 1.14 MG/DL — SIGNIFICANT CHANGE UP (ref 0.5–1.3)
EGFR: 51 ML/MIN/1.73M2 — LOW
GLUCOSE SERPL-MCNC: 128 MG/DL — HIGH (ref 70–99)
GRAM STN FLD: SIGNIFICANT CHANGE UP
HCT VFR BLD CALC: 35.5 % — SIGNIFICANT CHANGE UP (ref 34.5–45)
HGB BLD-MCNC: 11.5 G/DL — SIGNIFICANT CHANGE UP (ref 11.5–15.5)
MCHC RBC-ENTMCNC: 32.4 GM/DL — SIGNIFICANT CHANGE UP (ref 32–36)
MCHC RBC-ENTMCNC: 32.4 PG — SIGNIFICANT CHANGE UP (ref 27–34)
MCV RBC AUTO: 100 FL — SIGNIFICANT CHANGE UP (ref 80–100)
NRBC # BLD: 0 /100 WBCS — SIGNIFICANT CHANGE UP (ref 0–0)
PLATELET # BLD AUTO: 235 K/UL — SIGNIFICANT CHANGE UP (ref 150–400)
POTASSIUM SERPL-MCNC: 4.6 MMOL/L — SIGNIFICANT CHANGE UP (ref 3.5–5.3)
POTASSIUM SERPL-SCNC: 4.6 MMOL/L — SIGNIFICANT CHANGE UP (ref 3.5–5.3)
RBC # BLD: 3.55 M/UL — LOW (ref 3.8–5.2)
RBC # FLD: 13.4 % — SIGNIFICANT CHANGE UP (ref 10.3–14.5)
SODIUM SERPL-SCNC: 140 MMOL/L — SIGNIFICANT CHANGE UP (ref 135–145)
SPECIMEN SOURCE: SIGNIFICANT CHANGE UP
WBC # BLD: 8.34 K/UL — SIGNIFICANT CHANGE UP (ref 3.8–10.5)
WBC # FLD AUTO: 8.34 K/UL — SIGNIFICANT CHANGE UP (ref 3.8–10.5)

## 2022-12-15 PROCEDURE — 99222 1ST HOSP IP/OBS MODERATE 55: CPT

## 2022-12-15 RX ORDER — APIXABAN 2.5 MG/1
1 TABLET, FILM COATED ORAL
Qty: 0 | Refills: 0 | DISCHARGE
Start: 2022-12-15

## 2022-12-15 RX ORDER — POLYETHYLENE GLYCOL 3350 17 G/17G
17 POWDER, FOR SOLUTION ORAL
Qty: 0 | Refills: 0 | DISCHARGE
Start: 2022-12-15

## 2022-12-15 RX ORDER — MUPIROCIN 20 MG/G
1 OINTMENT TOPICAL
Qty: 1 | Refills: 0
Start: 2022-12-15 | End: 2022-12-19

## 2022-12-15 RX ORDER — SENNA PLUS 8.6 MG/1
2 TABLET ORAL
Qty: 0 | Refills: 0 | DISCHARGE
Start: 2022-12-15

## 2022-12-15 RX ORDER — OXYCODONE HYDROCHLORIDE 5 MG/1
1 TABLET ORAL
Qty: 42 | Refills: 0
Start: 2022-12-15 | End: 2022-12-21

## 2022-12-15 RX ORDER — ACETAMINOPHEN 500 MG
2 TABLET ORAL
Qty: 0 | Refills: 0 | DISCHARGE
Start: 2022-12-15

## 2022-12-15 RX ADMIN — MUPIROCIN 1 APPLICATION(S): 20 OINTMENT TOPICAL at 17:42

## 2022-12-15 RX ADMIN — OXYCODONE HYDROCHLORIDE 5 MILLIGRAM(S): 5 TABLET ORAL at 00:56

## 2022-12-15 RX ADMIN — Medication 1000 MILLIGRAM(S): at 14:36

## 2022-12-15 RX ADMIN — Medication 100 MILLIGRAM(S): at 06:32

## 2022-12-15 RX ADMIN — CARBIDOPA AND LEVODOPA 1 TABLET(S): 25; 100 TABLET ORAL at 06:03

## 2022-12-15 RX ADMIN — CARBIDOPA AND LEVODOPA 1 TABLET(S): 25; 100 TABLET ORAL at 23:31

## 2022-12-15 RX ADMIN — PRAMIPEXOLE DIHYDROCHLORIDE 0.25 MILLIGRAM(S): 0.12 TABLET ORAL at 06:03

## 2022-12-15 RX ADMIN — OXYCODONE HYDROCHLORIDE 10 MILLIGRAM(S): 5 TABLET ORAL at 21:45

## 2022-12-15 RX ADMIN — BRIMONIDINE TARTRATE 1 DROP(S): 2 SOLUTION/ DROPS OPHTHALMIC at 17:41

## 2022-12-15 RX ADMIN — BRIMONIDINE TARTRATE 1 DROP(S): 2 SOLUTION/ DROPS OPHTHALMIC at 06:04

## 2022-12-15 RX ADMIN — Medication 1000 MILLIGRAM(S): at 06:12

## 2022-12-15 RX ADMIN — CELECOXIB 200 MILLIGRAM(S): 200 CAPSULE ORAL at 21:35

## 2022-12-15 RX ADMIN — Medication 1000 MILLIGRAM(S): at 21:25

## 2022-12-15 RX ADMIN — CELECOXIB 200 MILLIGRAM(S): 200 CAPSULE ORAL at 08:59

## 2022-12-15 RX ADMIN — SENNA PLUS 2 TABLET(S): 8.6 TABLET ORAL at 21:26

## 2022-12-15 RX ADMIN — CARBIDOPA AND LEVODOPA 1 TABLET(S): 25; 100 TABLET ORAL at 17:41

## 2022-12-15 RX ADMIN — APIXABAN 2.5 MILLIGRAM(S): 2.5 TABLET, FILM COATED ORAL at 15:24

## 2022-12-15 RX ADMIN — OXYCODONE HYDROCHLORIDE 5 MILLIGRAM(S): 5 TABLET ORAL at 01:26

## 2022-12-15 RX ADMIN — PRAMIPEXOLE DIHYDROCHLORIDE 0.25 MILLIGRAM(S): 0.12 TABLET ORAL at 11:30

## 2022-12-15 RX ADMIN — Medication 1000 MILLIGRAM(S): at 06:03

## 2022-12-15 RX ADMIN — PRAMIPEXOLE DIHYDROCHLORIDE 0.25 MILLIGRAM(S): 0.12 TABLET ORAL at 17:41

## 2022-12-15 RX ADMIN — OXYCODONE HYDROCHLORIDE 10 MILLIGRAM(S): 5 TABLET ORAL at 21:27

## 2022-12-15 RX ADMIN — PANTOPRAZOLE SODIUM 40 MILLIGRAM(S): 20 TABLET, DELAYED RELEASE ORAL at 06:04

## 2022-12-15 RX ADMIN — Medication 1000 MILLIGRAM(S): at 21:35

## 2022-12-15 RX ADMIN — MUPIROCIN 1 APPLICATION(S): 20 OINTMENT TOPICAL at 06:04

## 2022-12-15 RX ADMIN — CELECOXIB 200 MILLIGRAM(S): 200 CAPSULE ORAL at 11:35

## 2022-12-15 RX ADMIN — POLYETHYLENE GLYCOL 3350 17 GRAM(S): 17 POWDER, FOR SOLUTION ORAL at 21:25

## 2022-12-15 RX ADMIN — CITALOPRAM 10 MILLIGRAM(S): 10 TABLET, FILM COATED ORAL at 11:22

## 2022-12-15 RX ADMIN — CELECOXIB 200 MILLIGRAM(S): 200 CAPSULE ORAL at 21:26

## 2022-12-15 RX ADMIN — Medication 101.6 MILLIGRAM(S): at 06:02

## 2022-12-15 RX ADMIN — CARBIDOPA AND LEVODOPA 1 TABLET(S): 25; 100 TABLET ORAL at 11:22

## 2022-12-15 RX ADMIN — Medication 125 MICROGRAM(S): at 06:04

## 2022-12-15 RX ADMIN — Medication 500 MILLIGRAM(S): at 14:36

## 2022-12-15 RX ADMIN — APIXABAN 2.5 MILLIGRAM(S): 2.5 TABLET, FILM COATED ORAL at 23:31

## 2022-12-15 RX ADMIN — Medication 1000 MILLIGRAM(S): at 16:06

## 2022-12-15 NOTE — CONSULT NOTE ADULT - ASSESSMENT
72F with parkinson's, hypothyroid, s/p urostomy tube with right knee pain for 2 months S/p Revision of total arthroplasty of knee with replacement of polyethylene liner on 12/14/22    #TKR s/p revision POD1  - pain control and DVT ppx per primary team  - PT/OT  - incentive spirometery  - bowel regimen  - check basic labs    #Parkinson's  - continue home meds Sinemet and pramipexole    Hypothyroid  - resume home synthroid

## 2022-12-15 NOTE — OCCUPATIONAL THERAPY INITIAL EVALUATION ADULT - PATIENT PROFILE REVIEW, REHAB EVAL
yes
PROBLEM DIAGNOSES  Problem: Intra-abdominal and pelvic swelling, mass and lump, unspecified site  Assessment and Plan: Patient is scheduled for Exploratory laparotomy, resection abdominal mass, possible bowel resection scheduled on 12/13/2019.   Preop instructions,  surgical scrub provided. Pt stated understanding.  Pt instructed to take own GI prophylaxis. Teach back method utilized.   Pending Cardiology evaluation per PST- history of CAD-1 cardiac stent , history of Hypertension. Patient reports has an appoinemnt on 12/4/2019.  Pending current EKG.   Asprin plan: Patient instructed to continue low dose Asprin- history of stent.       Problem: Sleep apnea  Assessment and Plan: OR booking notified.   ( History of Sleep Apnea- history of Sleep apnea surgery completed, never re- tested)     Problem: Diabetes mellitus  Assessment and Plan: OR booking notified   Patient instructed last dose of Metformin is on 12/12/19 Am -Hold 24 hours prior to surgery.

## 2022-12-15 NOTE — OCCUPATIONAL THERAPY INITIAL EVALUATION ADULT - TRANSFER TRAINING, PT EVAL
<--- Click to Launch ICDx for PreOp, PostOp and Procedure
Patient will transfer to toilet with DME as needed with minimal assistance with in 2-3 sessions.

## 2022-12-15 NOTE — CHART NOTE - NSCHARTNOTEFT_GEN_A_CORE
Order received from ortho pa to fit patient with louise knee brace set 0-30 per Dr Oh.  Knee immobilizer removed and replaced with louise brace.  Pt instructed to jose and lili  Written instructions provided as well as office contact information.  Follow up if needed      Sivakumar Gipson [employer]  [phone]  [facility]

## 2022-12-15 NOTE — CONSULT NOTE ADULT - SUBJECTIVE AND OBJECTIVE BOX
HPI: 71 y/o female with parkinson's, hypothyroid, s/p urostomy tube with right knee pain for 2 months. History of Right knee replacement in 2016. Pain was followed with Dr Oh. MRI revealed patella fracture and was advised surgery. Take tylenol as needed. Denies any injury/fall.    S/p Revision of total arthroplasty of knee with replacement of polyethylene liner on     REVIEW OF SYSTEMS:  CONSTITUTIONAL: No fever, weight loss, or fatigue  EYES: No eye pain, visual disturbances, or discharge  ENMT:  No difficulty hearing, tinnitus, vertigo; No sinus or throat pain  NECK: No pain or stiffness  RESPIRATORY: No cough, wheezing, chills or hemoptysis; No shortness of breath  CARDIOVASCULAR: No chest pain, palpitations, dizziness, or leg swelling  GASTROINTESTINAL: No abdominal or epigastric pain. No nausea, vomiting, or hematemesis; No diarrhea or constipation. No melena or hematochezia.  GENITOURINARY: No dysuria, frequency, hematuria, or incontinence  NEUROLOGICAL: No headaches, memory loss, loss of strength, numbness, or tremors  MUSCULOSKELETAL: R knee pain      PAST MEDICAL & SURGICAL HISTORY:  Hyperlipidemia      Parkinsons disease  diagnosed 5 years ago      Hypothyroid      Osteoarthritis      Morbid obesity      Chronic venous insufficiency      2019 novel coronavirus disease (COVID-19)      Morbid obesity with BMI of 40.0-44.9, adult      S/P   x 2      S/P knee replacement  bilateral 1 week apart       S/P knee surgery  s/p cement spacer due to bacterial infection 2017      History of urostomy      H/O total knee replacement, right      Right knee pain          SOCIAL HISTORY:  Tobacco; EtOH; Illicit Drugs    Allergies    No Known Allergies    Intolerances        MEDICATIONS  (STANDING):  acetaminophen     Tablet .. 1000 milliGRAM(s) Oral every 8 hours  acetaminophen   IVPB .. 1000 milliGRAM(s) IV Intermittent once  apixaban 2.5 milliGRAM(s) Oral once  apixaban 2.5 milliGRAM(s) Oral once  brimonidine 0.2% Ophthalmic Solution 1 Drop(s) Both EYES two times a day  carbidopa/levodopa  25/100 1 Tablet(s) Oral four times a day  cefadroxil 500 milliGRAM(s) Oral every 12 hours  ceFAZolin   IVPB 2000 milliGRAM(s) IV Intermittent once  celecoxib 200 milliGRAM(s) Oral every 12 hours  citalopram 10 milliGRAM(s) Oral daily  lactated ringers. 1000 milliLiter(s) (125 mL/Hr) IV Continuous <Continuous>  levothyroxine 125 MICROGram(s) Oral daily  mupirocin 2% Nasal 1 Application(s) Both Nostrils two times a day  pantoprazole    Tablet 40 milliGRAM(s) Oral before breakfast  polyethylene glycol 3350 17 Gram(s) Oral at bedtime  pramipexole 0.25 milliGRAM(s) Oral four times a day  senna 2 Tablet(s) Oral at bedtime  tranexamic acid IVPB 1000 milliGRAM(s) IV Intermittent once  tranexamic acid IVPB 1000 milliGRAM(s) IV Intermittent once    MEDICATIONS  (PRN):  aluminum hydroxide/magnesium hydroxide/simethicone Suspension 30 milliLiter(s) Oral four times a day PRN Indigestion  HYDROmorphone  Injectable 0.5 milliGRAM(s) IV Push every 3 hours PRN Breakthrough pain  magnesium hydroxide Suspension 30 milliLiter(s) Oral daily PRN Constipation  ondansetron Injectable 4 milliGRAM(s) IV Push every 6 hours PRN Nausea and/or Vomiting  oxyCODONE    IR 5 milliGRAM(s) Oral every 3 hours PRN Moderate Pain (4 - 6)  oxyCODONE    IR 10 milliGRAM(s) Oral every 3 hours PRN Severe Pain (7 - 10)      FAMILY HISTORY:  Family history of heart disease (Father, Mother)        Vital Signs Last 24 Hrs  T(C): 36.6 (15 Dec 2022 08:53), Max: 36.7 (14 Dec 2022 11:35)  T(F): 97.8 (15 Dec 2022 08:53), Max: 98.1 (14 Dec 2022 11:35)  HR: 82 (15 Dec 2022 08:53) (74 - 85)  BP: 159/84 (15 Dec 2022 08:53) (135/77 - 169/71)  BP(mean): --  RR: 18 (15 Dec 2022 08:53) (16 - 20)  SpO2: 98% (15 Dec 2022 08:53) (96% - 100%)    Parameters below as of 15 Dec 2022 03:24  Patient On (Oxygen Delivery Method): room air        PHYSICAL EXAM:    GENERAL: NAD, well-developed  HEAD:  Atraumatic, Normocephalic  EYES: EOMI, PERRLA, conjunctiva and sclera clear  ENMT: No tonsillar erythema, exudates, or enlargement; Moist mucous membranes  NECK: Supple, No JVD, Normal thyroid  NERVOUS SYSTEM:  Alert & Oriented X3, Good concentration  CHEST/LUNG: Clear to auscultation bilaterally; No rales, rhonchi, wheezing, or rubs  HEART: Regular rate and rhythm; No murmurs, rubs, or gallops  ABDOMEN: Soft, Nontender, Nondistended; Bowel sounds present  EXTREMITIES:  2+ Peripheral Pulses, No clubbing, cyanosis, or edema  MUSCULOSKELETAL: R knee in dressing and brace  : urostomy bag in place      LABS:                        11.5   8.34  )-----------( 235      ( 15 Dec 2022 08:40 )             35.5     12-15    140  |  105  |  20  ----------------------------<  128<H>  4.6   |  27  |  1.14    Ca    9.0      15 Dec 2022 08:40

## 2022-12-15 NOTE — OCCUPATIONAL THERAPY INITIAL EVALUATION ADULT - PATIENT/FAMILY/SIGNIFICANT OTHER GOALS STATEMENT, OT EVAL
Delivery Note    Obstetrician:  Ahsan Beckett MD    Assistant: none    Pre-Delivery Diagnosis: Term pregnancy, Spontaneous labor and Single fetus    Post-Delivery Diagnosis: Living  infant(s) and Male    Intrapartum Event: Multiple variable decelerations    Procedure: Vacuum assisted delivery    Epidural: YES    Monitor:  Fetal Heart Tones - External and Uterine Contractions - External    Indications for instrumental delivery: none, fetal intolerance of labor, uterine inertia/fatigue or relative cephalopelvic disproportion    Estimated Blood Loss:  QBL was 250cc per nurse    Episiotomy: none    Laceration(s):  2nd degree    Laceration(s) repair: YES    Presentation: Cephalic    Fetal Description: mejía    Fetal Position: Left Occiput Anterior    BABY INFORMATION  NAME:   Information for the patient's :  Mary Jane Duarte [[id number]]   [facility]: female  DATE AND TIME OF BIRTH:   Information for the patient's :  Mary Jane Duarte [[id number]]   3/31/2022   at   Information for the patient's :  Mary Jane Duarte [[id number]]   [address] [place] [address]:   Information for the patient's :  Mary Jane Duarte [[id number]]       and   Information for the patient's :  Mary Jane Duarte [[id number]]      .   APGARS:  Information for the patient's :  Mary Jane Duarte [[id number]]         Information for the patient's :  Mary Jane Duarte [[id number]]          Umbilical Cord: 3 vessels present and Cord blood sent to lab for type, Rh, and Naomi' test    Specimens: na           Complications:  maternal temperature           Lab Results   Component Value Date/Time    ABO/Rh(D) O POSITIVE 2020 07:48 PM    Antibody screen NEG 2020 07:48 PM    Rubella, External Immune 10/11/2019    GrBStrep, External Negative 2020            Attending Attestation: I was present and scrubbed for the entire procedure       Delivery Summary    Patient: Caroline Dooley MRN: [id number]  SSN: [id number]    YOB: 1998  Age: 19 y.o. Sex: female        Information for the patient's :  Fany Blanco [[id number]]       Labor Events:    Labor: No    Steroids: None   Cervical Ripening Date/Time:       Cervical Ripening Type: None   Antibiotics During Labor: No   Rupture Identifier:      Rupture Date/Time: 3/31/2020 12:50 AM   Rupture Type: SROM   Amniotic Fluid Volume:      Amniotic Fluid Description: Meconium    Amniotic Fluid Odor: None    Induction:         Induction Date/Time: 3/31/2020 5:00 AM    Indications for Induction: Elective    Augmentation: Oxytocin   Augmentation Date/Time: 3/31/24504:00 AM   Indications for Augmentation: Hypertension   Labor complications: Additional complications:        Delivery Events:  Indications For Episiotomy:     Episiotomy:     Perineal Laceration(s):     Repaired:     Periurethral Laceration Location:      Repaired:     Labial Laceration Location:     Repaired:     Sulcal Laceration Location:     Repaired:     Vaginal Laceration Location:     Repaired:     Cervical Laceration Location:     Repaired:     Repair Suture: Vicryl 2-0;Vicryl 3-0   Number of Repair Packets:     Estimated Blood Loss (ml):  ml   Quantitative Blood Loss (ml):                Delivery Date: 3/31/2020    Delivery Time: 7:45 AM    Delivery Type: Vaginal, Vacuum (Extractor)  Vacuum attempted? No    Vacuum indication:     Vacuum type: bell cup   Application location: Low   First Attempt     Time applied: 3/31/2020  7:33 AM   Time removed: 3/31/2020  7:33 AM   Second Attempt    Time applied: 3/31/2020  7:36 AM   Time removed: 3/31/2020  7:36 AM   Third Attempt    Time applied: 3/31/2020  7:44 AM   Time removed: 3/31/2020  7:45 AM   Number of pulls: 3   Number of pop-offs: 2   Low-end pressure range:     High-end pressure range: Total application time: 90 seconds   Applied by: SONG   Failed? No     Sex:  Male     Gestational Age: 38w9d  Delivery Clinician:  Taniya Hernandez  Living Status: Living   Delivery Location: L&D            APGARS  One minute Five minutes Ten minutes   Skin color: 0   1        Heart rate: 2   2        Grimace: 2   2        Muscle tone: 2   2        Breathin   2        Totals: 8   9          Presentation: Vertex    Position: Left Occiput Anterior  Resuscitation Method:  Suctioning-bulb; Tactile Stimulation     Meconium Stained: Thin      Cord Information: 3 Vessels  Complications: None  Cord around:    Delayed cord clamping? Yes  Cord clamped date/time:3/31/2020  7:46 AM  Disposition of Cord Blood: Lab    Blood Gases Sent?: No    Placenta:  Date/Time: 3/31/2020  7:49 AM  Removal: Spontaneous      Appearance: Normal     [place] Measurements:  Birth Weight:        Birth Length:        Head Circumference:        Chest Circumference:       Abdominal Girth: Other Providers:   PELON ZULETA;ALEJANDRO CARRASQUILLO;ROJELIO LOPEZ;CHAS WOOD;MICHELLE POZO;LAVON GARCIA;DEWEY VASQUEZ, Obstetrician;Primary Nurse;Primary [place] Nurse;Charge Nurse;Neonatologist;Respiratory Therapist;Scrub Tech     The indication, risks, and benefits of vacuum delivery compared to  delivery were discussed with the patient and attendant family member. All questions were answered. Bladder was drained prior to instrumentation. Instrumentation easily achieved and positioning was checked and verified prior to attempt at deliver. Group B Strep:   Lab Results   Component Value Date/Time    GrBStrep, External Negative 2020     Information for the patient's :  Brittaney Nip [[id number]]   No results found for: ABORH, PCTABR, PCTDIG, BILI, ABORHEXT, ABORH    No results for input(s): PCO2CB, PO2CB, HCO3I, SO2I, IBD, PTEMPI, SPECTI, PHICB, ISITE, IDEV, IALLEN in the last 72 hours. To go home

## 2022-12-16 ENCOUNTER — TRANSCRIPTION ENCOUNTER (OUTPATIENT)
Age: 72
End: 2022-12-16

## 2022-12-16 VITALS
OXYGEN SATURATION: 98 % | TEMPERATURE: 98 F | RESPIRATION RATE: 18 BRPM | DIASTOLIC BLOOD PRESSURE: 80 MMHG | SYSTOLIC BLOOD PRESSURE: 130 MMHG | HEART RATE: 96 BPM

## 2022-12-16 PROCEDURE — 99232 SBSQ HOSP IP/OBS MODERATE 35: CPT

## 2022-12-16 RX ORDER — OXYCODONE HYDROCHLORIDE 5 MG/1
1 TABLET ORAL
Qty: 42 | Refills: 0
Start: 2022-12-16 | End: 2022-12-22

## 2022-12-16 RX ORDER — OMEPRAZOLE 10 MG/1
1 CAPSULE, DELAYED RELEASE ORAL
Qty: 28 | Refills: 0
Start: 2022-12-16 | End: 2023-01-12

## 2022-12-16 RX ORDER — ASPIRIN/CALCIUM CARB/MAGNESIUM 324 MG
1 TABLET ORAL
Qty: 28 | Refills: 0
Start: 2022-12-16 | End: 2022-12-29

## 2022-12-16 RX ORDER — CELECOXIB 200 MG/1
1 CAPSULE ORAL
Qty: 56 | Refills: 0
Start: 2022-12-16 | End: 2023-01-12

## 2022-12-16 RX ORDER — APIXABAN 2.5 MG/1
1 TABLET, FILM COATED ORAL
Qty: 28 | Refills: 0
Start: 2022-12-16 | End: 2022-12-29

## 2022-12-16 RX ADMIN — CARBIDOPA AND LEVODOPA 1 TABLET(S): 25; 100 TABLET ORAL at 17:39

## 2022-12-16 RX ADMIN — PRAMIPEXOLE DIHYDROCHLORIDE 0.25 MILLIGRAM(S): 0.12 TABLET ORAL at 11:58

## 2022-12-16 RX ADMIN — PRAMIPEXOLE DIHYDROCHLORIDE 0.25 MILLIGRAM(S): 0.12 TABLET ORAL at 00:03

## 2022-12-16 RX ADMIN — Medication 500 MILLIGRAM(S): at 15:15

## 2022-12-16 RX ADMIN — APIXABAN 2.5 MILLIGRAM(S): 2.5 TABLET, FILM COATED ORAL at 09:40

## 2022-12-16 RX ADMIN — BRIMONIDINE TARTRATE 1 DROP(S): 2 SOLUTION/ DROPS OPHTHALMIC at 05:16

## 2022-12-16 RX ADMIN — Medication 1000 MILLIGRAM(S): at 15:15

## 2022-12-16 RX ADMIN — Medication 1000 MILLIGRAM(S): at 06:31

## 2022-12-16 RX ADMIN — OXYCODONE HYDROCHLORIDE 5 MILLIGRAM(S): 5 TABLET ORAL at 05:18

## 2022-12-16 RX ADMIN — PANTOPRAZOLE SODIUM 40 MILLIGRAM(S): 20 TABLET, DELAYED RELEASE ORAL at 05:15

## 2022-12-16 RX ADMIN — Medication 1000 MILLIGRAM(S): at 05:15

## 2022-12-16 RX ADMIN — PRAMIPEXOLE DIHYDROCHLORIDE 0.25 MILLIGRAM(S): 0.12 TABLET ORAL at 17:39

## 2022-12-16 RX ADMIN — BRIMONIDINE TARTRATE 1 DROP(S): 2 SOLUTION/ DROPS OPHTHALMIC at 17:39

## 2022-12-16 RX ADMIN — CITALOPRAM 10 MILLIGRAM(S): 10 TABLET, FILM COATED ORAL at 11:56

## 2022-12-16 RX ADMIN — Medication 125 MICROGRAM(S): at 06:29

## 2022-12-16 RX ADMIN — CELECOXIB 200 MILLIGRAM(S): 200 CAPSULE ORAL at 09:39

## 2022-12-16 RX ADMIN — OXYCODONE HYDROCHLORIDE 5 MILLIGRAM(S): 5 TABLET ORAL at 17:10

## 2022-12-16 RX ADMIN — OXYCODONE HYDROCHLORIDE 5 MILLIGRAM(S): 5 TABLET ORAL at 05:45

## 2022-12-16 RX ADMIN — OXYCODONE HYDROCHLORIDE 5 MILLIGRAM(S): 5 TABLET ORAL at 18:00

## 2022-12-16 RX ADMIN — MUPIROCIN 1 APPLICATION(S): 20 OINTMENT TOPICAL at 17:39

## 2022-12-16 RX ADMIN — MUPIROCIN 1 APPLICATION(S): 20 OINTMENT TOPICAL at 05:16

## 2022-12-16 RX ADMIN — CARBIDOPA AND LEVODOPA 1 TABLET(S): 25; 100 TABLET ORAL at 11:56

## 2022-12-16 RX ADMIN — PRAMIPEXOLE DIHYDROCHLORIDE 0.25 MILLIGRAM(S): 0.12 TABLET ORAL at 06:30

## 2022-12-16 RX ADMIN — CARBIDOPA AND LEVODOPA 1 TABLET(S): 25; 100 TABLET ORAL at 05:16

## 2022-12-16 NOTE — DISCHARGE NOTE NURSING/CASE MANAGEMENT/SOCIAL WORK - NSDCPEFALRISK_GEN_ALL_CORE
For information on Fall & Injury Prevention, visit: [email or web address] OR  [email or web address] OR  https://www.cdc.gov/steadi/patient.html

## 2022-12-16 NOTE — PROGRESS NOTE ADULT - SUBJECTIVE AND OBJECTIVE BOX
Discharge medication calendar:  Eliquis 2.5mg q12h x 2 weeks then ASA EC 81mg q12h x 2 weeks  Celecoxib 200mg q12h x 2-3 weeks  Cefadroxil 500 mg q12h x 1 week  APAP 1000mg q8h x 2-3 weeks  Narcotic PRN  Docusate 100mg TID while taking narcotic  Miralax, Senna, or Bisacodyl PRN for treatment of constipation  
HOMA BULLOCKMARK                                                               [id number]                                                       Allergies---No Known Allergies        Pt is a 72y year old Female s/p right TKR.   Pt. is A&O x 3, resting comfortably, with no complaints.   Pain is 2/10.   Tolerating the diet.   Denies chest pain / shortness of breath / dyspnea / nausea / vomiting / headaches or light headed ness.         Vital Signs Last 24 Hrs  T(F): 97.8 (12-15-22 @ 08:53), Max: 97.8 (12-15-22 @ 08:53)  HR: 82 (12-15-22 @ 08:53)  BP: 159/84 (12-15-22 @ 08:53)  RR: 18 (12-15-22 @ 08:53)  SpO2: 98% (12-15-22 @ 08:53)    I&O's Detail    14 Dec 2022 07:01  -  15 Dec 2022 07:00  --------------------------------------------------------  IN:    Lactated Ringers: 1750 mL    Oral Fluid: 400 mL    Sodium Chloride 0.9% Bolus: 500 mL  Total IN: 2650 mL    OUT:    Accordian (mL): 100 mL    Blood Loss (mL): 200 mL    Urostomy (mL): 1025 mL  Total OUT: 1325 mL    Total NET: 1325 mL        PE:   Right Lower Extremity:   Pt is currently in a knee immobilizer.   Ace Bandage is C/D/I with Hemovac inplace.   No redness, swelling, heat, discharge or other evidence of infection, superficial or deep.   Neurovascularly intact.   No gross evidence of motor or sensory deficit.   +2 DP/PT pulses.   EHL/FHL/TA intact.   Toes are pink and mobile.   Capillary refill < 2 seconds.   Negative calf tenderness.   PAS on.                                11.5   8.34  )--------------( 235                          12-15-22 @ 08:40               35.5         140   |  105  |  20  -----------------------------<  128                  12-15-22 @ 08:40  4.6    |  27    |  1.14        Ca    9.0              A:   Pt is a 72y year old Female S/P revision right total knee replacement, Post Op Day #1        Plan:    - Follow up with Medicine    - OOB with PT/OT in knee immobilizer   - Pain control    - Incentive spirometry   - Labs in A.M.   - Discharge Planning   - DVT ppx = PAS +  apixaban 2.5 milliGRAM(s) Oral once  apixaban 2.5 milliGRAM(s) Oral once  tranexamic acid IVPB 1000 milliGRAM(s) IV Intermittent once  tranexamic acid IVPB 1000 milliGRAM(s) IV Intermittent once                                                                                                                                                                             Andrea BARRETO      
POD  #:  2  S/P  Revision Right TKR                       SUBJECTIVE: Patient seen and examined. Sitting up in chair with brace on. States she did much better today and plans to go home.  at bedside.  Reported Pain Score = 2    OBJECTIVE:     Vital Signs Last 24 Hrs  T(C): 36.5 (16 Dec 2022 07:43), Max: 36.8 (15 Dec 2022 15:00)  T(F): 97.7 (16 Dec 2022 07:43), Max: 98.3 (15 Dec 2022 15:00)  HR: 82 (16 Dec 2022 07:43) (74 - 96)  BP: 129/79 (16 Dec 2022 07:43) (108/69 - 156/82)  RR: 18 (16 Dec 2022 07:43) (17 - 18)  SpO2: 96% (16 Dec 2022 07:43) (95% - 96%)    Parameters below as of 16 Dec 2022 07:43  Patient On (Oxygen Delivery Method): room air        Right Knee: Collins brace in place         Ace wrap removed. ASHA Dressing: clean/dry/intact, battery on; functioning Hemovac drain in place = 10cc overnight.  Bilateral LEs:         Sensation:  intact to light touch          Motor exam:  5/5 dorsiflexion/plantarflexion/EHL          2+ DP pulses          calf supple, NT         SCDs in place    LABS:                        11.5   8.34  )-----------( 235      ( 15 Dec 2022 08:40 )             35.5     12-15    140  |  105  |  20  ----------------------------<  128<H>  4.6   |  27  |  1.14    Ca    9.0      15 Dec 2022 08:40            MEDICATIONS:  Anticoagulation:  apixaban 2.5 milliGRAM(s) Oral every 12 hours      Pain medications:   acetaminophen     Tablet .. 1000 milliGRAM(s) Oral every 8 hours  celecoxib 200 milliGRAM(s) Oral every 12 hours  HYDROmorphone  Injectable 0.5 milliGRAM(s) IV Push every 3 hours PRN  oxyCODONE    IR 5 milliGRAM(s) Oral every 3 hours PRN  oxyCODONE    IR 10 milliGRAM(s) Oral every 3 hours PRN          A/P : Patient stable  s/p Revision Right TKR POD # 2  -    hemovac removed. dressing placed over drain site   -    Pain control  -    DVT ppx: Eliquis 2.5mg q 12 h   -    Weight bearing status: WBAT in brace 0-30'  -    Physical Therapy  -    Occupational Therapy  -     Instructed ASHA use/care/removal   -    Discharge plan: home today once PT, OT cleared
PROGRESS NOTE:   Authoted by Dr. Ed Varela MD, Available on MS Teams    Patient is a 72y old  Female who presents with a chief complaint of revision right total knee replacement (15 Dec 2022 11:30)      SUBJECTIVE / OVERNIGHT EVENTS: Patient feel fine. No chest pain, shortness of breath, nausea, vomiting, or abdominal pain.     ADDITIONAL REVIEW OF SYSTEMS:    MEDICATIONS  (STANDING):  acetaminophen     Tablet .. 1000 milliGRAM(s) Oral every 8 hours  acetaminophen   IVPB .. 1000 milliGRAM(s) IV Intermittent once  apixaban 2.5 milliGRAM(s) Oral every 12 hours  brimonidine 0.2% Ophthalmic Solution 1 Drop(s) Both EYES two times a day  carbidopa/levodopa  25/100 1 Tablet(s) Oral four times a day  cefadroxil 500 milliGRAM(s) Oral every 12 hours  ceFAZolin   IVPB 2000 milliGRAM(s) IV Intermittent once  celecoxib 200 milliGRAM(s) Oral every 12 hours  citalopram 10 milliGRAM(s) Oral daily  lactated ringers. 1000 milliLiter(s) (125 mL/Hr) IV Continuous <Continuous>  levothyroxine 125 MICROGram(s) Oral daily  mupirocin 2% Nasal 1 Application(s) Both Nostrils two times a day  pantoprazole    Tablet 40 milliGRAM(s) Oral before breakfast  polyethylene glycol 3350 17 Gram(s) Oral at bedtime  pramipexole 0.25 milliGRAM(s) Oral four times a day  senna 2 Tablet(s) Oral at bedtime  tranexamic acid IVPB 1000 milliGRAM(s) IV Intermittent once  tranexamic acid IVPB 1000 milliGRAM(s) IV Intermittent once    MEDICATIONS  (PRN):  aluminum hydroxide/magnesium hydroxide/simethicone Suspension 30 milliLiter(s) Oral four times a day PRN Indigestion  bisacodyl Suppository 10 milliGRAM(s) Rectal once PRN Constipation  HYDROmorphone  Injectable 0.5 milliGRAM(s) IV Push every 3 hours PRN Breakthrough pain  magnesium hydroxide Suspension 30 milliLiter(s) Oral daily PRN Constipation  ondansetron Injectable 4 milliGRAM(s) IV Push every 6 hours PRN Nausea and/or Vomiting  oxyCODONE    IR 5 milliGRAM(s) Oral every 3 hours PRN Moderate Pain (4 - 6)  oxyCODONE    IR 10 milliGRAM(s) Oral every 3 hours PRN Severe Pain (7 - 10)      CAPILLARY BLOOD GLUCOSE        I&O's Summary    15 Dec 2022 07:01  -  16 Dec 2022 07:00  --------------------------------------------------------  IN: 0 mL / OUT: 2150 mL / NET: -2150 mL        PHYSICAL EXAM:  Vital Signs Last 24 Hrs  T(C): 36.5 (16 Dec 2022 07:43), Max: 36.8 (15 Dec 2022 15:00)  T(F): 97.7 (16 Dec 2022 07:43), Max: 98.3 (15 Dec 2022 15:00)  HR: 82 (16 Dec 2022 07:43) (74 - 96)  BP: 129/79 (16 Dec 2022 07:43) (108/69 - 156/82)  BP(mean): --  RR: 18 (16 Dec 2022 07:43) (17 - 18)  SpO2: 96% (16 Dec 2022 07:43) (95% - 96%)    Parameters below as of 16 Dec 2022 07:43  Patient On (Oxygen Delivery Method): room air        CONSTITUTIONAL: NAD, well-developed  RESPIRATORY: Normal respiratory effort; lungs are clear to auscultation bilaterally  CARDIOVASCULAR: Regular rate and rhythm, normal S1 and S2, no murmur/rub/gallop; No lower extremity edema  ABDOMEN: Nontender to palpation, normoactive bowel sounds, no rebound/guarding  MUSCLOSKELETAL: no clubbing or cyanosis of digits; RLE dressing intact  PSYCH: A+O to person, place, and time; affect appropriate    LABS:                        11.5   8.34  )-----------( 235      ( 15 Dec 2022 08:40 )             35.5     12-15    140  |  105  |  20  ----------------------------<  128<H>  4.6   |  27  |  1.14    Ca    9.0      15 Dec 2022 08:40                Culture - Joint Fluid (collected 14 Dec 2022 17:54)  Source: Joint Fl Knee Right  Gram Stain (15 Dec 2022 04:12):    polymorphonuclear leukocytes seen    No organisms seen    by cytocentrifuge  Preliminary Report (15 Dec 2022 18:28):    No growth        RADIOLOGY & ADDITIONAL TESTS:  Results Reviewed:   Imaging Personally Reviewed:  Electrocardiogram Personally Reviewed:    COORDINATION OF CARE:  Care Discussed with Consultants/Other Providers [Y/N]:  Prior or Outpatient Records Reviewed [Y/N]:

## 2022-12-16 NOTE — DISCHARGE NOTE NURSING/CASE MANAGEMENT/SOCIAL WORK - NSDCCRNAME_GEN_ALL_CORE_FT
[facility] ([facility])   [address] [place] [address]

## 2022-12-16 NOTE — DISCHARGE NOTE NURSING/CASE MANAGEMENT/SOCIAL WORK - NSSCNAMETXT_GEN_ALL_CORE
[facility] (formerly [facility])  [address] [place] [address]

## 2022-12-16 NOTE — DISCHARGE NOTE NURSING/CASE MANAGEMENT/SOCIAL WORK - PATIENT PORTAL LINK FT
You can access the FollowMyHealth Patient Portal offered by [facility] by registering at the following website: [email or web address]. By joining [employer]’s FollowMyHealth portal, you will also be able to view your health information using other applications (apps) compatible with our system.

## 2022-12-16 NOTE — DISCHARGE NOTE NURSING/CASE MANAGEMENT/SOCIAL WORK - NSSCCARECORD_GEN_ALL_CORE
Home Care Agency/[facility] Medical [facility] Home Care Agency/Durable Medical Equipment Agency/Community [facility]

## 2022-12-16 NOTE — PROGRESS NOTE ADULT - ASSESSMENT
72F with parkinson's, hypothyroid, s/p urostomy tube with right knee pain for 2 months S/p Revision of total arthroplasty of knee with replacement of polyethylene liner on 12/14/22    #TKR s/p revision POD2  - pain control and DVT ppx per primary team  - PT/OT  - incentive spirometery  - bowel regimen  - labs wnl    #Parkinson's  - continue home meds Sinemet and pramipexole    Hypothyroid  - resume home synthroid    Patient ready for discharge from medicine team

## 2022-12-20 ENCOUNTER — NON-APPOINTMENT (OUTPATIENT)
Age: 72
End: 2022-12-20

## 2022-12-20 NOTE — PROGRESS NOTE ADULT - NEUROLOGICAL
OT DAILY TREATMENT NOTE     Patient Name: Aquiles Ardon  Date:2022  : 1956  [x]  Patient  Verified  [place]or: [place] MED[place] / Plan: [place] MEDICARE PART A & B / Product Type: Medicare /    In time:9:30 AM  Out time:10:06 AM  Total Treatment Time (min): 36  Visit #: 7 of 8    Medicare/BCBS Only   Total Timed Codes (min):  36 1:1 Treatment Time:  36     Treatment Area: Hemiplegia and hemiparesis following cerebral infarction affecting right dominant side [I69.351]    SUBJECTIVE  Pain Level (0-10 scale): 0/10  Any medication changes, allergies to medications, adverse drug reactions, diagnosis change, or new procedure performed?: [x] No    [] Yes (see summary sheet for update)  Subjective functional status/changes:   [] No changes reported  \"I do my stretches and I use my left hand to make the right one work. \"    OBJECTIVE    21 min Therapeutic Exercise:  [x] See flow sheet :   Rationale: increase ROM, increase strength, improve coordination, and increase proprioception to improve the patients ability to grasp, pinch, lift and move right UE for functional daily tasks.      Right UE:    Recheck for progress  Hand and wrist PROM  Shoulder shrugs/retractions    15 min Therapeutic Activity:  [x]  See flow sheet :   Rationale: increase ROM and improve coordination  to improve the patients ability to manipulate small items using right hand    Right UE:  9 hole peg test  Foam piece grasping     With   [] TE   [] TA   [] neuro   [] other: Patient Education: [x] Review HEP    [] Progressed/Changed HEP based on:   [] positioning   [] body mechanics   [] transfers   [] heat/ice application   [] Splint wear/care   [] Sensory re-education   [] scar management      [] other:            Other Objective/Functional Measures:   Shoulder ROM   2022    Flexion 95 (-19)   Extension    Abduction    ER    IR      Range of Motion:   Elbow/Wrist   Wrist 2022  Right/Left   Flex    Ext    UD    RD    FA     Pro    Sup 38 from neutral - 60 PROM/75   Elbow    Flex 80 (+7)   Ext 24 (-4)         Measurements: Taken with Samy Dynamometer, in Lbs   Level 2 12/20/2022  Date   Right 12    Left     Deficit     Change           Pinch Measurements: Taken with Pinch Gauge, in Lbs   (hand) 12/20/2022    Lateral     Right 3   Left     Deficit    Change      9 Hole    12/20/2022     Right 2 minutes 49 seconds  (Out only)   Left     ** Unassisted in standing       Pain Level (0-10 scale) post treatment: 0/10    ASSESSMENT/Changes in Function: Pt demonstrates improved ability to perform lateral pinch. Assessed right UE hand  and lateral pinch strength. Pt reports increasing use of right UE for turning light switches on and off and opening doors. Pt demonstrated ability to remove pegs from pegboard, regressed from 12/13/2022. He reports improved [place] assist to complete household tasks, and retrieving items from the floor. Patient will continue to benefit from skilled OT services to modify and progress therapeutic interventions, address ROM deficits, address strength deficits, analyze and address soft tissue restrictions, analyze and cue movement patterns, analyze and modify body mechanics/ergonomics, and instruct in home and community integration to attain remaining goals. []  See Plan of Care  [x]  See progress note/recertification  []  See Discharge Summary         Progress towards goals / Updated goals:  Patient will be able to use right UE as an assist for 50% of the time or greater during self-care and IADL tasks. Status at PN : 30-35%, Progressing   Current Status (12/9/2022): Progressing  Status at recert 13/18/6127 - 58-94%, no change since last recert     2. Patient will be independent in demonstrating and performing activity modification strategies to aid in success for ADL/IADL tasks.   Status at PN:  Progressing, using adaptive toothbrush gamble with Left UE assist   Status at recert 37/17/3496 - pt using adaptive toothbrush gamble, no other aids at this time     3. Patient will be able to pinch to retrieve small objects with the right hand in order to maintain fine motor skills for ADLs/IADLs. Status at PN :Progressing, goal continued for increased carryover and performance    Current Status (12/9/2022): Goal Met     4. Patient will report improved ability to perform house hold chores such as dusting and vacuuming while functional assist from Right UE. Status at recert 37/36/4297 - pt reports [place] assist to perform push mowing, dusting and vacuuming tasks, goal met     5. Patient will demonstrate ability to  5 items from floor in clinic with Right UE due to improved gross motor control and improved pinch.    Current Status (12/2/2022): Goal Met    PLAN  [x]  Upgrade activities as tolerated     [x]  Continue plan of care  []  Update interventions per flow sheet       []  Discharge due to:_  []  Other:_      Leanne Mendieta OT 12/20/2022  9:26 AM    Future Appointments   Date Time Provider Marc Ruvalcaba   12/20/2022  9:30 AM Hansa Pollard OT MMCPTPB [facility]   12/23/2022  8:00 AM Laila Pereira YDIHRDW [facility]   12/27/2022 10:00 AM Laila Pereira SDJIVCH [facility]   12/30/2022  9:00 AM Lonny Hawkins OT MMCPTPB [facility]   1/3/2023  9:00 AM Laila Pereira CSLNLHU [facility]   1/6/2023  8:30 AM Laila Pereira GFSOARI [facility]   1/10/2023  9:30 AM Laila Pereira INOCQXZ [facility]   1/13/2023  8:30 AM Laila Pereira RBCLJLN [facility]   1/17/2023  9:00 AM Laila Pereira DHWPCON [facility]   1/20/2023  8:30 AM Laila Pereira JQUPFWV [facility]   1/24/2023  9:00 AM Laila Pereira TUSDRAX [facility]   1/27/2023  8:30 AM Laila Pereira PPOZLZV [facility]   9/5/2023  9:50 AM [facility] NURSE NOAM FIORE [facility]   9/12/2023 10:00 AM PRANAY Lopez negative Alert & oriented; no sensory, motor or coordination deficits, normal reflexes

## 2022-12-27 ENCOUNTER — APPOINTMENT (OUTPATIENT)
Dept: ORTHOPEDIC SURGERY | Facility: CLINIC | Age: 72
End: 2022-12-27
Payer: MEDICARE

## 2022-12-27 VITALS — HEART RATE: 75 BPM | SYSTOLIC BLOOD PRESSURE: 139 MMHG | DIASTOLIC BLOOD PRESSURE: 76 MMHG

## 2022-12-27 VITALS — TEMPERATURE: 97.6 F

## 2022-12-27 DIAGNOSIS — Z29.9 ENCOUNTER FOR PROPHYLACTIC MEASURES, UNSPECIFIED: ICD-10-CM

## 2022-12-27 DIAGNOSIS — T78.49XA OTHER ALLERGY, INITIAL ENCOUNTER: ICD-10-CM

## 2022-12-27 DIAGNOSIS — M25.561 PAIN IN RIGHT KNEE: ICD-10-CM

## 2022-12-27 PROBLEM — I87.2 VENOUS INSUFFICIENCY (CHRONIC) (PERIPHERAL): Chronic | Status: ACTIVE | Noted: 2022-12-12

## 2022-12-27 PROBLEM — U07.1 COVID-19: Chronic | Status: ACTIVE | Noted: 2022-12-12

## 2022-12-27 PROCEDURE — 73562 X-RAY EXAM OF KNEE 3: CPT | Mod: RT

## 2022-12-27 PROCEDURE — 99024 POSTOP FOLLOW-UP VISIT: CPT

## 2022-12-27 RX ORDER — ASPIRIN 81 MG/1
81 TABLET ORAL TWICE DAILY
Qty: 30 | Refills: 0 | Status: ACTIVE | COMMUNITY
Start: 2022-12-27 | End: 1900-01-01

## 2022-12-27 RX ORDER — CEFADROXIL 500 MG/1
500 CAPSULE ORAL TWICE DAILY
Qty: 14 | Refills: 1 | Status: ACTIVE | COMMUNITY
Start: 2022-12-27 | End: 1900-01-01

## 2022-12-27 RX ORDER — OXYCODONE 5 MG/1
5 TABLET ORAL
Qty: 40 | Refills: 0 | Status: ACTIVE | COMMUNITY
Start: 2022-12-27 | End: 1900-01-01

## 2022-12-28 PROBLEM — T78.49XA ALLERGIC REACTION TO ADHESIVE: Status: ACTIVE | Noted: 2022-12-28

## 2022-12-28 PROBLEM — Z29.9 PROPHYLACTIC MEASURE: Status: ACTIVE | Noted: 2022-12-27

## 2022-12-28 PROBLEM — M25.561 RIGHT KNEE PAIN, UNSPECIFIED CHRONICITY: Status: ACTIVE | Noted: 2022-12-28

## 2022-12-28 LAB
CULTURE RESULTS: SIGNIFICANT CHANGE UP
SPECIMEN SOURCE: SIGNIFICANT CHANGE UP

## 2022-12-28 RX ORDER — CEFADROXIL 500 MG/1
500 CAPSULE ORAL
Qty: 14 | Refills: 0 | Status: ACTIVE | COMMUNITY
Start: 2022-12-16

## 2022-12-28 RX ORDER — ASPIRIN 81 MG/1
81 TABLET, COATED ORAL
Qty: 28 | Refills: 0 | Status: ACTIVE | COMMUNITY
Start: 2022-12-16

## 2022-12-28 RX ORDER — MUPIROCIN 20 MG/G
2 OINTMENT TOPICAL
Qty: 22 | Refills: 0 | Status: ACTIVE | COMMUNITY
Start: 2022-12-13

## 2023-01-03 ENCOUNTER — INPATIENT (INPATIENT)
Facility: HOSPITAL | Age: 73
LOS: 5 days | Discharge: NURSING, REHAB OR LONG-TERM CARE | DRG: 467 | End: 2023-01-09
Attending: ORTHOPAEDIC SURGERY | Admitting: ORTHOPAEDIC SURGERY
Payer: MEDICARE

## 2023-01-03 ENCOUNTER — APPOINTMENT (OUTPATIENT)
Dept: ORTHOPEDIC SURGERY | Facility: CLINIC | Age: 73
End: 2023-01-03

## 2023-01-03 VITALS
HEART RATE: 89 BPM | OXYGEN SATURATION: 97 % | TEMPERATURE: 98 F | SYSTOLIC BLOOD PRESSURE: 151 MMHG | WEIGHT: 244.93 LBS | RESPIRATION RATE: 15 BRPM | HEIGHT: 63 IN | DIASTOLIC BLOOD PRESSURE: 84 MMHG

## 2023-01-03 DIAGNOSIS — Z96.651 PRESENCE OF RIGHT ARTIFICIAL KNEE JOINT: Chronic | ICD-10-CM

## 2023-01-03 DIAGNOSIS — Z98.890 OTHER SPECIFIED POSTPROCEDURAL STATES: Chronic | ICD-10-CM

## 2023-01-03 DIAGNOSIS — M25.561 PAIN IN RIGHT KNEE: Chronic | ICD-10-CM

## 2023-01-03 DIAGNOSIS — T81.31XA DISRUPTION OF EXTERNAL OPERATION (SURGICAL) WOUND, NOT ELSEWHERE CLASSIFIED, INITIAL ENCOUNTER: ICD-10-CM

## 2023-01-03 DIAGNOSIS — Z98.89 OTHER SPECIFIED POSTPROCEDURAL STATES: Chronic | ICD-10-CM

## 2023-01-03 DIAGNOSIS — Z96.659 PRESENCE OF UNSPECIFIED ARTIFICIAL KNEE JOINT: Chronic | ICD-10-CM

## 2023-01-03 DIAGNOSIS — T81.30XA DISRUPTION OF WOUND, UNSPECIFIED, INITIAL ENCOUNTER: ICD-10-CM

## 2023-01-03 LAB
ALBUMIN SERPL ELPH-MCNC: 3.4 G/DL — SIGNIFICANT CHANGE UP (ref 3.3–5)
ALP SERPL-CCNC: 66 U/L — SIGNIFICANT CHANGE UP (ref 30–120)
ALT FLD-CCNC: 10 U/L DA — SIGNIFICANT CHANGE UP (ref 10–60)
ANION GAP SERPL CALC-SCNC: 6 MMOL/L — SIGNIFICANT CHANGE UP (ref 5–17)
APTT BLD: 30.6 SEC — SIGNIFICANT CHANGE UP (ref 27.5–35.5)
AST SERPL-CCNC: 23 U/L — SIGNIFICANT CHANGE UP (ref 10–40)
BASOPHILS # BLD AUTO: 0.09 K/UL — SIGNIFICANT CHANGE UP (ref 0–0.2)
BASOPHILS NFR BLD AUTO: 0.7 % — SIGNIFICANT CHANGE UP (ref 0–2)
BILIRUB SERPL-MCNC: 0.4 MG/DL — SIGNIFICANT CHANGE UP (ref 0.2–1.2)
BUN SERPL-MCNC: 30 MG/DL — HIGH (ref 7–23)
CALCIUM SERPL-MCNC: 9 MG/DL — SIGNIFICANT CHANGE UP (ref 8.4–10.5)
CHLORIDE SERPL-SCNC: 104 MMOL/L — SIGNIFICANT CHANGE UP (ref 96–108)
CO2 SERPL-SCNC: 28 MMOL/L — SIGNIFICANT CHANGE UP (ref 22–31)
CREAT SERPL-MCNC: 1.09 MG/DL — SIGNIFICANT CHANGE UP (ref 0.5–1.3)
EGFR: 53 ML/MIN/1.73M2 — LOW
EOSINOPHIL # BLD AUTO: 0.36 K/UL — SIGNIFICANT CHANGE UP (ref 0–0.5)
EOSINOPHIL NFR BLD AUTO: 2.8 % — SIGNIFICANT CHANGE UP (ref 0–6)
GLUCOSE SERPL-MCNC: 117 MG/DL — HIGH (ref 70–99)
HCT VFR BLD CALC: 33.6 % — LOW (ref 34.5–45)
HGB BLD-MCNC: 10.9 G/DL — LOW (ref 11.5–15.5)
IMM GRANULOCYTES NFR BLD AUTO: 0.5 % — SIGNIFICANT CHANGE UP (ref 0–0.9)
INR BLD: 1.06 RATIO — SIGNIFICANT CHANGE UP (ref 0.88–1.16)
LYMPHOCYTES # BLD AUTO: 1.07 K/UL — SIGNIFICANT CHANGE UP (ref 1–3.3)
LYMPHOCYTES # BLD AUTO: 8.2 % — LOW (ref 13–44)
MCHC RBC-ENTMCNC: 32.4 GM/DL — SIGNIFICANT CHANGE UP (ref 32–36)
MCHC RBC-ENTMCNC: 32.9 PG — SIGNIFICANT CHANGE UP (ref 27–34)
MCV RBC AUTO: 101.5 FL — HIGH (ref 80–100)
MONOCYTES # BLD AUTO: 0.69 K/UL — SIGNIFICANT CHANGE UP (ref 0–0.9)
MONOCYTES NFR BLD AUTO: 5.3 % — SIGNIFICANT CHANGE UP (ref 2–14)
NEUTROPHILS # BLD AUTO: 10.71 K/UL — HIGH (ref 1.8–7.4)
NEUTROPHILS NFR BLD AUTO: 82.5 % — HIGH (ref 43–77)
NRBC # BLD: 0 /100 WBCS — SIGNIFICANT CHANGE UP (ref 0–0)
PLATELET # BLD AUTO: 287 K/UL — SIGNIFICANT CHANGE UP (ref 150–400)
POTASSIUM SERPL-MCNC: 4.9 MMOL/L — SIGNIFICANT CHANGE UP (ref 3.5–5.3)
POTASSIUM SERPL-SCNC: 4.9 MMOL/L — SIGNIFICANT CHANGE UP (ref 3.5–5.3)
PROT SERPL-MCNC: 7.6 G/DL — SIGNIFICANT CHANGE UP (ref 6–8.3)
PROTHROM AB SERPL-ACNC: 12.2 SEC — SIGNIFICANT CHANGE UP (ref 10.5–13.4)
RBC # BLD: 3.31 M/UL — LOW (ref 3.8–5.2)
RBC # FLD: 14.6 % — HIGH (ref 10.3–14.5)
SARS-COV-2 RNA SPEC QL NAA+PROBE: SIGNIFICANT CHANGE UP
SODIUM SERPL-SCNC: 138 MMOL/L — SIGNIFICANT CHANGE UP (ref 135–145)
WBC # BLD: 12.98 K/UL — HIGH (ref 3.8–10.5)
WBC # FLD AUTO: 12.98 K/UL — HIGH (ref 3.8–10.5)

## 2023-01-03 PROCEDURE — 73610 X-RAY EXAM OF ANKLE: CPT | Mod: 26,RT

## 2023-01-03 PROCEDURE — 71045 X-RAY EXAM CHEST 1 VIEW: CPT | Mod: 26

## 2023-01-03 PROCEDURE — 99285 EMERGENCY DEPT VISIT HI MDM: CPT | Mod: FS

## 2023-01-03 PROCEDURE — 73562 X-RAY EXAM OF KNEE 3: CPT | Mod: 26,RT

## 2023-01-03 PROCEDURE — 73552 X-RAY EXAM OF FEMUR 2/>: CPT | Mod: 26,RT

## 2023-01-03 PROCEDURE — 99223 1ST HOSP IP/OBS HIGH 75: CPT

## 2023-01-03 RX ORDER — POLYETHYLENE GLYCOL 3350 17 G/17G
17 POWDER, FOR SOLUTION ORAL AT BEDTIME
Refills: 0 | Status: DISCONTINUED | OUTPATIENT
Start: 2023-01-03 | End: 2023-01-06

## 2023-01-03 RX ORDER — BRIMONIDINE TARTRATE 2 MG/MG
1 SOLUTION/ DROPS OPHTHALMIC
Refills: 0 | Status: DISCONTINUED | OUTPATIENT
Start: 2023-01-03 | End: 2023-01-09

## 2023-01-03 RX ORDER — SENNA PLUS 8.6 MG/1
2 TABLET ORAL AT BEDTIME
Refills: 0 | Status: DISCONTINUED | OUTPATIENT
Start: 2023-01-03 | End: 2023-01-06

## 2023-01-03 RX ORDER — PRAMIPEXOLE DIHYDROCHLORIDE 0.12 MG/1
0.5 TABLET ORAL
Refills: 0 | Status: DISCONTINUED | OUTPATIENT
Start: 2023-01-03 | End: 2023-01-06

## 2023-01-03 RX ORDER — PANTOPRAZOLE SODIUM 20 MG/1
40 TABLET, DELAYED RELEASE ORAL
Refills: 0 | Status: DISCONTINUED | OUTPATIENT
Start: 2023-01-03 | End: 2023-01-06

## 2023-01-03 RX ORDER — MORPHINE SULFATE 50 MG/1
4 CAPSULE, EXTENDED RELEASE ORAL ONCE
Refills: 0 | Status: DISCONTINUED | OUTPATIENT
Start: 2023-01-03 | End: 2023-01-03

## 2023-01-03 RX ORDER — SODIUM CHLORIDE 9 MG/ML
1000 INJECTION, SOLUTION INTRAVENOUS
Refills: 0 | Status: DISCONTINUED | OUTPATIENT
Start: 2023-01-06 | End: 2023-01-09

## 2023-01-03 RX ORDER — ASPIRIN/CALCIUM CARB/MAGNESIUM 324 MG
81 TABLET ORAL EVERY 12 HOURS
Refills: 0 | Status: DISCONTINUED | OUTPATIENT
Start: 2023-01-03 | End: 2023-01-05

## 2023-01-03 RX ORDER — ACETAMINOPHEN 500 MG
1000 TABLET ORAL EVERY 8 HOURS
Refills: 0 | Status: DISCONTINUED | OUTPATIENT
Start: 2023-01-03 | End: 2023-01-06

## 2023-01-03 RX ORDER — VANCOMYCIN HCL 1 G
1000 VIAL (EA) INTRAVENOUS ONCE
Refills: 0 | Status: COMPLETED | OUTPATIENT
Start: 2023-01-03 | End: 2023-01-03

## 2023-01-03 RX ORDER — ONDANSETRON 8 MG/1
4 TABLET, FILM COATED ORAL ONCE
Refills: 0 | Status: COMPLETED | OUTPATIENT
Start: 2023-01-03 | End: 2023-01-03

## 2023-01-03 RX ORDER — LEVOTHYROXINE SODIUM 125 MCG
125 TABLET ORAL DAILY
Refills: 0 | Status: DISCONTINUED | OUTPATIENT
Start: 2023-01-03 | End: 2023-01-06

## 2023-01-03 RX ORDER — OXYCODONE HYDROCHLORIDE 5 MG/1
5 TABLET ORAL
Refills: 0 | Status: DISCONTINUED | OUTPATIENT
Start: 2023-01-03 | End: 2023-01-06

## 2023-01-03 RX ORDER — CELECOXIB 200 MG/1
200 CAPSULE ORAL EVERY 12 HOURS
Refills: 0 | Status: DISCONTINUED | OUTPATIENT
Start: 2023-01-03 | End: 2023-01-06

## 2023-01-03 RX ORDER — OXYCODONE HYDROCHLORIDE 5 MG/1
10 TABLET ORAL
Refills: 0 | Status: DISCONTINUED | OUTPATIENT
Start: 2023-01-03 | End: 2023-01-06

## 2023-01-03 RX ORDER — CITALOPRAM 10 MG/1
10 TABLET, FILM COATED ORAL DAILY
Refills: 0 | Status: DISCONTINUED | OUTPATIENT
Start: 2023-01-03 | End: 2023-01-09

## 2023-01-03 RX ORDER — CARBIDOPA AND LEVODOPA 25; 100 MG/1; MG/1
1 TABLET ORAL
Refills: 0 | Status: DISCONTINUED | OUTPATIENT
Start: 2023-01-03 | End: 2023-01-09

## 2023-01-03 RX ORDER — VANCOMYCIN HCL 1 G
1000 VIAL (EA) INTRAVENOUS EVERY 12 HOURS
Refills: 0 | Status: DISCONTINUED | OUTPATIENT
Start: 2023-01-03 | End: 2023-01-04

## 2023-01-03 RX ADMIN — Medication 1000 MILLIGRAM(S): at 21:25

## 2023-01-03 RX ADMIN — Medication 1000 MILLIGRAM(S): at 22:00

## 2023-01-03 RX ADMIN — PANTOPRAZOLE SODIUM 40 MILLIGRAM(S): 20 TABLET, DELAYED RELEASE ORAL at 17:51

## 2023-01-03 RX ADMIN — MORPHINE SULFATE 4 MILLIGRAM(S): 50 CAPSULE, EXTENDED RELEASE ORAL at 12:29

## 2023-01-03 RX ADMIN — CARBIDOPA AND LEVODOPA 1 TABLET(S): 25; 100 TABLET ORAL at 17:51

## 2023-01-03 RX ADMIN — CARBIDOPA AND LEVODOPA 1 TABLET(S): 25; 100 TABLET ORAL at 21:27

## 2023-01-03 RX ADMIN — BRIMONIDINE TARTRATE 1 DROP(S): 2 SOLUTION/ DROPS OPHTHALMIC at 17:53

## 2023-01-03 RX ADMIN — MORPHINE SULFATE 4 MILLIGRAM(S): 50 CAPSULE, EXTENDED RELEASE ORAL at 13:30

## 2023-01-03 RX ADMIN — PRAMIPEXOLE DIHYDROCHLORIDE 0.5 MILLIGRAM(S): 0.12 TABLET ORAL at 17:52

## 2023-01-03 RX ADMIN — MORPHINE SULFATE 4 MILLIGRAM(S): 50 CAPSULE, EXTENDED RELEASE ORAL at 14:05

## 2023-01-03 RX ADMIN — CELECOXIB 200 MILLIGRAM(S): 200 CAPSULE ORAL at 21:25

## 2023-01-03 RX ADMIN — OXYCODONE HYDROCHLORIDE 10 MILLIGRAM(S): 5 TABLET ORAL at 18:40

## 2023-01-03 RX ADMIN — OXYCODONE HYDROCHLORIDE 10 MILLIGRAM(S): 5 TABLET ORAL at 21:00

## 2023-01-03 RX ADMIN — PRAMIPEXOLE DIHYDROCHLORIDE 0.5 MILLIGRAM(S): 0.12 TABLET ORAL at 21:30

## 2023-01-03 RX ADMIN — OXYCODONE HYDROCHLORIDE 10 MILLIGRAM(S): 5 TABLET ORAL at 16:01

## 2023-01-03 RX ADMIN — CELECOXIB 200 MILLIGRAM(S): 200 CAPSULE ORAL at 22:00

## 2023-01-03 RX ADMIN — OXYCODONE HYDROCHLORIDE 10 MILLIGRAM(S): 5 TABLET ORAL at 23:38

## 2023-01-03 RX ADMIN — Medication 1000 MILLIGRAM(S): at 14:39

## 2023-01-03 RX ADMIN — OXYCODONE HYDROCHLORIDE 10 MILLIGRAM(S): 5 TABLET ORAL at 20:29

## 2023-01-03 RX ADMIN — Medication 250 MILLIGRAM(S): at 13:39

## 2023-01-03 RX ADMIN — ONDANSETRON 4 MILLIGRAM(S): 8 TABLET, FILM COATED ORAL at 14:11

## 2023-01-03 RX ADMIN — MORPHINE SULFATE 4 MILLIGRAM(S): 50 CAPSULE, EXTENDED RELEASE ORAL at 13:39

## 2023-01-03 RX ADMIN — Medication 81 MILLIGRAM(S): at 17:53

## 2023-01-03 NOTE — CONSULT NOTE ADULT - SUBJECTIVE AND OBJECTIVE BOX
Hospitalist Medicine - Consult Note    CC: R Knee Pain/Fall  HPI: 72F with PMHX Parkinson's Disease, HTN, HLD, Hypothyroidism, MDD, Malignant Bladder Polyps s/p bladder resection/urostomy, s/p Recent R TKR presents to [facility] s/p fall on way to sign her Orthopedist at his office now admitted to Orthopedic Sx Service for R Knee Wound possible need for revision. No head trauma. Medicine consulted for co-mgmt/medical optimization. Pt seen/examined. Walks with cane/walker since R TKR and hx Parkinsons' Disease. Completes ADLs. Mets >4 prior to R TKR. No hx valvular disease. Pt c/o R knee pain otherwise without complaints.     ROS negative unless mentioned.    PMHX: Parkinson's Disease, HTN, HLD, Hypothyroidism, MDD, Malignant Bladder Polyps s/p bladder resection/urostomy  PSHX: Bladder Resection/Urostomy, CSection x2, TKR x2 with revision   Social hx: Denies etoh/tobacco/drug abuse  FamHx: +Mother and Father +CAD  NKDA    Vital Signs Last 24 Hrs  T(C): 36.7 (03 Jan 2023 22:20), Max: 36.9 (03 Jan 2023 17:55)  T(F): 98.1 (03 Jan 2023 22:20), Max: 98.4 (03 Jan 2023 17:55)  HR: 69 (03 Jan 2023 22:20) (69 - 98)  BP: 111/69 (03 Jan 2023 22:20) (100/61 - 151/84)  BP(mean): --  RR: 18 (03 Jan 2023 22:20) (15 - 18)  SpO2: 95% (03 Jan 2023 22:20) (95% - 99%)    Parameters below as of 03 Jan 2023 22:20  Patient On (Oxygen Delivery Method): room air    Constitutional: Well-appearing, NAD, VSS  Head: NC/AT  Eyes: PERRL, EOMI, anicteric sclera, conjunctiva WNL  ENT: Normal Pharynx, MMM, No tonsillar exudate/erythema  Neck: Supple, Non-tender  Chest: Non-tender, no rashes  Cardio: RRR, s1/s2, no appreciable murmurs/rubs/gallops  Resp: BS CTA bilaterally, no wheezing/rhonchi/rales  Abd: Soft, Non-tender, Non-distended, no rebound/guarding/rigidity  : not examined  Rectal: not examined  MSK: +R Knee Wound/Inciison s/p steristrips bandage in place CDI  Ext: palpable distal pulses, good capillary refill  Psych: appropriate, cooperative  Neuro: CN II-XII grossly intact, no focal deficits  Skin: Warm/Dry. No rashes.      EKG NSR no acute ischemic changes poor R wave progression abnormal EKG    CXR +mild pulm vascular congestion

## 2023-01-03 NOTE — ED PROVIDER NOTE - NSICDXPASTMEDICALHX_GEN_ALL_CORE_FT
PAST MEDICAL HISTORY:  2019 novel coronavirus disease (COVID-19)     Chronic venous insufficiency     Hyperlipidemia     Hypothyroid     Morbid obesity     Morbid obesity with BMI of 40.0-44.9, adult     Osteoarthritis     Parkinsons disease diagnosed 5 years ago

## 2023-01-03 NOTE — H&P ADULT - HISTORY OF PRESENT ILLNESS
Patient is a 72y.o female with h/o Parkinson's, GERD, hypothyroid morbid obesity, and urostomy was  brought in to the ED by ambulance after fall today. Patient states she was going down the stairs on her way her follow up visit to Dr. Oh's office  when her legs locked and became tucked under her as she slowly fell to the ground. She had severe right knee pain and  noticed that her surgical incision was bleeding. Patient had a revision of her right TKR on 12/14/22 by Dr. Oh. Also complains of some right ankle discomfort. Denies head trauma, chest pain, SOB, nausea or any other trauma.

## 2023-01-03 NOTE — CONSULT NOTE ADULT - ASSESSMENT
ASSESSMENT:  72F with PMHX Parkinson's Disease, HTN, Hypothyroidism, MDD, Malignant Bladder Polyps s/p bladder resection/urostomy, s/p Recent R TKR presents to [place] ER s/p fall on way to sign her Orthopedist at his office now admitted to Orthopedic Sx Service for R Knee Wound possible need for revision    PLAN:  R Knee/Open Incision 2/2 Fall  -Repeat Labs/trend WBC  -Empiric IV ABX with Vanco for Hx MRSA  -EKG reviewed nonischemic  -No hx Valvular Disease, No active ischemia, no acute CHF, no arrhythmias  -No absolute contraindication to surgery  -RCRI 0.4% low risk for intervention  -METS >4 prior to recent surgical intervention now mobile with walker/cane  -Patient medically optimized for orthopedic surgical intervention if necessary  -VTE PPX/AC per Ortho  -Judicious use of IVF given mild congestion on CXR  -Monitor Tele post-op  -Pain Control with Oxycodone/Celecoxib    Leukocytosis  -Reactive from pain/fall or knee infection  -Trend CBC  -Continue Vanco    HTN  -Antihypertensives (Triamterene/HCTZ) dc'd due to MJ in the past  -Monitor BP    Parkinson's Disease  -Carbidopa/Levodopa 25/100 QID  -Pramipexole 0.5mg QID    MDD  -Citalopram 10mg q24    Levothyroxine  -Levothyroxine 125mcg q24    Malignant Bladder Polyps s/p Bladder Resection/Urostomy  -Monitor UOP

## 2023-01-03 NOTE — ED ADULT NURSE NOTE - CAS EDN INTEG ASSESS
Pt seen in clinic. She was pleasant. Dr. Ibarra explained to pt that at year 3 she needs a LHC. Pt upset that she needs to come back for LHC. Changed exercise stress echo to echo and will make appt for pt to see BRENNEN Jacobsen. Pt up to date with mammogram, pap, flu vaccine. Pt asked if she should get the pneumonia vaccine, we advised she should get the vaccine. Pt will check her records at home when she had her last colonoscopy and will send me a message.   - - -

## 2023-01-03 NOTE — H&P ADULT - MUSCULOSKELETAL COMMENTS
partial right surgical wound dehiscence, midline. No active drainage or bleeding noted. Right knee is swollen and tender to palpation. 2+ pitting edema noted along anterior aspect of right lower leg and ankle. FROM of right ankle without any medial or lateral malleolus tenderness. Calves soft/NT bilaterally. Sensation grossly intact to bilateral lower extremities. EHL/FHL 5/5 bilaterally.

## 2023-01-03 NOTE — ED PROVIDER NOTE - NS ED ATTENDING STATEMENT MOD
This was a shared visit with the SHAQUILLE. I reviewed and verified the documentation and independently performed the documented:

## 2023-01-03 NOTE — H&P ADULT - ASSESSMENT
72 y.o female with h/o revision of right TKR, GERD, hyperlipidemia, Parkinson's, hypothyroidism, and morbid obesity admitted with partial midline dehiscence of right knee surgical incision s/p fall today.

## 2023-01-03 NOTE — ED PROVIDER NOTE - OBJECTIVE STATEMENT
71-year-old female with history of Parkinson's, hypothyroidism, GERD, hyperlipidemia, status post urostomy tube, history of initial right knee replacement in 2016, status post revision of total arthroplasty of the right knee on 12/14/2022 by Dr. Oh brought in by ambulance with right knee pain status post fall today. Patient states that she was heading to the orthopedist follow-up appointment today, when he knees gave out and she went down to her buttocks but injured her R knee. States that she has severe pain to her R knee with bleeding. Denies head trauma, LOC, neck/back/hip pain, N/V, numbness, CP, SOB or other symptoms/injuries. 71-year-old female with history of Parkinson's, hypothyroidism, GERD, hyperlipidemia, status post urostomy tube, history of initial right knee replacement in 2016, status post revision of total arthroplasty of the right knee on 12/14/2022 by Dr. Oh brought in by ambulance with right knee pain status post fall today. Patient states that she was heading to the orthopedist follow-up appointment today, when her legs locked up and she started to fell but was caught by her family member and was lowered to the ground onto her buttocks. States that she has severe pain to her R knee with bleeding. Denies head trauma, LOC, neck/back/hip pain, N/V, numbness, CP, SOB or other symptoms/injuries.

## 2023-01-03 NOTE — H&P ADULT - NSICDXPASTSURGICALHX_GEN_ALL_CORE_FT
PAST SURGICAL HISTORY:  H/O total knee replacement, right     History of urostomy     Right knee pain     S/P  x 2    S/P knee replacement bilateral 1 week apart     S/P knee surgery s/p cement spacer due to bacterial infection 2017    S/P revision of total knee, right

## 2023-01-03 NOTE — H&P ADULT - PROBLEM SELECTOR PLAN 1
Right knee incision irrigated with betadine and normal saline. Steri strips and dry dressing applied.   Patient will be re-evaluated by Dr. Oh tomorrow.  Vancomycin 1000mg Q12 hours  NPO after midnight   IV Fluids while NPO  Ice packs to right knee  Hospitalist called for medical co-management  Pain management as clinically indicated

## 2023-01-03 NOTE — ED PROVIDER NOTE - CLINICAL SUMMARY MEDICAL DECISION MAKING FREE TEXT BOX
Patient brought in by EMS for right knee pain status post fall.  Patient relates had right knee replacement on December 14 was heading to her orthopedic follow-up appointment today when she felt her legs lock up and then she started to fall was caught by family and lowered to the ground on her buttocks.  Patient relates her surgical wound reopened.  Patient denies head injury LOC headache neck pain back pain arm pain chest pain short of breath abdominal pain nausea vomiting.  Orthopedist Adriano  Plan EKG chest x-ray right knee x-ray labs morphine orthopedic consult

## 2023-01-03 NOTE — ED PROVIDER NOTE - ATTESTATION, MLM
Left message for patient to call back.    I have reviewed and confirmed nurses' notes for patient's medications, allergies, medical history, and surgical history.

## 2023-01-03 NOTE — ED PROVIDER NOTE - MUSCULOSKELETAL, MLM
R knee: +open incisional wound right anterior knee noted with mild bleeding, +surrounding swelling and tenderness R knee, toes warm & mobile, distal pulses and sensation intact, R hip NT, BUE and LLE NT with FROM, no C/T/L spine tenderness

## 2023-01-03 NOTE — ED ADULT NURSE NOTE - OBJECTIVE STATEMENT
Pt is alert and oriented. Pt states that she was going to the doctors office and fell outside on the concrete. Pt states that she had a patella repair on 12/14/22. Pt landed on her right knee and states that the leg bent under her. Pt has a laceration on the incision site. Bleeding controlled. Pt has significant swelling to the right knee. Ice applied to right knee. Pt has a brace on the right knee. Pt denies hitting her head or LOC. Pt states that she takes 81mg of asa per day. Pt has pain in the right knee. Pt states that she took oxycodone pta. Pt denies sob, chest pain, nausea, vomiting, and dizziness. Pt assisted by ems to stretcher.  Pt resp are even and unlabored, skin color francisco for race. Pt updated on plan of care. Pt is alert and oriented. Pt states that she was going to the doctors office and fell outside on the concrete. Pt states that she had a patella repair on 12/14/22. Pt landed on her right knee and states that the leg bent under her. Pt has a laceration on the incision site. Bleeding controlled. Pt has significant swelling to the right knee. Ice applied to right knee. Pt has a brace on the right knee. Pt denies hitting her head or LOC. Pt states that she takes 81mg of asa per day. Pt has pain in the right knee. Pt states that she took oxycodone pta. Pt denies sob, chest pain, nausea, vomiting, and dizziness. Pt assisted by ems to stretcher. Pt has a urostomy in place. Pt resp are even and unlabored, skin color francisco for race. Pt updated on plan of care.

## 2023-01-04 LAB
ANION GAP SERPL CALC-SCNC: 8 MMOL/L — SIGNIFICANT CHANGE UP (ref 5–17)
BASOPHILS # BLD AUTO: 0.04 K/UL — SIGNIFICANT CHANGE UP (ref 0–0.2)
BASOPHILS NFR BLD AUTO: 0.4 % — SIGNIFICANT CHANGE UP (ref 0–2)
BUN SERPL-MCNC: 34 MG/DL — HIGH (ref 7–23)
CALCIUM SERPL-MCNC: 8.9 MG/DL — SIGNIFICANT CHANGE UP (ref 8.4–10.5)
CHLORIDE SERPL-SCNC: 103 MMOL/L — SIGNIFICANT CHANGE UP (ref 96–108)
CO2 SERPL-SCNC: 26 MMOL/L — SIGNIFICANT CHANGE UP (ref 22–31)
CREAT SERPL-MCNC: 1.24 MG/DL — SIGNIFICANT CHANGE UP (ref 0.5–1.3)
EGFR: 46 ML/MIN/1.73M2 — LOW
EOSINOPHIL # BLD AUTO: 0.22 K/UL — SIGNIFICANT CHANGE UP (ref 0–0.5)
EOSINOPHIL NFR BLD AUTO: 2.3 % — SIGNIFICANT CHANGE UP (ref 0–6)
GLUCOSE SERPL-MCNC: 113 MG/DL — HIGH (ref 70–99)
HCT VFR BLD CALC: 27.7 % — LOW (ref 34.5–45)
HGB BLD-MCNC: 8.6 G/DL — LOW (ref 11.5–15.5)
IMM GRANULOCYTES NFR BLD AUTO: 0.3 % — SIGNIFICANT CHANGE UP (ref 0–0.9)
LYMPHOCYTES # BLD AUTO: 1.07 K/UL — SIGNIFICANT CHANGE UP (ref 1–3.3)
LYMPHOCYTES # BLD AUTO: 11.3 % — LOW (ref 13–44)
MCHC RBC-ENTMCNC: 31 GM/DL — LOW (ref 32–36)
MCHC RBC-ENTMCNC: 32.1 PG — SIGNIFICANT CHANGE UP (ref 27–34)
MCV RBC AUTO: 103.4 FL — HIGH (ref 80–100)
MONOCYTES # BLD AUTO: 0.87 K/UL — SIGNIFICANT CHANGE UP (ref 0–0.9)
MONOCYTES NFR BLD AUTO: 9.2 % — SIGNIFICANT CHANGE UP (ref 2–14)
NEUTROPHILS # BLD AUTO: 7.25 K/UL — SIGNIFICANT CHANGE UP (ref 1.8–7.4)
NEUTROPHILS NFR BLD AUTO: 76.5 % — SIGNIFICANT CHANGE UP (ref 43–77)
NRBC # BLD: 0 /100 WBCS — SIGNIFICANT CHANGE UP (ref 0–0)
PLATELET # BLD AUTO: 218 K/UL — SIGNIFICANT CHANGE UP (ref 150–400)
POTASSIUM SERPL-MCNC: 5 MMOL/L — SIGNIFICANT CHANGE UP (ref 3.5–5.3)
POTASSIUM SERPL-SCNC: 5 MMOL/L — SIGNIFICANT CHANGE UP (ref 3.5–5.3)
RBC # BLD: 2.68 M/UL — LOW (ref 3.8–5.2)
RBC # FLD: 15.1 % — HIGH (ref 10.3–14.5)
SODIUM SERPL-SCNC: 137 MMOL/L — SIGNIFICANT CHANGE UP (ref 135–145)
WBC # BLD: 9.48 K/UL — SIGNIFICANT CHANGE UP (ref 3.8–10.5)
WBC # FLD AUTO: 9.48 K/UL — SIGNIFICANT CHANGE UP (ref 3.8–10.5)

## 2023-01-04 PROCEDURE — 36415 COLL VENOUS BLD VENIPUNCTURE: CPT

## 2023-01-04 PROCEDURE — G0463: CPT

## 2023-01-04 PROCEDURE — 80048 BASIC METABOLIC PNL TOTAL CA: CPT

## 2023-01-04 PROCEDURE — C1713: CPT

## 2023-01-04 PROCEDURE — 73560 X-RAY EXAM OF KNEE 1 OR 2: CPT

## 2023-01-04 PROCEDURE — 97535 SELF CARE MNGMENT TRAINING: CPT

## 2023-01-04 PROCEDURE — 88311 DECALCIFY TISSUE: CPT

## 2023-01-04 PROCEDURE — 87070 CULTURE OTHR SPECIMN AEROBIC: CPT

## 2023-01-04 PROCEDURE — 85027 COMPLETE CBC AUTOMATED: CPT

## 2023-01-04 PROCEDURE — 87205 SMEAR GRAM STAIN: CPT

## 2023-01-04 PROCEDURE — 88300 SURGICAL PATH GROSS: CPT

## 2023-01-04 PROCEDURE — 88305 TISSUE EXAM BY PATHOLOGIST: CPT

## 2023-01-04 PROCEDURE — 97110 THERAPEUTIC EXERCISES: CPT

## 2023-01-04 PROCEDURE — C1776: CPT

## 2023-01-04 PROCEDURE — 93005 ELECTROCARDIOGRAM TRACING: CPT

## 2023-01-04 PROCEDURE — 97161 PT EVAL LOW COMPLEX 20 MIN: CPT

## 2023-01-04 PROCEDURE — 99233 SBSQ HOSP IP/OBS HIGH 50: CPT

## 2023-01-04 PROCEDURE — 87075 CULTR BACTERIA EXCEPT BLOOD: CPT

## 2023-01-04 PROCEDURE — 97165 OT EVAL LOW COMPLEX 30 MIN: CPT

## 2023-01-04 PROCEDURE — 97530 THERAPEUTIC ACTIVITIES: CPT

## 2023-01-04 PROCEDURE — C1889: CPT

## 2023-01-04 PROCEDURE — 97116 GAIT TRAINING THERAPY: CPT

## 2023-01-04 RX ORDER — ONDANSETRON 8 MG/1
4 TABLET, FILM COATED ORAL EVERY 8 HOURS
Refills: 0 | Status: DISCONTINUED | OUTPATIENT
Start: 2023-01-04 | End: 2023-01-06

## 2023-01-04 RX ORDER — ONDANSETRON 8 MG/1
4 TABLET, FILM COATED ORAL ONCE
Refills: 0 | Status: DISCONTINUED | OUTPATIENT
Start: 2023-01-04 | End: 2023-01-09

## 2023-01-04 RX ADMIN — PRAMIPEXOLE DIHYDROCHLORIDE 0.5 MILLIGRAM(S): 0.12 TABLET ORAL at 05:41

## 2023-01-04 RX ADMIN — ONDANSETRON 4 MILLIGRAM(S): 8 TABLET, FILM COATED ORAL at 05:40

## 2023-01-04 RX ADMIN — OXYCODONE HYDROCHLORIDE 10 MILLIGRAM(S): 5 TABLET ORAL at 02:21

## 2023-01-04 RX ADMIN — Medication 1000 MILLIGRAM(S): at 13:50

## 2023-01-04 RX ADMIN — Medication 81 MILLIGRAM(S): at 18:18

## 2023-01-04 RX ADMIN — Medication 1000 MILLIGRAM(S): at 21:38

## 2023-01-04 RX ADMIN — BRIMONIDINE TARTRATE 1 DROP(S): 2 SOLUTION/ DROPS OPHTHALMIC at 05:44

## 2023-01-04 RX ADMIN — Medication 250 MILLIGRAM(S): at 13:29

## 2023-01-04 RX ADMIN — CELECOXIB 200 MILLIGRAM(S): 200 CAPSULE ORAL at 21:38

## 2023-01-04 RX ADMIN — PANTOPRAZOLE SODIUM 40 MILLIGRAM(S): 20 TABLET, DELAYED RELEASE ORAL at 05:46

## 2023-01-04 RX ADMIN — BRIMONIDINE TARTRATE 1 DROP(S): 2 SOLUTION/ DROPS OPHTHALMIC at 18:18

## 2023-01-04 RX ADMIN — CARBIDOPA AND LEVODOPA 1 TABLET(S): 25; 100 TABLET ORAL at 05:56

## 2023-01-04 RX ADMIN — Medication 1000 MILLIGRAM(S): at 21:32

## 2023-01-04 RX ADMIN — OXYCODONE HYDROCHLORIDE 5 MILLIGRAM(S): 5 TABLET ORAL at 13:57

## 2023-01-04 RX ADMIN — Medication 125 MICROGRAM(S): at 05:42

## 2023-01-04 RX ADMIN — CARBIDOPA AND LEVODOPA 1 TABLET(S): 25; 100 TABLET ORAL at 18:18

## 2023-01-04 RX ADMIN — Medication 1000 MILLIGRAM(S): at 05:57

## 2023-01-04 RX ADMIN — CELECOXIB 200 MILLIGRAM(S): 200 CAPSULE ORAL at 08:33

## 2023-01-04 RX ADMIN — CITALOPRAM 10 MILLIGRAM(S): 10 TABLET, FILM COATED ORAL at 13:49

## 2023-01-04 RX ADMIN — Medication 250 MILLIGRAM(S): at 01:22

## 2023-01-04 RX ADMIN — CARBIDOPA AND LEVODOPA 1 TABLET(S): 25; 100 TABLET ORAL at 13:49

## 2023-01-04 RX ADMIN — PRAMIPEXOLE DIHYDROCHLORIDE 0.5 MILLIGRAM(S): 0.12 TABLET ORAL at 18:18

## 2023-01-04 RX ADMIN — CELECOXIB 200 MILLIGRAM(S): 200 CAPSULE ORAL at 21:31

## 2023-01-04 RX ADMIN — Medication 81 MILLIGRAM(S): at 05:56

## 2023-01-04 RX ADMIN — Medication 1000 MILLIGRAM(S): at 05:56

## 2023-01-04 RX ADMIN — PRAMIPEXOLE DIHYDROCHLORIDE 0.5 MILLIGRAM(S): 0.12 TABLET ORAL at 13:48

## 2023-01-04 NOTE — PROGRESS NOTE ADULT - SUBJECTIVE AND OBJECTIVE BOX
Procedure: s/p  TKR revision 12/14/22 (patella button and polyethylene liner  S: Pt without complaints. No SOB,CP, N/V. Tolerated Diet well.   Pain comfortable.  No BM yet, + flatus, No abdominal pain.  Pain Rx:  acetaminophen     Tablet .. 1000 milliGRAM(s) Oral every 8 hours  carbidopa/levodopa  25/100 1 Tablet(s) Oral four times a day  celecoxib 200 milliGRAM(s) Oral every 12 hours  citalopram 10 milliGRAM(s) Oral daily  ondansetron Injectable 4 milliGRAM(s) IV Push once  ondansetron Injectable 4 milliGRAM(s) IV Push every 8 hours PRN  oxyCODONE    IR 10 milliGRAM(s) Oral every 3 hours PRN  oxyCODONE    IR 5 milliGRAM(s) Oral every 3 hours PRN  pramipexole 0.5 milliGRAM(s) Oral four times a day    O: General: On exam, No Apparent Distress  Vital Signs Last 24 Hrs  T(C): 36.7 (04 Jan 2023 07:42), Max: 36.9 (03 Jan 2023 17:55)  T(F): 98.1 (04 Jan 2023 07:42), Max: 98.4 (03 Jan 2023 17:55)  HR: 84 (04 Jan 2023 07:42) (69 - 98)  BP: 107/56 (04 Jan 2023 07:42) (100/61 - 112/65)  BP(mean): --  RR: 20 (04 Jan 2023 07:42) (17 - 20)  SpO2: 97% (04 Jan 2023 07:42) (95% - 99%)    Parameters below as of 04 Jan 2023 07:42  Patient On (Oxygen Delivery Method): room air        Lungs: BS clear bilat.  Heart: RRR no murmur  Abdomen: + BS, soft , benign exam     Ext --right knee dressing changed. Steri strips dry.  No erythema. Redressed w/ gauze and ace  Neurologic:  Has sensation over feet & toes bilat. Full AROM bilat feet & toes. EHL/AT = 5/5  Vascular: Feet toes warm, pink. DP = 2+. No calf tenderness bilat..  VTEP: On Bilat. Venodynes + aspirin enteric coated 81 milliGRAM(s) Oral every 12 hours    I&O's Detail    03 Jan 2023 07:01  -  04 Jan 2023 07:00  --------------------------------------------------------  IN:  Total IN: 0 mL    OUT:    Urostomy (mL): 1100 mL  Total OUT: 1100 mL    Total NET: -1100 mL      Labs yesterday noted.  Hospitalist input noted.  Labs Today:                         8.6    9.48  )-----------( 218      ( 04 Jan 2023 08:17 )             27.7       01-04    137  |  103  |  34<H>  ----------------------------<  113<H>  5.0   |  26  |  1.24    Ca    8.9      04 Jan 2023 08:17    TPro  7.6  /  Alb  3.4  /  TBili  0.4  /  DBili  x   /  AST  23  /  ALT  10  /  AlkPhos  66  01-03      Primary Orthopedic Assessment:  • Stable from Orthopedic perspective  • Neuro motor exam stable  • Labs: CBC / Chem stable      Plan:   • Continue:  PT/OT/WBAT with assistance of a walker/Ice to knee/ Knee ROM         Incentive spirometry encouraged   • Continue DVT prophylaxis as prescribed, including use of compression devices and ankle pumps  • Continue Pain Rx  • Plans per Medicine / Anesthesia / Cardiology  • Discharge planning – anticipated discharge is Home D/C with home care & home PT /  Subacute Rehab facility when medically stable & cleared by PT/OT   Procedure: s/p  TKR revision 12/14/22 (patella button and polyethylene liner  S: Pt without complaints. No SOB,CP, N/V. Tolerated Diet well.   Pain comfortable.  No BM yet, + flatus, No abdominal pain.  Pain Rx:  acetaminophen     Tablet .. 1000 milliGRAM(s) Oral every 8 hours  carbidopa/levodopa  25/100 1 Tablet(s) Oral four times a day  celecoxib 200 milliGRAM(s) Oral every 12 hours  citalopram 10 milliGRAM(s) Oral daily  ondansetron Injectable 4 milliGRAM(s) IV Push once  ondansetron Injectable 4 milliGRAM(s) IV Push every 8 hours PRN  oxyCODONE    IR 10 milliGRAM(s) Oral every 3 hours PRN  oxyCODONE    IR 5 milliGRAM(s) Oral every 3 hours PRN  pramipexole 0.5 milliGRAM(s) Oral four times a day    O: General: On exam, No Apparent Distress  Vital Signs Last 24 Hrs  T(C): 36.7 (04 Jan 2023 07:42), Max: 36.9 (03 Jan 2023 17:55)  T(F): 98.1 (04 Jan 2023 07:42), Max: 98.4 (03 Jan 2023 17:55)  HR: 84 (04 Jan 2023 07:42) (69 - 98)  BP: 107/56 (04 Jan 2023 07:42) (100/61 - 112/65)  BP(mean): --  RR: 20 (04 Jan 2023 07:42) (17 - 20)  SpO2: 97% (04 Jan 2023 07:42) (95% - 99%)    Parameters below as of 04 Jan 2023 07:42  Patient On (Oxygen Delivery Method): room air        Lungs: BS clear bilat.  Heart: RRR no murmur  Abdomen: + BS, soft , benign exam     Ext --right knee dressing changed. Steri strips dry.  No erythema. Redressed w/ gauze and ace.  Unable to SLR on right in supine position.  Neurologic:  Has sensation over feet & toes bilat. Full AROM bilat feet & toes. EHL/AT = 5/5  Vascular: Feet toes warm, pink. DP = 2+. No calf tenderness bilat..  VTEP: On Bilat. Venodynes + aspirin enteric coated 81 milliGRAM(s) Oral every 12 hours    I&O's Detail    03 Jan 2023 07:01  -  04 Jan 2023 07:00  --------------------------------------------------------  IN:  Total IN: 0 mL    OUT:    Urostomy (mL): 1100 mL  Total OUT: 1100 mL    Total NET: -1100 mL      Labs yesterday noted.  Hospitalist input noted.  Labs Today:                         8.6    9.48  )-----------( 218      ( 04 Jan 2023 08:17 )             27.7       01-04    137  |  103  |  34<H>  ----------------------------<  113<H>  5.0   |  26  |  1.24    Ca    8.9      04 Jan 2023 08:17    TPro  7.6  /  Alb  3.4  /  TBili  0.4  /  DBili  x   /  AST  23  /  ALT  10  /  AlkPhos  66  01-03      Primary Orthopedic Assessment:  • Stable from Orthopedic perspective  • Neuro motor exam stable  • Labs: CBC / Chem stable  -- Xray shows patella tilted downward and slightly lower than postop film, pos quad rupture?      Plan:        Incentive spirometry encouraged   • Continue DVT prophylaxis as prescribed, including use of compression devices and ankle pumps  • Continue Pain Rx  • Plans per Medicine.  to be seen by Dr Oh later today  • May need MRI to fully assess quad tendon.

## 2023-01-04 NOTE — PROGRESS NOTE ADULT - SUBJECTIVE AND OBJECTIVE BOX
Subjective: Right knee with swelling and pain on lateral side.     MEDICATIONS  (STANDING):  acetaminophen     Tablet .. 1000 milliGRAM(s) Oral every 8 hours  aspirin enteric coated 81 milliGRAM(s) Oral every 12 hours  brimonidine 0.2% Ophthalmic Solution 1 Drop(s) Both EYES two times a day  carbidopa/levodopa  25/100 1 Tablet(s) Oral four times a day  celecoxib 200 milliGRAM(s) Oral every 12 hours  citalopram 10 milliGRAM(s) Oral daily  levothyroxine 125 MICROGram(s) Oral daily  ondansetron Injectable 4 milliGRAM(s) IV Push once  pantoprazole    Tablet 40 milliGRAM(s) Oral before breakfast  polyethylene glycol 3350 17 Gram(s) Oral at bedtime  pramipexole 0.5 milliGRAM(s) Oral four times a day  senna 2 Tablet(s) Oral at bedtime  vancomycin  IVPB 1000 milliGRAM(s) IV Intermittent every 12 hours    MEDICATIONS  (PRN):  ondansetron Injectable 4 milliGRAM(s) IV Push every 8 hours PRN Nausea and/or Vomiting  oxyCODONE    IR 10 milliGRAM(s) Oral every 3 hours PRN Severe Pain (7 - 10)  oxyCODONE    IR 5 milliGRAM(s) Oral every 3 hours PRN Moderate Pain (4 - 6)      Allergies    No Known Allergies    Intolerances        Vital Signs Last 24 Hrs  T(C): 36.8 (04 Jan 2023 15:13), Max: 36.9 (03 Jan 2023 17:55)  T(F): 98.2 (04 Jan 2023 15:13), Max: 98.4 (03 Jan 2023 17:55)  HR: 82 (04 Jan 2023 15:13) (69 - 92)  BP: 110/62 (04 Jan 2023 15:13) (102/69 - 112/65)  BP(mean): --  RR: 18 (04 Jan 2023 15:13) (18 - 20)  SpO2: 97% (04 Jan 2023 15:13) (95% - 97%)    Parameters below as of 04 Jan 2023 15:13  Patient On (Oxygen Delivery Method): room air        PHYSICAL EXAM:  GENERAL: NAD, well-groomed, well-developed  HEAD:  Atraumatic, Normocephalic  ENMT: Moist mucous membranes,   NECK: Supple, No JVD, Normal thyroid  NERVOUS SYSTEM:  All 4 extremities mobile, no gross sensory deficits.   CHEST/LUNG: Clear to auscultation bilaterally; No rales, rhonchi, wheezing, or rubs  HEART: Regular rate and rhythm; No murmurs, rubs, or gallops  ABDOMEN: Soft, Nontender, Nondistended; Bowel sounds present  EXTREMITIES:  2+ Peripheral Pulses, No clubbing, cyanosis, or edema      LABS:                        8.6    9.48  )-----------( 218      ( 04 Jan 2023 08:17 )             27.7     04 Jan 2023 08:17    137    |  103    |  34     ----------------------------<  113    5.0     |  26     |  1.24     Ca    8.9        04 Jan 2023 08:17      PT/INR - ( 03 Jan 2023 12:27 )   PT: 12.2 sec;   INR: 1.06 ratio         PTT - ( 03 Jan 2023 12:27 )  PTT:30.6 sec    CAPILLARY BLOOD GLUCOSE          RADIOLOGY & ADDITIONAL TESTS:    Imaging Personally Reviewed:  [ ] YES     Consultant(s) Notes Reviewed:      Care Discussed with Consultants/Other Providers:    Advanced Directives: [ ] DNR  [ ] No feeding tube  [ ] MOLST in chart  [ ] MOLST completed today  [ ] Unknown

## 2023-01-04 NOTE — PROGRESS NOTE ADULT - SUBJECTIVE AND OBJECTIVE BOX
Hospital Day [1 ]     Patient is a 72y old  Female who presents with a chief complaint of Fall due to parkinsons--  midwound dehisence right TKA wound (04 Jan 2023 13:10)      Patient resting without complaints.  No chest pain, SOB, N/V.    T(C): 36.8 (01-04-23 @ 15:13), Max: 36.9 (01-03-23 @ 17:55)  HR: 82 (01-04-23 @ 15:13) (69 - 98)  BP: 110/62 (01-04-23 @ 15:13) (100/61 - 112/65)  RR: 18 (01-04-23 @ 15:13) (17 - 20)  SpO2: 97% (01-04-23 @ 15:13) (95% - 99%)  Wt(kg): --    Exam:  Alert and Oriented, No Acute Distress      Lower Extremities:  Calves Soft, Non-tender bilaterally  +PF/DF/EHL/FHL  SILT  +DP Pulse  proximal wound has steri strips from dehiscence after recent fall  weakness noted with quad muscle testing  Xray:                           8.6    9.48  )-----------( 218      ( 04 Jan 2023 08:17 )             27.7    01-04    137  |  103  |  34<H>  ----------------------------<  113<H>  5.0   |  26  |  1.24    Ca    8.9      04 Jan 2023 08:17    TPro  7.6  /  Alb  3.4  /  TBili  0.4  /  DBili  x   /  AST  23  /  ALT  10  /  AlkPhos  66  01-03    Plan:  patient will be sent for an MRI of the right knee as per Dr Oh to rule out a quadrecip rupture

## 2023-01-05 ENCOUNTER — TRANSCRIPTION ENCOUNTER (OUTPATIENT)
Age: 73
End: 2023-01-05

## 2023-01-05 ENCOUNTER — OUTPATIENT (OUTPATIENT)
Dept: OUTPATIENT SERVICES | Facility: HOSPITAL | Age: 73
LOS: 1 days | End: 2023-01-05
Payer: COMMERCIAL

## 2023-01-05 DIAGNOSIS — Z96.659 PRESENCE OF UNSPECIFIED ARTIFICIAL KNEE JOINT: Chronic | ICD-10-CM

## 2023-01-05 DIAGNOSIS — Z98.89 OTHER SPECIFIED POSTPROCEDURAL STATES: Chronic | ICD-10-CM

## 2023-01-05 DIAGNOSIS — Z98.890 OTHER SPECIFIED POSTPROCEDURAL STATES: Chronic | ICD-10-CM

## 2023-01-05 DIAGNOSIS — Z00.00 ENCOUNTER FOR GENERAL ADULT MEDICAL EXAMINATION WITHOUT ABNORMAL FINDINGS: ICD-10-CM

## 2023-01-05 DIAGNOSIS — Z96.651 PRESENCE OF RIGHT ARTIFICIAL KNEE JOINT: Chronic | ICD-10-CM

## 2023-01-05 DIAGNOSIS — M25.561 PAIN IN RIGHT KNEE: Chronic | ICD-10-CM

## 2023-01-05 DIAGNOSIS — T84.019D BROKEN INTERNAL JOINT PROSTHESIS, UNSPECIFIED SITE, SUBSEQUENT ENCOUNTER: ICD-10-CM

## 2023-01-05 LAB
ANION GAP SERPL CALC-SCNC: 7 MMOL/L — SIGNIFICANT CHANGE UP (ref 5–17)
BASOPHILS # BLD AUTO: 0.05 K/UL — SIGNIFICANT CHANGE UP (ref 0–0.2)
BASOPHILS NFR BLD AUTO: 0.7 % — SIGNIFICANT CHANGE UP (ref 0–2)
BUN SERPL-MCNC: 29 MG/DL — HIGH (ref 7–23)
CALCIUM SERPL-MCNC: 8.6 MG/DL — SIGNIFICANT CHANGE UP (ref 8.4–10.5)
CHLORIDE SERPL-SCNC: 106 MMOL/L — SIGNIFICANT CHANGE UP (ref 96–108)
CO2 SERPL-SCNC: 27 MMOL/L — SIGNIFICANT CHANGE UP (ref 22–31)
CREAT SERPL-MCNC: 1.1 MG/DL — SIGNIFICANT CHANGE UP (ref 0.5–1.3)
EGFR: 53 ML/MIN/1.73M2 — LOW
EOSINOPHIL # BLD AUTO: 0.4 K/UL — SIGNIFICANT CHANGE UP (ref 0–0.5)
EOSINOPHIL NFR BLD AUTO: 5.6 % — SIGNIFICANT CHANGE UP (ref 0–6)
FOLATE SERPL-MCNC: >20 NG/ML — SIGNIFICANT CHANGE UP
GLUCOSE SERPL-MCNC: 104 MG/DL — HIGH (ref 70–99)
HCT VFR BLD CALC: 25.2 % — LOW (ref 34.5–45)
HGB BLD-MCNC: 7.9 G/DL — LOW (ref 11.5–15.5)
HGB BLD-MCNC: 8.7 G/DL — LOW (ref 11.5–15.5)
IMM GRANULOCYTES NFR BLD AUTO: 0.3 % — SIGNIFICANT CHANGE UP (ref 0–0.9)
LYMPHOCYTES # BLD AUTO: 0.88 K/UL — LOW (ref 1–3.3)
LYMPHOCYTES # BLD AUTO: 12.4 % — LOW (ref 13–44)
MCHC RBC-ENTMCNC: 31.3 GM/DL — LOW (ref 32–36)
MCHC RBC-ENTMCNC: 32.4 PG — SIGNIFICANT CHANGE UP (ref 27–34)
MCV RBC AUTO: 103.3 FL — HIGH (ref 80–100)
MONOCYTES # BLD AUTO: 0.65 K/UL — SIGNIFICANT CHANGE UP (ref 0–0.9)
MONOCYTES NFR BLD AUTO: 9.2 % — SIGNIFICANT CHANGE UP (ref 2–14)
NEUTROPHILS # BLD AUTO: 5.1 K/UL — SIGNIFICANT CHANGE UP (ref 1.8–7.4)
NEUTROPHILS NFR BLD AUTO: 71.8 % — SIGNIFICANT CHANGE UP (ref 43–77)
NRBC # BLD: 0 /100 WBCS — SIGNIFICANT CHANGE UP (ref 0–0)
PLATELET # BLD AUTO: 202 K/UL — SIGNIFICANT CHANGE UP (ref 150–400)
POTASSIUM SERPL-MCNC: 4.9 MMOL/L — SIGNIFICANT CHANGE UP (ref 3.5–5.3)
POTASSIUM SERPL-SCNC: 4.9 MMOL/L — SIGNIFICANT CHANGE UP (ref 3.5–5.3)
RBC # BLD: 2.44 M/UL — LOW (ref 3.8–5.2)
RBC # FLD: 15 % — HIGH (ref 10.3–14.5)
SODIUM SERPL-SCNC: 140 MMOL/L — SIGNIFICANT CHANGE UP (ref 135–145)
VIT B12 SERPL-MCNC: 308 PG/ML — SIGNIFICANT CHANGE UP (ref 232–1245)
WBC # BLD: 7.1 K/UL — SIGNIFICANT CHANGE UP (ref 3.8–10.5)
WBC # FLD AUTO: 7.1 K/UL — SIGNIFICANT CHANGE UP (ref 3.8–10.5)

## 2023-01-05 PROCEDURE — 73721 MRI JNT OF LWR EXTRE W/O DYE: CPT

## 2023-01-05 PROCEDURE — 99233 SBSQ HOSP IP/OBS HIGH 50: CPT

## 2023-01-05 PROCEDURE — 73721 MRI JNT OF LWR EXTRE W/O DYE: CPT | Mod: 26,RT,MH

## 2023-01-05 RX ORDER — DIPHENHYDRAMINE HCL 50 MG
25 CAPSULE ORAL EVERY 6 HOURS
Refills: 0 | Status: DISCONTINUED | OUTPATIENT
Start: 2023-01-05 | End: 2023-01-09

## 2023-01-05 RX ORDER — SODIUM CHLORIDE 9 MG/ML
1000 INJECTION, SOLUTION INTRAVENOUS
Refills: 0 | Status: DISCONTINUED | OUTPATIENT
Start: 2023-01-05 | End: 2023-01-09

## 2023-01-05 RX ADMIN — CELECOXIB 200 MILLIGRAM(S): 200 CAPSULE ORAL at 22:31

## 2023-01-05 RX ADMIN — BRIMONIDINE TARTRATE 1 DROP(S): 2 SOLUTION/ DROPS OPHTHALMIC at 18:58

## 2023-01-05 RX ADMIN — CELECOXIB 200 MILLIGRAM(S): 200 CAPSULE ORAL at 21:43

## 2023-01-05 RX ADMIN — PRAMIPEXOLE DIHYDROCHLORIDE 0.5 MILLIGRAM(S): 0.12 TABLET ORAL at 18:57

## 2023-01-05 RX ADMIN — CARBIDOPA AND LEVODOPA 1 TABLET(S): 25; 100 TABLET ORAL at 06:22

## 2023-01-05 RX ADMIN — PRAMIPEXOLE DIHYDROCHLORIDE 0.5 MILLIGRAM(S): 0.12 TABLET ORAL at 00:55

## 2023-01-05 RX ADMIN — Medication 1000 MILLIGRAM(S): at 06:25

## 2023-01-05 RX ADMIN — Medication 1000 MILLIGRAM(S): at 22:30

## 2023-01-05 RX ADMIN — BRIMONIDINE TARTRATE 1 DROP(S): 2 SOLUTION/ DROPS OPHTHALMIC at 06:23

## 2023-01-05 RX ADMIN — PRAMIPEXOLE DIHYDROCHLORIDE 0.5 MILLIGRAM(S): 0.12 TABLET ORAL at 23:51

## 2023-01-05 RX ADMIN — CARBIDOPA AND LEVODOPA 1 TABLET(S): 25; 100 TABLET ORAL at 18:58

## 2023-01-05 RX ADMIN — PANTOPRAZOLE SODIUM 40 MILLIGRAM(S): 20 TABLET, DELAYED RELEASE ORAL at 06:22

## 2023-01-05 RX ADMIN — Medication 125 MICROGRAM(S): at 06:22

## 2023-01-05 RX ADMIN — Medication 1000 MILLIGRAM(S): at 21:43

## 2023-01-05 RX ADMIN — Medication 1000 MILLIGRAM(S): at 06:23

## 2023-01-05 RX ADMIN — PRAMIPEXOLE DIHYDROCHLORIDE 0.5 MILLIGRAM(S): 0.12 TABLET ORAL at 06:23

## 2023-01-05 RX ADMIN — OXYCODONE HYDROCHLORIDE 5 MILLIGRAM(S): 5 TABLET ORAL at 12:14

## 2023-01-05 RX ADMIN — CARBIDOPA AND LEVODOPA 1 TABLET(S): 25; 100 TABLET ORAL at 00:56

## 2023-01-05 RX ADMIN — PRAMIPEXOLE DIHYDROCHLORIDE 0.5 MILLIGRAM(S): 0.12 TABLET ORAL at 11:24

## 2023-01-05 RX ADMIN — CARBIDOPA AND LEVODOPA 1 TABLET(S): 25; 100 TABLET ORAL at 11:24

## 2023-01-05 RX ADMIN — OXYCODONE HYDROCHLORIDE 5 MILLIGRAM(S): 5 TABLET ORAL at 11:30

## 2023-01-05 RX ADMIN — CELECOXIB 200 MILLIGRAM(S): 200 CAPSULE ORAL at 12:00

## 2023-01-05 RX ADMIN — CELECOXIB 200 MILLIGRAM(S): 200 CAPSULE ORAL at 11:24

## 2023-01-05 RX ADMIN — CARBIDOPA AND LEVODOPA 1 TABLET(S): 25; 100 TABLET ORAL at 23:51

## 2023-01-05 RX ADMIN — Medication 1000 MILLIGRAM(S): at 14:46

## 2023-01-05 RX ADMIN — Medication 81 MILLIGRAM(S): at 06:23

## 2023-01-05 RX ADMIN — Medication 25 MILLIGRAM(S): at 19:28

## 2023-01-05 RX ADMIN — CITALOPRAM 10 MILLIGRAM(S): 10 TABLET, FILM COATED ORAL at 11:24

## 2023-01-05 RX ADMIN — Medication 1000 MILLIGRAM(S): at 14:45

## 2023-01-05 NOTE — CHART NOTE - NSCHARTNOTEFT_GEN_A_CORE
Request from ortho pa to reassess fit of louise brace.  Pt c/o brace slipping down.  Brace removed and distal bars adjusted and straps reattached .  Pt more comportable following adjustemennts.  May need to add footplate  if it continues.  Follow up again after surgery 1/6/23    Sivakumar licona [place]  [phone]

## 2023-01-05 NOTE — PROGRESS NOTE ADULT - SUBJECTIVE AND OBJECTIVE BOX
Subjective: Pending MRI and waiting. No overnight events.     MEDICATIONS  (STANDING):  acetaminophen     Tablet .. 1000 milliGRAM(s) Oral every 8 hours  aspirin enteric coated 81 milliGRAM(s) Oral every 12 hours  brimonidine 0.2% Ophthalmic Solution 1 Drop(s) Both EYES two times a day  carbidopa/levodopa  25/100 1 Tablet(s) Oral four times a day  celecoxib 200 milliGRAM(s) Oral every 12 hours  citalopram 10 milliGRAM(s) Oral daily  levothyroxine 125 MICROGram(s) Oral daily  ondansetron Injectable 4 milliGRAM(s) IV Push once  pantoprazole    Tablet 40 milliGRAM(s) Oral before breakfast  polyethylene glycol 3350 17 Gram(s) Oral at bedtime  pramipexole 0.5 milliGRAM(s) Oral four times a day  senna 2 Tablet(s) Oral at bedtime    MEDICATIONS  (PRN):  ondansetron Injectable 4 milliGRAM(s) IV Push every 8 hours PRN Nausea and/or Vomiting  oxyCODONE    IR 10 milliGRAM(s) Oral every 3 hours PRN Severe Pain (7 - 10)  oxyCODONE    IR 5 milliGRAM(s) Oral every 3 hours PRN Moderate Pain (4 - 6)      Allergies    No Known Allergies    Intolerances        Vital Signs Last 24 Hrs  T(C): 36.7 (05 Jan 2023 08:09), Max: 36.8 (04 Jan 2023 15:13)  T(F): 98.1 (05 Jan 2023 08:09), Max: 98.3 (04 Jan 2023 23:30)  HR: 79 (05 Jan 2023 08:09) (78 - 90)  BP: 135/75 (05 Jan 2023 08:09) (101/62 - 135/75)  BP(mean): --  RR: 20 (05 Jan 2023 08:09) (18 - 20)  SpO2: 96% (05 Jan 2023 08:09) (96% - 98%)    Parameters below as of 05 Jan 2023 08:09  Patient On (Oxygen Delivery Method): room air        PHYSICAL EXAM:  GENERAL: NAD, well-groomed, well-developed  HEAD:  Atraumatic, Normocephalic  ENMT: Moist mucous membranes,   NECK: Supple, No JVD, Normal thyroid  NERVOUS SYSTEM:  All 4 extremities mobile, no gross sensory deficits.   CHEST/LUNG: Clear to auscultation bilaterally; No rales, rhonchi, wheezing, or rubs  HEART: Regular rate and rhythm; No murmurs, rubs, or gallops  ABDOMEN: Soft, Nontender, Nondistended; Bowel sounds present  EXTREMITIES:  2+ Peripheral Pulses, No clubbing, cyanosis, or edema      LABS:                        7.9    7.10  )-----------( 202      ( 05 Jan 2023 08:48 )             25.2     05 Jan 2023 08:48    140    |  106    |  29     ----------------------------<  104    4.9     |  27     |  1.10     Ca    8.6        05 Jan 2023 08:48          CAPILLARY BLOOD GLUCOSE          RADIOLOGY & ADDITIONAL TESTS:    Imaging Personally Reviewed:  [ ] YES     Consultant(s) Notes Reviewed:      Care Discussed with Consultants/Other Providers:    Advanced Directives: [ ] DNR  [ ] No feeding tube  [ ] MOLST in chart  [ ] MOLST completed today  [ ] Unknown

## 2023-01-05 NOTE — PROGRESS NOTE ADULT - SUBJECTIVE AND OBJECTIVE BOX
MRI results reviewed with Dr. Oh. Full thickness tear of right quadriceps tendon noted. Plan is for Right Quadriceps tendon repair tomorrow. NPO after midnight. Will give 1 unit of PRBC, given low H&H. Results and plan discussed at length with patient and  at bedside.

## 2023-01-05 NOTE — PROGRESS NOTE ADULT - SUBJECTIVE AND OBJECTIVE BOX
SUBJECTIVE    73yo Female status post fall.   Patient is alert and comfortable.    Pain is controlled with current pain regimen.  Denies nausea, vomiting, chest pain, shortness of breath, abdominal pain or fever.   No new complaints.    OBJECTIVE    Vital Signs Last 24 Hrs  T(C): 36.7 (05 Jan 2023 08:09), Max: 36.8 (04 Jan 2023 15:13)  T(F): 98.1 (05 Jan 2023 08:09), Max: 98.3 (04 Jan 2023 23:30)  HR: 79 (05 Jan 2023 08:09) (78 - 90)  BP: 135/75 (05 Jan 2023 08:09) (101/62 - 135/75)  BP(mean): --  RR: 20 (05 Jan 2023 08:09) (18 - 20)  SpO2: 96% (05 Jan 2023 08:09) (96% - 98%)    Parameters below as of 05 Jan 2023 08:09  Patient On (Oxygen Delivery Method): room air      I&O's Summary    04 Jan 2023 07:01  -  05 Jan 2023 07:00  --------------------------------------------------------  IN: 0 mL / OUT: 1000 mL / NET: -1000 mL        PHYSICAL EXAM    Right knee incision is clean and dry with steri strips intact  No erythema/ No exudate/ No blistering/ No ecchymosis.   The calf is supple/nontender.   Sensation to light touch is grossly intact distally.   Motor function distally is intact.   No foot drop.   (2+) dorsalis pedis pulse. Capillary refill is less than 2 seconds. No cyanosis.                          7.9<L>  7.10  )-----------( 202      ( 05 Jan 2023 08:48 )             25.2<L>  05 Jan 2023 08:48                        8.6<L>  9.48  )-----------( 218      ( 04 Jan 2023 08:17 )             27.7<L>  04 Jan 2023 08:17    05 Jan 2023 08:48    140    |  106    |  29<H>  ----------------------------<  104<H>  4.9     |  27     |  1.10   04 Jan 2023 08:17    137    |  103    |  34<H>  ----------------------------<  113<H>  5.0     |  26     |  1.24     Ca    8.6        05 Jan 2023 08:48  Ca    8.9        04 Jan 2023 08:17        ASSESSMENT AND PLAN    - Possible quad rupture: MRI pending  - DVT prophylaxis: PAS + Aspirin 81mg twice daily  - H&H trending down. Repeat in am, type and screen ordered  - Patient's Darrel brace to be re-assessed by orthotist, SANTIAGO Gipson, as she reports that it sometimes moves down toward her foot.  - Pain control as clinically indicated  - Disposition:  Home vs subacute rehabilitation pending hospital course

## 2023-01-06 ENCOUNTER — RESULT REVIEW (OUTPATIENT)
Age: 73
End: 2023-01-06

## 2023-01-06 ENCOUNTER — TRANSCRIPTION ENCOUNTER (OUTPATIENT)
Age: 73
End: 2023-01-06

## 2023-01-06 LAB
ANION GAP SERPL CALC-SCNC: 7 MMOL/L — SIGNIFICANT CHANGE UP (ref 5–17)
ANION GAP SERPL CALC-SCNC: 8 MMOL/L — SIGNIFICANT CHANGE UP (ref 5–17)
BUN SERPL-MCNC: 20 MG/DL — SIGNIFICANT CHANGE UP (ref 7–23)
BUN SERPL-MCNC: 24 MG/DL — HIGH (ref 7–23)
CALCIUM SERPL-MCNC: 8.4 MG/DL — SIGNIFICANT CHANGE UP (ref 8.4–10.5)
CALCIUM SERPL-MCNC: 8.8 MG/DL — SIGNIFICANT CHANGE UP (ref 8.4–10.5)
CHLORIDE SERPL-SCNC: 102 MMOL/L — SIGNIFICANT CHANGE UP (ref 96–108)
CHLORIDE SERPL-SCNC: 107 MMOL/L — SIGNIFICANT CHANGE UP (ref 96–108)
CO2 SERPL-SCNC: 26 MMOL/L — SIGNIFICANT CHANGE UP (ref 22–31)
CO2 SERPL-SCNC: 27 MMOL/L — SIGNIFICANT CHANGE UP (ref 22–31)
CREAT SERPL-MCNC: 0.95 MG/DL — SIGNIFICANT CHANGE UP (ref 0.5–1.3)
CREAT SERPL-MCNC: 1.04 MG/DL — SIGNIFICANT CHANGE UP (ref 0.5–1.3)
EGFR: 56 ML/MIN/1.73M2 — LOW
EGFR: 63 ML/MIN/1.73M2 — SIGNIFICANT CHANGE UP
FERRITIN SERPL-MCNC: 123 NG/ML — SIGNIFICANT CHANGE UP (ref 15–150)
FOLATE SERPL-MCNC: 18.8 NG/ML — SIGNIFICANT CHANGE UP
GLUCOSE SERPL-MCNC: 106 MG/DL — HIGH (ref 70–99)
GLUCOSE SERPL-MCNC: 154 MG/DL — HIGH (ref 70–99)
HCT VFR BLD CALC: 29 % — LOW (ref 34.5–45)
HCT VFR BLD CALC: 31.7 % — LOW (ref 34.5–45)
HGB BLD-MCNC: 10.2 G/DL — LOW (ref 11.5–15.5)
HGB BLD-MCNC: 9.1 G/DL — LOW (ref 11.5–15.5)
IRON SATN MFR SERPL: 15 % — SIGNIFICANT CHANGE UP (ref 14–50)
IRON SATN MFR SERPL: 50 UG/DL — SIGNIFICANT CHANGE UP (ref 30–160)
MCHC RBC-ENTMCNC: 31.4 GM/DL — LOW (ref 32–36)
MCHC RBC-ENTMCNC: 32.2 GM/DL — SIGNIFICANT CHANGE UP (ref 32–36)
MCHC RBC-ENTMCNC: 32.2 PG — SIGNIFICANT CHANGE UP (ref 27–34)
MCHC RBC-ENTMCNC: 32.3 PG — SIGNIFICANT CHANGE UP (ref 27–34)
MCV RBC AUTO: 100.3 FL — HIGH (ref 80–100)
MCV RBC AUTO: 102.5 FL — HIGH (ref 80–100)
NRBC # BLD: 0 /100 WBCS — SIGNIFICANT CHANGE UP (ref 0–0)
NRBC # BLD: 0 /100 WBCS — SIGNIFICANT CHANGE UP (ref 0–0)
PLATELET # BLD AUTO: 197 K/UL — SIGNIFICANT CHANGE UP (ref 150–400)
PLATELET # BLD AUTO: 229 K/UL — SIGNIFICANT CHANGE UP (ref 150–400)
POTASSIUM SERPL-MCNC: 4.5 MMOL/L — SIGNIFICANT CHANGE UP (ref 3.5–5.3)
POTASSIUM SERPL-MCNC: 4.7 MMOL/L — SIGNIFICANT CHANGE UP (ref 3.5–5.3)
POTASSIUM SERPL-SCNC: 4.5 MMOL/L — SIGNIFICANT CHANGE UP (ref 3.5–5.3)
POTASSIUM SERPL-SCNC: 4.7 MMOL/L — SIGNIFICANT CHANGE UP (ref 3.5–5.3)
RBC # BLD: 2.83 M/UL — LOW (ref 3.8–5.2)
RBC # BLD: 3.16 M/UL — LOW (ref 3.8–5.2)
RBC # FLD: 15.4 % — HIGH (ref 10.3–14.5)
RBC # FLD: 16.2 % — HIGH (ref 10.3–14.5)
SODIUM SERPL-SCNC: 136 MMOL/L — SIGNIFICANT CHANGE UP (ref 135–145)
SODIUM SERPL-SCNC: 141 MMOL/L — SIGNIFICANT CHANGE UP (ref 135–145)
TIBC SERPL-MCNC: 330 UG/DL — SIGNIFICANT CHANGE UP (ref 220–430)
UIBC SERPL-MCNC: 280 UG/DL — SIGNIFICANT CHANGE UP (ref 110–370)
VIT B12 SERPL-MCNC: 303 PG/ML — SIGNIFICANT CHANGE UP (ref 232–1245)
WBC # BLD: 12.68 K/UL — HIGH (ref 3.8–10.5)
WBC # BLD: 6.01 K/UL — SIGNIFICANT CHANGE UP (ref 3.8–10.5)
WBC # FLD AUTO: 12.68 K/UL — HIGH (ref 3.8–10.5)
WBC # FLD AUTO: 6.01 K/UL — SIGNIFICANT CHANGE UP (ref 3.8–10.5)

## 2023-01-06 PROCEDURE — 88300 SURGICAL PATH GROSS: CPT | Mod: 26

## 2023-01-06 PROCEDURE — 88305 TISSUE EXAM BY PATHOLOGIST: CPT | Mod: 26

## 2023-01-06 PROCEDURE — 99233 SBSQ HOSP IP/OBS HIGH 50: CPT

## 2023-01-06 PROCEDURE — 73560 X-RAY EXAM OF KNEE 1 OR 2: CPT | Mod: 26,RT

## 2023-01-06 PROCEDURE — 27386 REPAIR/GRAFT OF THIGH MUSCLE: CPT | Mod: AS,RT

## 2023-01-06 PROCEDURE — 27385 REPAIR OF THIGH MUSCLE: CPT | Mod: 79,RT

## 2023-01-06 DEVICE — ARTHREX SECONDARY FIXATION WITH PEEK SWIVELOCK ANCHOR 4.75 X 19.1MM: Type: IMPLANTABLE DEVICE | Status: FUNCTIONAL

## 2023-01-06 DEVICE — BEARING TIB VANGUARD VE PS 63/67X12MM: Type: IMPLANTABLE DEVICE | Status: FUNCTIONAL

## 2023-01-06 DEVICE — IMPLANTABLE DEVICE: Type: IMPLANTABLE DEVICE | Status: FUNCTIONAL

## 2023-01-06 DEVICE — MESH HERNIA MARLEX 10 X 14": Type: IMPLANTABLE DEVICE | Status: FUNCTIONAL

## 2023-01-06 DEVICE — BAR TIBIAL LOCKING MODULAR: Type: IMPLANTABLE DEVICE | Status: FUNCTIONAL

## 2023-01-06 DEVICE — BONE FILLER BIOCOMPOSITE STIMULAN RAPID CURE 10CC: Type: IMPLANTABLE DEVICE | Status: FUNCTIONAL

## 2023-01-06 RX ORDER — SODIUM CHLORIDE 9 MG/ML
1000 INJECTION, SOLUTION INTRAVENOUS
Refills: 0 | Status: DISCONTINUED | OUTPATIENT
Start: 2023-01-06 | End: 2023-01-06

## 2023-01-06 RX ORDER — SODIUM CHLORIDE 9 MG/ML
1000 INJECTION, SOLUTION INTRAVENOUS
Refills: 0 | Status: DISCONTINUED | OUTPATIENT
Start: 2023-01-06 | End: 2023-01-09

## 2023-01-06 RX ORDER — CEPHALEXIN 500 MG
250 CAPSULE ORAL EVERY 6 HOURS
Refills: 0 | Status: DISCONTINUED | OUTPATIENT
Start: 2023-01-07 | End: 2023-01-09

## 2023-01-06 RX ORDER — ACETAMINOPHEN 500 MG
1000 TABLET ORAL ONCE
Refills: 0 | Status: COMPLETED | OUTPATIENT
Start: 2023-01-06 | End: 2023-01-06

## 2023-01-06 RX ORDER — MAGNESIUM HYDROXIDE 400 MG/1
30 TABLET, CHEWABLE ORAL DAILY
Refills: 0 | Status: DISCONTINUED | OUTPATIENT
Start: 2023-01-06 | End: 2023-01-09

## 2023-01-06 RX ORDER — PANTOPRAZOLE SODIUM 20 MG/1
40 TABLET, DELAYED RELEASE ORAL
Refills: 0 | Status: DISCONTINUED | OUTPATIENT
Start: 2023-01-06 | End: 2023-01-09

## 2023-01-06 RX ORDER — CHLORHEXIDINE GLUCONATE 213 G/1000ML
1 SOLUTION TOPICAL ONCE
Refills: 0 | Status: COMPLETED | OUTPATIENT
Start: 2023-01-06 | End: 2023-01-06

## 2023-01-06 RX ORDER — HYDROMORPHONE HYDROCHLORIDE 2 MG/ML
0.5 INJECTION INTRAMUSCULAR; INTRAVENOUS; SUBCUTANEOUS
Refills: 0 | Status: DISCONTINUED | OUTPATIENT
Start: 2023-01-06 | End: 2023-01-06

## 2023-01-06 RX ORDER — VANCOMYCIN HCL 1 G
1750 VIAL (EA) INTRAVENOUS ONCE
Refills: 0 | Status: COMPLETED | OUTPATIENT
Start: 2023-01-07 | End: 2023-01-07

## 2023-01-06 RX ORDER — POLYETHYLENE GLYCOL 3350 17 G/17G
17 POWDER, FOR SOLUTION ORAL AT BEDTIME
Refills: 0 | Status: DISCONTINUED | OUTPATIENT
Start: 2023-01-06 | End: 2023-01-09

## 2023-01-06 RX ORDER — CITALOPRAM 10 MG/1
10 TABLET, FILM COATED ORAL DAILY
Refills: 0 | Status: DISCONTINUED | OUTPATIENT
Start: 2023-01-06 | End: 2023-01-09

## 2023-01-06 RX ORDER — APIXABAN 2.5 MG/1
2.5 TABLET, FILM COATED ORAL EVERY 12 HOURS
Refills: 0 | Status: DISCONTINUED | OUTPATIENT
Start: 2023-01-07 | End: 2023-01-09

## 2023-01-06 RX ORDER — ACETAMINOPHEN 500 MG
1000 TABLET ORAL ONCE
Refills: 0 | Status: COMPLETED | OUTPATIENT
Start: 2023-01-07 | End: 2023-01-07

## 2023-01-06 RX ORDER — OXYCODONE HYDROCHLORIDE 5 MG/1
5 TABLET ORAL ONCE
Refills: 0 | Status: DISCONTINUED | OUTPATIENT
Start: 2023-01-06 | End: 2023-01-06

## 2023-01-06 RX ORDER — CARBIDOPA AND LEVODOPA 25; 100 MG/1; MG/1
1 TABLET ORAL
Refills: 0 | Status: DISCONTINUED | OUTPATIENT
Start: 2023-01-06 | End: 2023-01-09

## 2023-01-06 RX ORDER — ONDANSETRON 8 MG/1
4 TABLET, FILM COATED ORAL EVERY 6 HOURS
Refills: 0 | Status: DISCONTINUED | OUTPATIENT
Start: 2023-01-06 | End: 2023-01-09

## 2023-01-06 RX ORDER — LEVOTHYROXINE SODIUM 125 MCG
125 TABLET ORAL DAILY
Refills: 0 | Status: DISCONTINUED | OUTPATIENT
Start: 2023-01-07 | End: 2023-01-09

## 2023-01-06 RX ORDER — APREPITANT 80 MG/1
40 CAPSULE ORAL ONCE
Refills: 0 | Status: COMPLETED | OUTPATIENT
Start: 2023-01-06 | End: 2023-01-06

## 2023-01-06 RX ORDER — OXYCODONE HYDROCHLORIDE 5 MG/1
10 TABLET ORAL
Refills: 0 | Status: DISCONTINUED | OUTPATIENT
Start: 2023-01-06 | End: 2023-01-09

## 2023-01-06 RX ORDER — HYDROMORPHONE HYDROCHLORIDE 2 MG/ML
1 INJECTION INTRAMUSCULAR; INTRAVENOUS; SUBCUTANEOUS
Refills: 0 | Status: DISCONTINUED | OUTPATIENT
Start: 2023-01-06 | End: 2023-01-06

## 2023-01-06 RX ORDER — PRAMIPEXOLE DIHYDROCHLORIDE 0.12 MG/1
0.5 TABLET ORAL
Refills: 0 | Status: DISCONTINUED | OUTPATIENT
Start: 2023-01-06 | End: 2023-01-09

## 2023-01-06 RX ORDER — ACETAMINOPHEN 500 MG
1000 TABLET ORAL EVERY 8 HOURS
Refills: 0 | Status: DISCONTINUED | OUTPATIENT
Start: 2023-01-07 | End: 2023-01-09

## 2023-01-06 RX ORDER — CELECOXIB 200 MG/1
200 CAPSULE ORAL EVERY 12 HOURS
Refills: 0 | Status: DISCONTINUED | OUTPATIENT
Start: 2023-01-07 | End: 2023-01-09

## 2023-01-06 RX ORDER — SENNA PLUS 8.6 MG/1
2 TABLET ORAL AT BEDTIME
Refills: 0 | Status: DISCONTINUED | OUTPATIENT
Start: 2023-01-06 | End: 2023-01-09

## 2023-01-06 RX ORDER — HYDROMORPHONE HYDROCHLORIDE 2 MG/ML
0.5 INJECTION INTRAMUSCULAR; INTRAVENOUS; SUBCUTANEOUS
Refills: 0 | Status: DISCONTINUED | OUTPATIENT
Start: 2023-01-06 | End: 2023-01-09

## 2023-01-06 RX ORDER — OXYCODONE HYDROCHLORIDE 5 MG/1
5 TABLET ORAL
Refills: 0 | Status: DISCONTINUED | OUTPATIENT
Start: 2023-01-06 | End: 2023-01-09

## 2023-01-06 RX ORDER — ONDANSETRON 8 MG/1
4 TABLET, FILM COATED ORAL ONCE
Refills: 0 | Status: DISCONTINUED | OUTPATIENT
Start: 2023-01-06 | End: 2023-01-06

## 2023-01-06 RX ORDER — VANCOMYCIN HCL 1 G
1750 VIAL (EA) INTRAVENOUS ONCE
Refills: 0 | Status: COMPLETED | OUTPATIENT
Start: 2023-01-06 | End: 2023-01-06

## 2023-01-06 RX ORDER — DEXAMETHASONE 0.5 MG/5ML
8 ELIXIR ORAL ONCE
Refills: 0 | Status: COMPLETED | OUTPATIENT
Start: 2023-01-07 | End: 2023-01-07

## 2023-01-06 RX ADMIN — Medication 1000 MILLIGRAM(S): at 21:27

## 2023-01-06 RX ADMIN — PANTOPRAZOLE SODIUM 40 MILLIGRAM(S): 20 TABLET, DELAYED RELEASE ORAL at 05:48

## 2023-01-06 RX ADMIN — Medication 400 MILLIGRAM(S): at 21:03

## 2023-01-06 RX ADMIN — Medication 250 MILLIGRAM(S): at 15:02

## 2023-01-06 RX ADMIN — CITALOPRAM 10 MILLIGRAM(S): 10 TABLET, FILM COATED ORAL at 12:28

## 2023-01-06 RX ADMIN — Medication 1000 MILLIGRAM(S): at 05:48

## 2023-01-06 RX ADMIN — PRAMIPEXOLE DIHYDROCHLORIDE 0.5 MILLIGRAM(S): 0.12 TABLET ORAL at 05:48

## 2023-01-06 RX ADMIN — HYDROMORPHONE HYDROCHLORIDE 1 MILLIGRAM(S): 2 INJECTION INTRAMUSCULAR; INTRAVENOUS; SUBCUTANEOUS at 20:38

## 2023-01-06 RX ADMIN — PRAMIPEXOLE DIHYDROCHLORIDE 0.5 MILLIGRAM(S): 0.12 TABLET ORAL at 12:28

## 2023-01-06 RX ADMIN — BRIMONIDINE TARTRATE 1 DROP(S): 2 SOLUTION/ DROPS OPHTHALMIC at 05:49

## 2023-01-06 RX ADMIN — Medication 25 MILLIGRAM(S): at 03:12

## 2023-01-06 RX ADMIN — HYDROMORPHONE HYDROCHLORIDE 1 MILLIGRAM(S): 2 INJECTION INTRAMUSCULAR; INTRAVENOUS; SUBCUTANEOUS at 21:38

## 2023-01-06 RX ADMIN — SODIUM CHLORIDE 125 MILLILITER(S): 9 INJECTION, SOLUTION INTRAVENOUS at 21:27

## 2023-01-06 RX ADMIN — HYDROMORPHONE HYDROCHLORIDE 1 MILLIGRAM(S): 2 INJECTION INTRAMUSCULAR; INTRAVENOUS; SUBCUTANEOUS at 21:51

## 2023-01-06 RX ADMIN — HYDROMORPHONE HYDROCHLORIDE 1 MILLIGRAM(S): 2 INJECTION INTRAMUSCULAR; INTRAVENOUS; SUBCUTANEOUS at 21:27

## 2023-01-06 RX ADMIN — SODIUM CHLORIDE 75 MILLILITER(S): 9 INJECTION, SOLUTION INTRAVENOUS at 09:31

## 2023-01-06 RX ADMIN — APREPITANT 40 MILLIGRAM(S): 80 CAPSULE ORAL at 14:58

## 2023-01-06 RX ADMIN — HYDROMORPHONE HYDROCHLORIDE 1 MILLIGRAM(S): 2 INJECTION INTRAMUSCULAR; INTRAVENOUS; SUBCUTANEOUS at 20:52

## 2023-01-06 RX ADMIN — CARBIDOPA AND LEVODOPA 1 TABLET(S): 25; 100 TABLET ORAL at 05:47

## 2023-01-06 RX ADMIN — OXYCODONE HYDROCHLORIDE 5 MILLIGRAM(S): 5 TABLET ORAL at 23:26

## 2023-01-06 RX ADMIN — CHLORHEXIDINE GLUCONATE 1 APPLICATION(S): 213 SOLUTION TOPICAL at 14:58

## 2023-01-06 RX ADMIN — CARBIDOPA AND LEVODOPA 1 TABLET(S): 25; 100 TABLET ORAL at 12:28

## 2023-01-06 RX ADMIN — PRAMIPEXOLE DIHYDROCHLORIDE 0.5 MILLIGRAM(S): 0.12 TABLET ORAL at 23:22

## 2023-01-06 RX ADMIN — Medication 1000 MILLIGRAM(S): at 05:52

## 2023-01-06 RX ADMIN — Medication 125 MICROGRAM(S): at 05:48

## 2023-01-06 RX ADMIN — CARBIDOPA AND LEVODOPA 1 TABLET(S): 25; 100 TABLET ORAL at 23:22

## 2023-01-06 RX ADMIN — HYDROMORPHONE HYDROCHLORIDE 1 MILLIGRAM(S): 2 INJECTION INTRAMUSCULAR; INTRAVENOUS; SUBCUTANEOUS at 21:01

## 2023-01-06 NOTE — DISCHARGE NOTE PROVIDER - DETAILS OF MALNUTRITION DIAGNOSIS/DIAGNOSES
This patient has been assessed with a concern for Malnutrition and was treated during this hospitalization for the following Nutrition diagnosis/diagnoses:     -  01/08/2023: Morbid obesity (BMI > 40)

## 2023-01-06 NOTE — BRIEF OPERATIVE NOTE - NSICDXBRIEFPREOP_GEN_ALL_CORE_FT
PRE-OP DIAGNOSIS:  Quadriceps tendon rupture, right, sequela 06-Jan-2023 20:25:14  Roxana Santillan

## 2023-01-06 NOTE — DISCHARGE NOTE PROVIDER - NSDCCPCAREPLAN_GEN_ALL_CORE_FT
PRINCIPAL DISCHARGE DIAGNOSIS  Diagnosis: Rupture of right quadriceps tendon  Assessment and Plan of Treatment: Knee immobilizer at all time, may remove for the shower  DO NOT flex the knee until seen in the office   -Your Activity  • Weight Bearing as tolerated in the knee immobilizer  • Take short, frequent walks increasing the distance that you walk each day as tolerated.  • Change your position every hour to decrease pain and stiffness.  • Continue the exercises taught to you by your physical therapist.  • No driving until cleared by the doctor.  • No tub baths, hot tubs, or swimming pools until instructed by your doctor.  • Do not squat down on the floor.  • Do not kneel or twist your knee.  Keep incision clean and dry.  Salves, ointments or other topical treatments are not to be used on the wound unless sepcifically recommended by your doctor.   If you have sutures (stiches) and no drainage form the incision you may shower allowing water to rinse the incision and pat it  dry afterward with a clean towel.  ASHA dressing: Safe to use dressing in shower as directed. Must disconnect battery pack from tubing and set aside somewhere dry. Once finished showering, pat dressing dry and reattach battery pack. Press orange button to resume therapy. Do not soak, scrub, or submerge incision in water. After 7 days remove entire dressing and dispose in trash. May leave incision open to air if no drainage.  If you have further questions or problems call your doctor's office or go to the emergency room.        SECONDARY DISCHARGE DIAGNOSES  Diagnosis: Fall  Assessment and Plan of Treatment:      PRINCIPAL DISCHARGE DIAGNOSIS  Diagnosis: Rupture of right quadriceps tendon  Assessment and Plan of Treatment: Knee immobilizer/ Barron Brace locked in FULL extension at all times  DO NOT flex the knee  DO NOT remove the Darrel Brace/Knee immobilizer    -Your Activity  • Weight Bearing as tolerated in the knee immobilizer/Darrel Brace  • Take short, frequent walks increasing the distance that you walk each day as tolerated.  • Change your position every hour to decrease pain and stiffness.  • Continue the exercises taught to you by your physical therapist.  • No driving until cleared by the doctor.  • No tub baths, hot tubs, or swimming pools until instructed by your doctor.  • Do not squat down on the floor.  • Do not kneel or twist your knee.  Keep incision clean and dry.  Salves, ointments or other topical treatments are not to be used on the wound unless sepcifically recommended by your doctor.   If you have sutures (stiches) and no drainage form the incision you may shower allowing water to rinse the incision and pat it  dry afterward with a clean towel.  ASHA dressing: Safe to use dressing in shower as directed. Must disconnect battery pack from tubing and set aside somewhere dry. Once finished showering, pat dressing dry and reattach battery pack. Press orange button to resume therapy. Do not soak, scrub, or submerge incision in water. After 7 days remove entire dressing and dispose in trash. May leave incision open to air if no drainage.  If you have further questions or problems call your doctor's office or go to the emergency room.        SECONDARY DISCHARGE DIAGNOSES  Diagnosis: Fall  Assessment and Plan of Treatment:

## 2023-01-06 NOTE — DISCHARGE NOTE PROVIDER - HOSPITAL COURSE
Patient is a 72y.o female with h/o Parkinson's, GERD, hypothyroid morbid obesity, and urostomy was  brought in to the ED by ambulance after fall today. Patient states she was going down the stairs on her way her follow up visit to Dr. Oh's office  when her legs locked and became tucked under her as she slowly fell to the ground. She had severe right knee pain and  noticed that her surgical incision was bleeding. Patient had a revision of her right TKR on 12/14/22 by Dr. Oh. Also complains of some right ankle discomfort. Denies head trauma, chest pain, SOB, nausea or any other trauma. Patient was admitted to [facility] for further management.  She was medically cleared to undergo elective procedure. Patient underwent Right Quadriceps tendon repair by Kerry on 1/6/2023.  No operative or karis-operative complications arose during patients hospital course.  Patient received antibiotic according to SCIP guidelines for infection prevention. Eliquis was given for DVT prophylaxis.  Anesthesia, Medical Hospitalist, Physical Therapy and Occupational Therapy were consulted. Patient is stable for discharge with a good prognosis.  Appropriate discharge instructions and medications are provided in this document.

## 2023-01-06 NOTE — DISCHARGE NOTE PROVIDER - NSDCFUADDINST_GEN_ALL_CORE_FT
- Call your doctor if you experience:  • An increase in pain not controlled by pain medication or change in activity or  position.  • Temperature greater than 101° F.  • Redness, increased swelling or foul smelling drainage from or around the  incision.  • Numbness, tingling or a change in color or temperature of the operative leg.  • Call your doctor immediately if you experience chest pain, shortness of breath or calf pain.   For Constipation :   • Increase your water intake. Drink at least 8 glasses of water daily.  • Try adding fiber to your diet by eating fruits, vegetables and foods that are rich in grains.  • If you do experience constipation, you may take an over-the-counter stool softener/laxative such as Esther Colace, Senekot or  Milk of Magnesia.

## 2023-01-06 NOTE — DISCHARGE NOTE PROVIDER - NSDCMRMEDTOKEN_GEN_ALL_CORE_FT
acetaminophen 500 mg oral tablet: 2 tab(s) orally every 8 hours  Aspirin Enteric Coated 81 mg oral delayed release tablet: Start1 tab orally every 12 hours for 14 days, once Eliquis is completed. Take aspirin 2 hours before Celebrex.  Darrel Brace:   brimonidine 0.2% ophthalmic solution: 1 drop(s) to each affected eye 2 times a day  carbidopa-levodopa 25 mg-100 mg oral tablet: 1 tab(s) orally 4 times a day  cefadroxil 500 mg oral capsule: 1 cap(s) orally every 12 hours   celecoxib 200 mg oral capsule: 1 cap orally every 12 hours.   citalopram 10 mg oral tablet: 1 tab(s) orally once a day  Eliquis 2.5 mg oral tablet: 1 tab orally every 12 hours for 14 days total post-op. Change to Aspirin 81mg every 12 hours for 14 days, once Eliquis is completed.   levothyroxine 125 mcg (0.125 mg) oral tablet: 1 tab(s) orally once a day  mupirocin 2% topical ointment: Apply topically to affected area 2 times a day  to nostrils until 12/17  omeprazole 20 mg oral delayed release capsule: 1 cap(s) orally once a day   oxyCODONE 5 mg oral tablet: 1-2 tab(s) orally every 4 hours, As Needed -for moderate to severe pain MDD:6  Reference #: [id number]  polyethylene glycol 3350 oral powder for reconstitution: 17 gram(s) orally once a day (at bedtime)  pramipexole 0.25 mg oral tablet: 2 tab(s) orally 4 times a day  senna leaf extract oral tablet: 2 tab(s) orally once a day (at bedtime)  Zetia 10 mg oral tablet: 1 tab(s) orally once a day

## 2023-01-06 NOTE — BRIEF OPERATIVE NOTE - NSICDXBRIEFPOSTOP_GEN_ALL_CORE_FT
POST-OP DIAGNOSIS:  Quadriceps tendon rupture, right, sequela 06-Jan-2023 20:25:30  Roxana Santillan

## 2023-01-06 NOTE — PROGRESS NOTE ADULT - SUBJECTIVE AND OBJECTIVE BOX
Subjective: Patient seen and examined. No overnight events. MRI confirms tear and will need operative management. Pain controlled as of now.     MEDICATIONS  (STANDING):  acetaminophen     Tablet .. 1000 milliGRAM(s) Oral every 8 hours  brimonidine 0.2% Ophthalmic Solution 1 Drop(s) Both EYES two times a day  carbidopa/levodopa  25/100 1 Tablet(s) Oral four times a day  celecoxib 200 milliGRAM(s) Oral every 12 hours  citalopram 10 milliGRAM(s) Oral daily  lactated ringers. 1000 milliLiter(s) (75 mL/Hr) IV Continuous <Continuous>  levothyroxine 125 MICROGram(s) Oral daily  ondansetron Injectable 4 milliGRAM(s) IV Push once  pantoprazole    Tablet 40 milliGRAM(s) Oral before breakfast  polyethylene glycol 3350 17 Gram(s) Oral at bedtime  pramipexole 0.5 milliGRAM(s) Oral four times a day  senna 2 Tablet(s) Oral at bedtime    MEDICATIONS  (PRN):  diphenhydrAMINE 25 milliGRAM(s) Oral every 6 hours PRN Rash and/or Itching  ondansetron Injectable 4 milliGRAM(s) IV Push every 8 hours PRN Nausea and/or Vomiting  oxyCODONE    IR 10 milliGRAM(s) Oral every 3 hours PRN Severe Pain (7 - 10)  oxyCODONE    IR 5 milliGRAM(s) Oral every 3 hours PRN Moderate Pain (4 - 6)      Allergies    No Known Allergies    Intolerances        Vital Signs Last 24 Hrs  T(C): 37.1 (06 Jan 2023 08:15), Max: 37.1 (06 Jan 2023 08:15)  T(F): 98.7 (06 Jan 2023 08:15), Max: 98.7 (06 Jan 2023 08:15)  HR: 83 (06 Jan 2023 08:15) (75 - 83)  BP: 129/71 (06 Jan 2023 08:15) (103/56 - 129/71)  BP(mean): --  RR: 20 (06 Jan 2023 08:15) (18 - 20)  SpO2: 98% (06 Jan 2023 08:15) (94% - 98%)    Parameters below as of 06 Jan 2023 08:15  Patient On (Oxygen Delivery Method): room air        PHYSICAL EXAM:  GENERAL: NAD, well-groomed, well-developed  HEAD:  Atraumatic, Normocephalic  ENMT: Moist mucous membranes,   NECK: Supple, No JVD, Normal thyroid  NERVOUS SYSTEM:  All 4 extremities mobile, no gross sensory deficits.   CHEST/LUNG: Clear to auscultation bilaterally; No rales, rhonchi, wheezing, or rubs  HEART: Regular rate and rhythm; No murmurs, rubs, or gallops  ABDOMEN: Soft, Nontender, Nondistended; Bowel sounds present  EXTREMITIES:  2+ Peripheral Pulses, No clubbing, cyanosis, or edema      LABS:                        9.1    6.01  )-----------( 197      ( 06 Jan 2023 06:00 )             29.0     06 Jan 2023 06:00    141    |  107    |  24     ----------------------------<  106    4.7     |  27     |  1.04     Ca    8.8        06 Jan 2023 06:00          CAPILLARY BLOOD GLUCOSE          RADIOLOGY & ADDITIONAL TESTS:    Imaging Personally Reviewed:  [ ] YES     Consultant(s) Notes Reviewed:      Care Discussed with Consultants/Other Providers:    Advanced Directives: [ ] DNR  [ ] No feeding tube  [ ] MOLST in chart  [ ] MOLST completed today  [ ] Unknown

## 2023-01-06 NOTE — DISCHARGE NOTE PROVIDER - NSDCFUSCHEDAPPT_GEN_ALL_CORE_FT
Soy Oh  [facility]  SYOP PAT-Pre-Surgical Testing  Scheduled Appointment: 01/10/2023    Soy Oh  [facility]  ORTHOSUR [address]  Scheduled Appointment: 01/24/2023

## 2023-01-06 NOTE — DISCHARGE NOTE PROVIDER - CARE PROVIDER_API CALL
Soy Oh)  Orthopedics  [address], [address]  [place] [address]  Phone: [phone]  Fax: [phone]  Scheduled Appointment: 01/24/2023 11:30 AM

## 2023-01-07 LAB
ANION GAP SERPL CALC-SCNC: 9 MMOL/L — SIGNIFICANT CHANGE UP (ref 5–17)
BUN SERPL-MCNC: 24 MG/DL — HIGH (ref 7–23)
CALCIUM SERPL-MCNC: 8.3 MG/DL — LOW (ref 8.4–10.5)
CHLORIDE SERPL-SCNC: 100 MMOL/L — SIGNIFICANT CHANGE UP (ref 96–108)
CO2 SERPL-SCNC: 26 MMOL/L — SIGNIFICANT CHANGE UP (ref 22–31)
CREAT SERPL-MCNC: 1.14 MG/DL — SIGNIFICANT CHANGE UP (ref 0.5–1.3)
EGFR: 51 ML/MIN/1.73M2 — LOW
GLUCOSE SERPL-MCNC: 141 MG/DL — HIGH (ref 70–99)
HCT VFR BLD CALC: 28.3 % — LOW (ref 34.5–45)
HGB BLD-MCNC: 8.8 G/DL — LOW (ref 11.5–15.5)
MCHC RBC-ENTMCNC: 31.1 GM/DL — LOW (ref 32–36)
MCHC RBC-ENTMCNC: 31.5 PG — SIGNIFICANT CHANGE UP (ref 27–34)
MCV RBC AUTO: 101.4 FL — HIGH (ref 80–100)
NRBC # BLD: 0 /100 WBCS — SIGNIFICANT CHANGE UP (ref 0–0)
PLATELET # BLD AUTO: 238 K/UL — SIGNIFICANT CHANGE UP (ref 150–400)
POTASSIUM SERPL-MCNC: 5.3 MMOL/L — SIGNIFICANT CHANGE UP (ref 3.5–5.3)
POTASSIUM SERPL-SCNC: 5.3 MMOL/L — SIGNIFICANT CHANGE UP (ref 3.5–5.3)
RBC # BLD: 2.79 M/UL — LOW (ref 3.8–5.2)
RBC # FLD: 15.4 % — HIGH (ref 10.3–14.5)
SODIUM SERPL-SCNC: 135 MMOL/L — SIGNIFICANT CHANGE UP (ref 135–145)
WBC # BLD: 8.24 K/UL — SIGNIFICANT CHANGE UP (ref 3.8–10.5)
WBC # FLD AUTO: 8.24 K/UL — SIGNIFICANT CHANGE UP (ref 3.8–10.5)

## 2023-01-07 PROCEDURE — 99233 SBSQ HOSP IP/OBS HIGH 50: CPT

## 2023-01-07 RX ORDER — BENZOCAINE AND MENTHOL 5; 1 G/100ML; G/100ML
1 LIQUID ORAL ONCE
Refills: 0 | Status: DISCONTINUED | OUTPATIENT
Start: 2023-01-07 | End: 2023-01-07

## 2023-01-07 RX ORDER — ALPRAZOLAM 0.25 MG
0.25 TABLET ORAL EVERY 12 HOURS
Refills: 0 | Status: DISCONTINUED | OUTPATIENT
Start: 2023-01-07 | End: 2023-01-08

## 2023-01-07 RX ADMIN — PANTOPRAZOLE SODIUM 40 MILLIGRAM(S): 20 TABLET, DELAYED RELEASE ORAL at 06:30

## 2023-01-07 RX ADMIN — Medication 250 MILLIGRAM(S): at 18:12

## 2023-01-07 RX ADMIN — OXYCODONE HYDROCHLORIDE 5 MILLIGRAM(S): 5 TABLET ORAL at 16:36

## 2023-01-07 RX ADMIN — Medication 1000 MILLIGRAM(S): at 21:45

## 2023-01-07 RX ADMIN — Medication 1000 MILLIGRAM(S): at 14:54

## 2023-01-07 RX ADMIN — Medication 0.25 MILLIGRAM(S): at 21:59

## 2023-01-07 RX ADMIN — CITALOPRAM 10 MILLIGRAM(S): 10 TABLET, FILM COATED ORAL at 12:34

## 2023-01-07 RX ADMIN — Medication 1000 MILLIGRAM(S): at 15:06

## 2023-01-07 RX ADMIN — BRIMONIDINE TARTRATE 1 DROP(S): 2 SOLUTION/ DROPS OPHTHALMIC at 06:32

## 2023-01-07 RX ADMIN — Medication 250 MILLIGRAM(S): at 12:34

## 2023-01-07 RX ADMIN — Medication 125 MICROGRAM(S): at 06:30

## 2023-01-07 RX ADMIN — CELECOXIB 200 MILLIGRAM(S): 200 CAPSULE ORAL at 10:38

## 2023-01-07 RX ADMIN — SENNA PLUS 2 TABLET(S): 8.6 TABLET ORAL at 21:36

## 2023-01-07 RX ADMIN — Medication 250 MILLIGRAM(S): at 06:30

## 2023-01-07 RX ADMIN — OXYCODONE HYDROCHLORIDE 5 MILLIGRAM(S): 5 TABLET ORAL at 03:53

## 2023-01-07 RX ADMIN — PRAMIPEXOLE DIHYDROCHLORIDE 0.5 MILLIGRAM(S): 0.12 TABLET ORAL at 06:30

## 2023-01-07 RX ADMIN — Medication 1000 MILLIGRAM(S): at 06:30

## 2023-01-07 RX ADMIN — CELECOXIB 200 MILLIGRAM(S): 200 CAPSULE ORAL at 09:51

## 2023-01-07 RX ADMIN — OXYCODONE HYDROCHLORIDE 5 MILLIGRAM(S): 5 TABLET ORAL at 17:06

## 2023-01-07 RX ADMIN — Medication 1000 MILLIGRAM(S): at 21:36

## 2023-01-07 RX ADMIN — Medication 101.6 MILLIGRAM(S): at 06:31

## 2023-01-07 RX ADMIN — PRAMIPEXOLE DIHYDROCHLORIDE 0.5 MILLIGRAM(S): 0.12 TABLET ORAL at 12:35

## 2023-01-07 RX ADMIN — BRIMONIDINE TARTRATE 1 DROP(S): 2 SOLUTION/ DROPS OPHTHALMIC at 18:12

## 2023-01-07 RX ADMIN — CARBIDOPA AND LEVODOPA 1 TABLET(S): 25; 100 TABLET ORAL at 12:41

## 2023-01-07 RX ADMIN — OXYCODONE HYDROCHLORIDE 5 MILLIGRAM(S): 5 TABLET ORAL at 04:45

## 2023-01-07 RX ADMIN — Medication 400 MILLIGRAM(S): at 02:54

## 2023-01-07 RX ADMIN — Medication 250 MILLIGRAM(S): at 02:59

## 2023-01-07 RX ADMIN — Medication 1000 MILLIGRAM(S): at 06:32

## 2023-01-07 RX ADMIN — POLYETHYLENE GLYCOL 3350 17 GRAM(S): 17 POWDER, FOR SOLUTION ORAL at 21:36

## 2023-01-07 RX ADMIN — CITALOPRAM 10 MILLIGRAM(S): 10 TABLET, FILM COATED ORAL at 12:41

## 2023-01-07 RX ADMIN — APIXABAN 2.5 MILLIGRAM(S): 2.5 TABLET, FILM COATED ORAL at 06:31

## 2023-01-07 RX ADMIN — CELECOXIB 200 MILLIGRAM(S): 200 CAPSULE ORAL at 21:36

## 2023-01-07 RX ADMIN — CARBIDOPA AND LEVODOPA 1 TABLET(S): 25; 100 TABLET ORAL at 06:30

## 2023-01-07 RX ADMIN — CELECOXIB 200 MILLIGRAM(S): 200 CAPSULE ORAL at 21:45

## 2023-01-07 RX ADMIN — CARBIDOPA AND LEVODOPA 1 TABLET(S): 25; 100 TABLET ORAL at 18:11

## 2023-01-07 RX ADMIN — PRAMIPEXOLE DIHYDROCHLORIDE 0.5 MILLIGRAM(S): 0.12 TABLET ORAL at 18:11

## 2023-01-07 NOTE — PROGRESS NOTE ADULT - SUBJECTIVE AND OBJECTIVE BOX
POST OPERATIVE DAY #:  [1 ]   STATUS POST: [ ] Left [x ] Right  [x ]TKR [ ]THR                        Patient is a 72y old  Female who presents with a chief complaint of Right quadriceps tendon tear (06 Jan 2023 20:33)      Pain well controlled    OBJECTIVE:     Vital Signs Last 24 Hrs  T(C): 36.5 (07 Jan 2023 08:15), Max: 37.1 (07 Jan 2023 05:08)  T(F): 97.7 (07 Jan 2023 08:15), Max: 98.8 (07 Jan 2023 05:08)  HR: 81 (07 Jan 2023 08:15) (76 - 96)  BP: 135/75 (07 Jan 2023 08:15) (90/48 - 155/71)  BP(mean): --  RR: 18 (07 Jan 2023 08:15) (14 - 19)  SpO2: 99% (07 Jan 2023 08:15) (97% - 100%)    Parameters below as of 07 Jan 2023 08:15  Patient On (Oxygen Delivery Method): room air        Affected extremity:          Dressing:  clean/dry/intact w immob         Sensation; intact to light touch          Motor exam: Toes warm and mobile        No calf tenderness bilateral LE's    LABS:                        8.8    8.24  )-----------( 238      ( 07 Jan 2023 07:23 )             28.3     01-07    135  |  100  |  24<H>  ----------------------------<  141<H>  5.3   |  26  |  1.14    Ca    8.3<L>      07 Jan 2023 07:23              A/P :    -    Analgesics PRN  -    DVT ppx: [ ]ASA 81 bid [ ] Lovenox [ ] Coumadin   [ x] Eliquis   -    Weight bearing status: WBAT [x ]        PWB    [ ]     TTWB  [ ]      NWB  [ ]  -    Physical Therapy  -    Occupational Therapy  -    Dispo: Home [ ]     Rehab [ ]      SHAILESH [ ]      To be determined [x]

## 2023-01-07 NOTE — PHYSICAL THERAPY INITIAL EVALUATION ADULT - ACTIVE RANGE OF MOTION EXAMINATION, REHAB EVAL
LLE Active ROM was WNL (within normal limits)/bilateral upper extremity Active ROM was WFL (within functional limits)

## 2023-01-07 NOTE — PHYSICAL THERAPY INITIAL EVALUATION ADULT - ADDITIONAL COMMENTS
Lives with her  in a house with 1 step to enter, None inside. Pt has a cane, RW and RTS but did not use prior to surgery. Pt's  will be available to assist pt as needed at home.

## 2023-01-07 NOTE — PROGRESS NOTE ADULT - SUBJECTIVE AND OBJECTIVE BOX
Subjective: Was nervous that she may have injured her leg earlier today with PT.  No pain currently.     MEDICATIONS  (STANDING):  acetaminophen     Tablet .. 1000 milliGRAM(s) Oral every 8 hours  apixaban 2.5 milliGRAM(s) Oral every 12 hours  brimonidine 0.2% Ophthalmic Solution 1 Drop(s) Both EYES two times a day  carbidopa/levodopa  25/100 1 Tablet(s) Oral four times a day  carbidopa/levodopa  25/100 1 Tablet(s) Oral four times a day  celecoxib 200 milliGRAM(s) Oral every 12 hours  cephalexin 250 milliGRAM(s) Oral every 6 hours  citalopram 10 milliGRAM(s) Oral daily  citalopram 10 milliGRAM(s) Oral daily  lactated ringers. 1000 milliLiter(s) (125 mL/Hr) IV Continuous <Continuous>  lactated ringers. 1000 milliLiter(s) (75 mL/Hr) IV Continuous <Continuous>  lactated ringers. 1000 milliLiter(s) (75 mL/Hr) IV Continuous <Continuous>  levothyroxine 125 MICROGram(s) Oral daily  ondansetron Injectable 4 milliGRAM(s) IV Push once  pantoprazole    Tablet 40 milliGRAM(s) Oral before breakfast  polyethylene glycol 3350 17 Gram(s) Oral at bedtime  pramipexole 0.5 milliGRAM(s) Oral four times a day  senna 2 Tablet(s) Oral at bedtime    MEDICATIONS  (PRN):  aluminum hydroxide/magnesium hydroxide/simethicone Suspension 30 milliLiter(s) Oral four times a day PRN Indigestion  diphenhydrAMINE 25 milliGRAM(s) Oral every 6 hours PRN Rash and/or Itching  HYDROmorphone  Injectable 0.5 milliGRAM(s) IV Push every 3 hours PRN for breakthrough pain  magnesium hydroxide Suspension 30 milliLiter(s) Oral daily PRN Constipation  ondansetron Injectable 4 milliGRAM(s) IV Push every 6 hours PRN Nausea and/or Vomiting  oxyCODONE    IR 5 milliGRAM(s) Oral every 3 hours PRN Moderate Pain (4 - 6)  oxyCODONE    IR 10 milliGRAM(s) Oral every 3 hours PRN Severe Pain (7 - 10)      Allergies    [This allergen will not trigger allergy alert] rosy dressing (Rash)  No Known Drug Allergies    Intolerances        Vital Signs Last 24 Hrs  T(C): 36.5 (07 Jan 2023 08:15), Max: 37.1 (07 Jan 2023 05:08)  T(F): 97.7 (07 Jan 2023 08:15), Max: 98.8 (07 Jan 2023 05:08)  HR: 81 (07 Jan 2023 08:15) (76 - 96)  BP: 135/75 (07 Jan 2023 08:15) (90/48 - 155/71)  BP(mean): --  RR: 18 (07 Jan 2023 08:15) (14 - 19)  SpO2: 99% (07 Jan 2023 08:15) (97% - 100%)    Parameters below as of 07 Jan 2023 08:15  Patient On (Oxygen Delivery Method): room air        PHYSICAL EXAM:  GENERAL: NAD, well-groomed, well-developed  HEAD:  Atraumatic, Normocephalic  ENMT: Moist mucous membranes,   NECK: Supple, No JVD, Normal thyroid  NERVOUS SYSTEM:  All 4 extremities mobile, no gross sensory deficits.   CHEST/LUNG: Clear to auscultation bilaterally; No rales, rhonchi, wheezing, or rubs  HEART: Regular rate and rhythm; No murmurs, rubs, or gallops  ABDOMEN: Soft, Nontender, Nondistended; Bowel sounds present  EXTREMITIES:  2+ Peripheral Pulses, No clubbing, cyanosis, or edema      LABS:                        8.8    8.24  )-----------( 238      ( 07 Jan 2023 07:23 )             28.3     07 Jan 2023 07:23    135    |  100    |  24     ----------------------------<  141    5.3     |  26     |  1.14     Ca    8.3        07 Jan 2023 07:23          CAPILLARY BLOOD GLUCOSE          RADIOLOGY & ADDITIONAL TESTS:    Imaging Personally Reviewed:  [ ] YES     Consultant(s) Notes Reviewed:      Care Discussed with Consultants/Other Providers:    Advanced Directives: [ ] DNR  [ ] No feeding tube  [ ] MOLST in chart  [ ] MOLST completed today  [ ] Unknown

## 2023-01-07 NOTE — PHYSICAL THERAPY INITIAL EVALUATION ADULT - PERTINENT HX OF CURRENT PROBLEM, REHAB EVAL
Patient is a 72y.o female with h/o Parkinson's, GERD, hypothyroid morbid obesity, and urostomy was  brought in to the ED by ambulance after fall today. Patient states she was going down the stairs on her way her follow up visit to Dr. Oh's office  when her legs locked and became tucked under her as she slowly fell to the ground. She had severe right knee pain and  noticed that her surgical incision was bleeding. Patient had a revision of her right TKR on 12/14/22 by Dr. Oh

## 2023-01-07 NOTE — PHYSICAL THERAPY INITIAL EVALUATION ADULT - NSPTDISCHREC_GEN_A_CORE
Pt would benefit from short term stay in [facility] to increased strength and function to decrease future risk of falling again./Sub-acute Rehab

## 2023-01-07 NOTE — PHYSICAL THERAPY INITIAL EVALUATION ADULT - PASSIVE RANGE OF MOTION EXAMINATION, REHAB EVAL
Left LE Passive ROM was WNL (within normal limits)/bilateral upper extremity Passive ROM was WFL (within functional limits)

## 2023-01-07 NOTE — PHYSICAL THERAPY INITIAL EVALUATION ADULT - RANGE OF MOTION EXAMINATION, REHAB EVAL
right LE not tested/Left LE ROM was WNL (within normal limits)/bilateral upper extremity ROM was WFL (within functional limits)

## 2023-01-07 NOTE — OCCUPATIONAL THERAPY INITIAL EVALUATION ADULT - PERTINENT HX OF CURRENT PROBLEM, REHAB EVAL
Patient is a 72y.o female with h/o Parkinson's, GERD, hypothyroid morbid obesity, and urostomy was  brought in to the ED by ambulance after fall today. Patient states she was going down the stairs on her way her follow up visit to Dr. Oh's office  when her legs locked and became tucked under her as she slowly fell to the ground. She had severe right knee pain and  noticed that her surgical incision was bleeding. Patient had a revision of her right TKR on 12/14/22 by Dr. Oh  S/P QTR

## 2023-01-08 PROCEDURE — 99233 SBSQ HOSP IP/OBS HIGH 50: CPT

## 2023-01-08 RX ORDER — ALPRAZOLAM 0.25 MG
0.5 TABLET ORAL THREE TIMES A DAY
Refills: 0 | Status: DISCONTINUED | OUTPATIENT
Start: 2023-01-08 | End: 2023-01-09

## 2023-01-08 RX ADMIN — CELECOXIB 200 MILLIGRAM(S): 200 CAPSULE ORAL at 21:27

## 2023-01-08 RX ADMIN — CARBIDOPA AND LEVODOPA 1 TABLET(S): 25; 100 TABLET ORAL at 12:39

## 2023-01-08 RX ADMIN — CELECOXIB 200 MILLIGRAM(S): 200 CAPSULE ORAL at 09:43

## 2023-01-08 RX ADMIN — PRAMIPEXOLE DIHYDROCHLORIDE 0.5 MILLIGRAM(S): 0.12 TABLET ORAL at 17:37

## 2023-01-08 RX ADMIN — Medication 250 MILLIGRAM(S): at 00:13

## 2023-01-08 RX ADMIN — CELECOXIB 200 MILLIGRAM(S): 200 CAPSULE ORAL at 10:46

## 2023-01-08 RX ADMIN — BRIMONIDINE TARTRATE 1 DROP(S): 2 SOLUTION/ DROPS OPHTHALMIC at 17:36

## 2023-01-08 RX ADMIN — Medication 250 MILLIGRAM(S): at 17:37

## 2023-01-08 RX ADMIN — OXYCODONE HYDROCHLORIDE 5 MILLIGRAM(S): 5 TABLET ORAL at 10:40

## 2023-01-08 RX ADMIN — Medication 0.5 MILLIGRAM(S): at 09:42

## 2023-01-08 RX ADMIN — OXYCODONE HYDROCHLORIDE 5 MILLIGRAM(S): 5 TABLET ORAL at 23:52

## 2023-01-08 RX ADMIN — CARBIDOPA AND LEVODOPA 1 TABLET(S): 25; 100 TABLET ORAL at 00:13

## 2023-01-08 RX ADMIN — PRAMIPEXOLE DIHYDROCHLORIDE 0.5 MILLIGRAM(S): 0.12 TABLET ORAL at 12:38

## 2023-01-08 RX ADMIN — BRIMONIDINE TARTRATE 1 DROP(S): 2 SOLUTION/ DROPS OPHTHALMIC at 05:42

## 2023-01-08 RX ADMIN — SENNA PLUS 2 TABLET(S): 8.6 TABLET ORAL at 21:28

## 2023-01-08 RX ADMIN — Medication 1000 MILLIGRAM(S): at 16:33

## 2023-01-08 RX ADMIN — Medication 250 MILLIGRAM(S): at 12:39

## 2023-01-08 RX ADMIN — Medication 1000 MILLIGRAM(S): at 05:41

## 2023-01-08 RX ADMIN — PRAMIPEXOLE DIHYDROCHLORIDE 0.5 MILLIGRAM(S): 0.12 TABLET ORAL at 00:13

## 2023-01-08 RX ADMIN — Medication 1000 MILLIGRAM(S): at 22:10

## 2023-01-08 RX ADMIN — CARBIDOPA AND LEVODOPA 1 TABLET(S): 25; 100 TABLET ORAL at 05:42

## 2023-01-08 RX ADMIN — APIXABAN 2.5 MILLIGRAM(S): 2.5 TABLET, FILM COATED ORAL at 05:42

## 2023-01-08 RX ADMIN — CITALOPRAM 10 MILLIGRAM(S): 10 TABLET, FILM COATED ORAL at 12:40

## 2023-01-08 RX ADMIN — PRAMIPEXOLE DIHYDROCHLORIDE 0.5 MILLIGRAM(S): 0.12 TABLET ORAL at 05:41

## 2023-01-08 RX ADMIN — OXYCODONE HYDROCHLORIDE 5 MILLIGRAM(S): 5 TABLET ORAL at 11:10

## 2023-01-08 RX ADMIN — PRAMIPEXOLE DIHYDROCHLORIDE 0.5 MILLIGRAM(S): 0.12 TABLET ORAL at 23:49

## 2023-01-08 RX ADMIN — Medication 250 MILLIGRAM(S): at 05:42

## 2023-01-08 RX ADMIN — Medication 250 MILLIGRAM(S): at 23:49

## 2023-01-08 RX ADMIN — CELECOXIB 200 MILLIGRAM(S): 200 CAPSULE ORAL at 22:11

## 2023-01-08 RX ADMIN — APIXABAN 2.5 MILLIGRAM(S): 2.5 TABLET, FILM COATED ORAL at 17:38

## 2023-01-08 RX ADMIN — Medication 125 MICROGRAM(S): at 05:41

## 2023-01-08 RX ADMIN — CARBIDOPA AND LEVODOPA 1 TABLET(S): 25; 100 TABLET ORAL at 17:38

## 2023-01-08 RX ADMIN — Medication 1000 MILLIGRAM(S): at 17:04

## 2023-01-08 RX ADMIN — PANTOPRAZOLE SODIUM 40 MILLIGRAM(S): 20 TABLET, DELAYED RELEASE ORAL at 05:41

## 2023-01-08 RX ADMIN — CARBIDOPA AND LEVODOPA 1 TABLET(S): 25; 100 TABLET ORAL at 23:49

## 2023-01-08 RX ADMIN — Medication 1000 MILLIGRAM(S): at 21:27

## 2023-01-08 RX ADMIN — Medication 0.5 MILLIGRAM(S): at 17:35

## 2023-01-08 RX ADMIN — CITALOPRAM 10 MILLIGRAM(S): 10 TABLET, FILM COATED ORAL at 12:29

## 2023-01-08 RX ADMIN — Medication 1000 MILLIGRAM(S): at 06:00

## 2023-01-08 NOTE — DIETITIAN INITIAL EVALUATION ADULT - PERTINENT MEDS FT
MEDICATIONS  (STANDING):  acetaminophen     Tablet .. 1000 milliGRAM(s) Oral every 8 hours  apixaban 2.5 milliGRAM(s) Oral every 12 hours  brimonidine 0.2% Ophthalmic Solution 1 Drop(s) Both EYES two times a day  carbidopa/levodopa  25/100 1 Tablet(s) Oral four times a day  carbidopa/levodopa  25/100 1 Tablet(s) Oral four times a day  celecoxib 200 milliGRAM(s) Oral every 12 hours  cephalexin 250 milliGRAM(s) Oral every 6 hours  citalopram 10 milliGRAM(s) Oral daily  citalopram 10 milliGRAM(s) Oral daily  lactated ringers. 1000 milliLiter(s) (75 mL/Hr) IV Continuous <Continuous>  lactated ringers. 1000 milliLiter(s) (75 mL/Hr) IV Continuous <Continuous>  lactated ringers. 1000 milliLiter(s) (125 mL/Hr) IV Continuous <Continuous>  levothyroxine 125 MICROGram(s) Oral daily  ondansetron Injectable 4 milliGRAM(s) IV Push once  pantoprazole    Tablet 40 milliGRAM(s) Oral before breakfast  polyethylene glycol 3350 17 Gram(s) Oral at bedtime  pramipexole 0.5 milliGRAM(s) Oral four times a day  senna 2 Tablet(s) Oral at bedtime    MEDICATIONS  (PRN):  ALPRAZolam 0.5 milliGRAM(s) Oral three times a day PRN Anxiety Agitation  aluminum hydroxide/magnesium hydroxide/simethicone Suspension 30 milliLiter(s) Oral four times a day PRN Indigestion  bisacodyl Suppository 10 milliGRAM(s) Rectal once PRN Constipation  diphenhydrAMINE 25 milliGRAM(s) Oral every 6 hours PRN Rash and/or Itching  HYDROmorphone  Injectable 0.5 milliGRAM(s) IV Push every 3 hours PRN for breakthrough pain  magnesium hydroxide Suspension 30 milliLiter(s) Oral daily PRN Constipation  ondansetron Injectable 4 milliGRAM(s) IV Push every 6 hours PRN Nausea and/or Vomiting  oxyCODONE    IR 5 milliGRAM(s) Oral every 3 hours PRN Moderate Pain (4 - 6)  oxyCODONE    IR 10 milliGRAM(s) Oral every 3 hours PRN Severe Pain (7 - 10)

## 2023-01-08 NOTE — DIETITIAN INITIAL EVALUATION ADULT - REASON FOR ADMISSION
As per H&P "The pt is a 72y.o female with h/o Parkinson's, GERD, hypothyroid morbid obesity, and urostomy was  brought in to the ED by ambulance after fall today. Patient states she was going down the stairs on her way her follow up visit to Dr. Oh's office  when her legs locked and became tucked under her as she slowly fell to the ground. She had severe right knee pain and  noticed that her surgical incision was bleeding. Patient had a revision of her right TKR on 12/14/22 by Dr. Oh. Also complains of some right ankle discomfort. Denies head trauma, chest pain, SOB, nausea or any other trauma."

## 2023-01-08 NOTE — DIETITIAN INITIAL EVALUATION ADULT - PERTINENT LABORATORY DATA
01-07    135  |  100  |  24<H>  ----------------------------<  141<H>  5.3   |  26  |  1.14    Ca    8.3<L>      07 Jan 2023 07:23    A1C with Estimated Average Glucose Result: 5.5 % (12-12-22 @ 08:25)

## 2023-01-08 NOTE — PROGRESS NOTE ADULT - SUBJECTIVE AND OBJECTIVE BOX
ORTHOPEDIC PA PROGRESS NOTE  HOMA PAULSON      72y Female                                                                                                                               POD #        STATUS POST:               Pre-Op Dx: Quadriceps tendon rupture, right, sequela      Post-Op Dx:  Quadriceps tendon rupture, right, sequela      Procedure: Reconstruction of quadriceps tendon                                                    Current Pain Management:   Po Analgesics andIM /IV Anagesics     T(F): 98.5  HR: 58  BP: 137/69  RR: 16  SpO2: 97%                        8.8    8.24  )-----------( 238      ( 07 Jan 2023 07:23 )             28.3                     01-07    135  |  100  |  24<H>  ----------------------------<  141<H>  5.3   |  26  |  1.14    Ca    8.3<L>      07 Jan 2023 07:23      Physical Exam :    -   Dressing  C/D/I.   - hemovac removed today  -   Distal Neurvascular status intact grossly.   -   Warm well perfused; capillary refill <3 seconds   -   (+)EHL/FHL 5/5  -   (+) Sensation to light touch  -   (-) Calf tenderness Bilaterally    A/P: 72y Female s/p Quadriceps tendon rupture, right, sequela      -   Ortho Stable  -   Pain control   -   Medicine to follow  -   DVT ppx:      Eliquis      -   Weight bearing status:  WBAT    -  Dispo:    discharge planning possibly for tomorrow

## 2023-01-08 NOTE — PROGRESS NOTE ADULT - SUBJECTIVE AND OBJECTIVE BOX
Subjective: Attempted to work with PT today but was extremely nervous about falling.      MEDICATIONS  (STANDING):  acetaminophen     Tablet .. 1000 milliGRAM(s) Oral every 8 hours  apixaban 2.5 milliGRAM(s) Oral every 12 hours  brimonidine 0.2% Ophthalmic Solution 1 Drop(s) Both EYES two times a day  carbidopa/levodopa  25/100 1 Tablet(s) Oral four times a day  carbidopa/levodopa  25/100 1 Tablet(s) Oral four times a day  celecoxib 200 milliGRAM(s) Oral every 12 hours  cephalexin 250 milliGRAM(s) Oral every 6 hours  citalopram 10 milliGRAM(s) Oral daily  citalopram 10 milliGRAM(s) Oral daily  lactated ringers. 1000 milliLiter(s) (125 mL/Hr) IV Continuous <Continuous>  lactated ringers. 1000 milliLiter(s) (75 mL/Hr) IV Continuous <Continuous>  lactated ringers. 1000 milliLiter(s) (75 mL/Hr) IV Continuous <Continuous>  levothyroxine 125 MICROGram(s) Oral daily  ondansetron Injectable 4 milliGRAM(s) IV Push once  pantoprazole    Tablet 40 milliGRAM(s) Oral before breakfast  polyethylene glycol 3350 17 Gram(s) Oral at bedtime  pramipexole 0.5 milliGRAM(s) Oral four times a day  senna 2 Tablet(s) Oral at bedtime    MEDICATIONS  (PRN):  ALPRAZolam 0.25 milliGRAM(s) Oral every 12 hours PRN anxiety  aluminum hydroxide/magnesium hydroxide/simethicone Suspension 30 milliLiter(s) Oral four times a day PRN Indigestion  bisacodyl Suppository 10 milliGRAM(s) Rectal once PRN Constipation  diphenhydrAMINE 25 milliGRAM(s) Oral every 6 hours PRN Rash and/or Itching  HYDROmorphone  Injectable 0.5 milliGRAM(s) IV Push every 3 hours PRN for breakthrough pain  magnesium hydroxide Suspension 30 milliLiter(s) Oral daily PRN Constipation  ondansetron Injectable 4 milliGRAM(s) IV Push every 6 hours PRN Nausea and/or Vomiting  oxyCODONE    IR 5 milliGRAM(s) Oral every 3 hours PRN Moderate Pain (4 - 6)  oxyCODONE    IR 10 milliGRAM(s) Oral every 3 hours PRN Severe Pain (7 - 10)      Allergies    [This allergen will not trigger allergy alert] rosy dressing (Rash)  No Known Drug Allergies    Intolerances        Vital Signs Last 24 Hrs  T(C): 36.9 (08 Jan 2023 08:05), Max: 37.7 (07 Jan 2023 15:30)  T(F): 98.5 (08 Jan 2023 08:05), Max: 99.8 (07 Jan 2023 15:30)  HR: 58 (08 Jan 2023 08:05) (58 - 104)  BP: 137/69 (08 Jan 2023 08:05) (116/67 - 137/69)  BP(mean): --  RR: 16 (08 Jan 2023 08:05) (16 - 18)  SpO2: 97% (08 Jan 2023 08:05) (95% - 97%)    Parameters below as of 08 Jan 2023 08:05  Patient On (Oxygen Delivery Method): room air        PHYSICAL EXAM:  GENERAL: NAD, well-groomed, well-developed  HEAD:  Atraumatic, Normocephalic  ENMT: Moist mucous membranes,   NECK: Supple, No JVD, Normal thyroid  NERVOUS SYSTEM:  All 4 extremities mobile, no gross sensory deficits.   CHEST/LUNG: Clear to auscultation bilaterally; No rales, rhonchi, wheezing, or rubs  HEART: Regular rate and rhythm; No murmurs, rubs, or gallops  ABDOMEN: Soft, Nontender, Nondistended; Bowel sounds present  EXTREMITIES:  2+ Peripheral Pulses, No clubbing, cyanosis, or edema      LABS:      Ca    8.3        07 Jan 2023 07:23          CAPILLARY BLOOD GLUCOSE          RADIOLOGY & ADDITIONAL TESTS:    Imaging Personally Reviewed:  [ ] YES     Consultant(s) Notes Reviewed:      Care Discussed with Consultants/Other Providers:    Advanced Directives: [ ] DNR  [ ] No feeding tube  [ ] MOLST in chart  [ ] MOLST completed today  [ ] Unknown

## 2023-01-08 NOTE — DIETITIAN INITIAL EVALUATION ADULT - ETIOLOGY
related to increased physiological demand for nutrients  related to excessive energy intake and lack of physical activity

## 2023-01-08 NOTE — DIETITIAN INITIAL EVALUATION ADULT - OTHER INFO
Weight: Pt reports a current wt of 240 lbs, upon admission pt with a wt of 245 lbs. Pt denies any recent wt changes, will continue to monitor weights and trend as available.     Pt seen for nutrition assessment presents s/p fall, per documents pt had a revision of her right TKR on (12/14/22) now s/p quadriceps tendon rupture repair on(1/6). Pt presently on a regular diet with good tolerance denies difficulty chewing/ swallowing of foods at this time. Pt reports good appetite but consuming ~50% of meals provided in-house as she fears wt gain due to lack of mobility/physical movement. As per documents pt attempting to work with PT but nervous about falling. Verbally discussed importance of well balanced meals, proper portion sizes, and importance of adequate protein intake at meals to optimize nutrition status and aid in wound healing. Pt verbalized understating with no nutrition-related questions at this time. Made aware RD remains available as needed.

## 2023-01-08 NOTE — DIETITIAN INITIAL EVALUATION ADULT - ORAL INTAKE PTA/DIET HISTORY
Pt seen at bedside reports good appetite and PO intake PTA, consumes 3 meals/day and does not follow a therapeutic diet. Confirms NKFA and taking centrum, fish oil and vitamin D PTA.

## 2023-01-09 ENCOUNTER — TRANSCRIPTION ENCOUNTER (OUTPATIENT)
Age: 73
End: 2023-01-09

## 2023-01-09 VITALS
RESPIRATION RATE: 16 BRPM | SYSTOLIC BLOOD PRESSURE: 116 MMHG | DIASTOLIC BLOOD PRESSURE: 57 MMHG | OXYGEN SATURATION: 95 % | TEMPERATURE: 98 F | HEART RATE: 80 BPM

## 2023-01-09 LAB — SARS-COV-2 RNA SPEC QL NAA+PROBE: SIGNIFICANT CHANGE UP

## 2023-01-09 PROCEDURE — 73560 X-RAY EXAM OF KNEE 1 OR 2: CPT

## 2023-01-09 PROCEDURE — 86923 COMPATIBILITY TEST ELECTRIC: CPT

## 2023-01-09 PROCEDURE — 36430 TRANSFUSION BLD/BLD COMPNT: CPT

## 2023-01-09 PROCEDURE — 87070 CULTURE OTHR SPECIMN AEROBIC: CPT

## 2023-01-09 PROCEDURE — 71045 X-RAY EXAM CHEST 1 VIEW: CPT

## 2023-01-09 PROCEDURE — 82607 VITAMIN B-12: CPT

## 2023-01-09 PROCEDURE — 85027 COMPLETE CBC AUTOMATED: CPT

## 2023-01-09 PROCEDURE — 83550 IRON BINDING TEST: CPT

## 2023-01-09 PROCEDURE — 80048 BASIC METABOLIC PNL TOTAL CA: CPT

## 2023-01-09 PROCEDURE — 82728 ASSAY OF FERRITIN: CPT

## 2023-01-09 PROCEDURE — 73562 X-RAY EXAM OF KNEE 3: CPT

## 2023-01-09 PROCEDURE — 96375 TX/PRO/DX INJ NEW DRUG ADDON: CPT

## 2023-01-09 PROCEDURE — 88305 TISSUE EXAM BY PATHOLOGIST: CPT

## 2023-01-09 PROCEDURE — 97110 THERAPEUTIC EXERCISES: CPT

## 2023-01-09 PROCEDURE — 83540 ASSAY OF IRON: CPT

## 2023-01-09 PROCEDURE — 82746 ASSAY OF FOLIC ACID SERUM: CPT

## 2023-01-09 PROCEDURE — 96365 THER/PROPH/DIAG IV INF INIT: CPT

## 2023-01-09 PROCEDURE — C1713: CPT

## 2023-01-09 PROCEDURE — 85610 PROTHROMBIN TIME: CPT

## 2023-01-09 PROCEDURE — 80053 COMPREHEN METABOLIC PANEL: CPT

## 2023-01-09 PROCEDURE — 86901 BLOOD TYPING SEROLOGIC RH(D): CPT

## 2023-01-09 PROCEDURE — 73610 X-RAY EXAM OF ANKLE: CPT

## 2023-01-09 PROCEDURE — 87635 SARS-COV-2 COVID-19 AMP PRB: CPT

## 2023-01-09 PROCEDURE — 85730 THROMBOPLASTIN TIME PARTIAL: CPT

## 2023-01-09 PROCEDURE — 97530 THERAPEUTIC ACTIVITIES: CPT

## 2023-01-09 PROCEDURE — 97161 PT EVAL LOW COMPLEX 20 MIN: CPT

## 2023-01-09 PROCEDURE — C1776: CPT

## 2023-01-09 PROCEDURE — 86850 RBC ANTIBODY SCREEN: CPT

## 2023-01-09 PROCEDURE — 85018 HEMOGLOBIN: CPT

## 2023-01-09 PROCEDURE — P9016: CPT

## 2023-01-09 PROCEDURE — 73552 X-RAY EXAM OF FEMUR 2/>: CPT

## 2023-01-09 PROCEDURE — 86900 BLOOD TYPING SEROLOGIC ABO: CPT

## 2023-01-09 PROCEDURE — C1781: CPT

## 2023-01-09 PROCEDURE — 85025 COMPLETE CBC W/AUTO DIFF WBC: CPT

## 2023-01-09 PROCEDURE — 96376 TX/PRO/DX INJ SAME DRUG ADON: CPT

## 2023-01-09 PROCEDURE — 99285 EMERGENCY DEPT VISIT HI MDM: CPT | Mod: 25

## 2023-01-09 PROCEDURE — 88300 SURGICAL PATH GROSS: CPT

## 2023-01-09 PROCEDURE — 36415 COLL VENOUS BLD VENIPUNCTURE: CPT

## 2023-01-09 PROCEDURE — 99233 SBSQ HOSP IP/OBS HIGH 50: CPT

## 2023-01-09 PROCEDURE — 93005 ELECTROCARDIOGRAM TRACING: CPT

## 2023-01-09 PROCEDURE — 97165 OT EVAL LOW COMPLEX 30 MIN: CPT

## 2023-01-09 RX ADMIN — CELECOXIB 200 MILLIGRAM(S): 200 CAPSULE ORAL at 10:35

## 2023-01-09 RX ADMIN — APIXABAN 2.5 MILLIGRAM(S): 2.5 TABLET, FILM COATED ORAL at 05:49

## 2023-01-09 RX ADMIN — Medication 250 MILLIGRAM(S): at 11:57

## 2023-01-09 RX ADMIN — PRAMIPEXOLE DIHYDROCHLORIDE 0.5 MILLIGRAM(S): 0.12 TABLET ORAL at 11:56

## 2023-01-09 RX ADMIN — Medication 250 MILLIGRAM(S): at 05:48

## 2023-01-09 RX ADMIN — Medication 1000 MILLIGRAM(S): at 05:52

## 2023-01-09 RX ADMIN — PRAMIPEXOLE DIHYDROCHLORIDE 0.5 MILLIGRAM(S): 0.12 TABLET ORAL at 05:48

## 2023-01-09 RX ADMIN — OXYCODONE HYDROCHLORIDE 5 MILLIGRAM(S): 5 TABLET ORAL at 00:30

## 2023-01-09 RX ADMIN — Medication 1000 MILLIGRAM(S): at 13:32

## 2023-01-09 RX ADMIN — Medication 0.5 MILLIGRAM(S): at 09:35

## 2023-01-09 RX ADMIN — Medication 1000 MILLIGRAM(S): at 05:48

## 2023-01-09 RX ADMIN — CELECOXIB 200 MILLIGRAM(S): 200 CAPSULE ORAL at 09:35

## 2023-01-09 RX ADMIN — CARBIDOPA AND LEVODOPA 1 TABLET(S): 25; 100 TABLET ORAL at 05:48

## 2023-01-09 RX ADMIN — Medication 10 MILLIGRAM(S): at 13:35

## 2023-01-09 RX ADMIN — OXYCODONE HYDROCHLORIDE 5 MILLIGRAM(S): 5 TABLET ORAL at 09:54

## 2023-01-09 RX ADMIN — BRIMONIDINE TARTRATE 1 DROP(S): 2 SOLUTION/ DROPS OPHTHALMIC at 05:49

## 2023-01-09 RX ADMIN — Medication 125 MICROGRAM(S): at 05:50

## 2023-01-09 RX ADMIN — PANTOPRAZOLE SODIUM 40 MILLIGRAM(S): 20 TABLET, DELAYED RELEASE ORAL at 05:49

## 2023-01-09 RX ADMIN — CITALOPRAM 10 MILLIGRAM(S): 10 TABLET, FILM COATED ORAL at 11:57

## 2023-01-09 RX ADMIN — CARBIDOPA AND LEVODOPA 1 TABLET(S): 25; 100 TABLET ORAL at 11:57

## 2023-01-09 NOTE — PROGRESS NOTE ADULT - ASSESSMENT
72F Parkinsons, Hypothyroidism, HLD who recently underwent revision of Right Knee admitted for Fall and possible Quad injury.     Qudraceps Tendon Rupture S/P Repair 1/6/23  Continue Bowel and pain control regimen;    Incentive Spirometer for lung expansion;   Monitor Hgb and follow up electrolytes.      Macrocytic Anemia  S/P Transfusion 1U PRBC   HgB Stable    Anxiety  Lexapro   Xanax PRN    Parkinsons   Cinemet    Hypothyroidism  Synthroid     Diet  Regular    DVT Prophylaxis  Eliquis    Disposition   Discharge planning pending hospital course 
72F Parkinsons, Hypothyroidism, HLD who recently underwent revision of Right Knee admitted for Fall and possible Quad injury.     Qudraceps Tendon Rupture S/P Repair 1/6/23  Continue Bowel and pain control regimen;    Incentive Spirometer for lung expansion;   Monitor Hgb and follow up electrolytes.      Macrocytic Anemia  S/P Transfusion 1U PRBC   HgB Stable    Anxiety  Lexapro   Xanax PRN    Parkinsons   Cinemet    Hypothyroidism  Synthroid     Diet  Regular    DVT Prophylaxis  Eliquis    Disposition   Discharge planning pending hospital course 
72F Parkinsons, Hypothyroidism, HLD who recently underwent revision of Right Knee admitted for Fall and possible Quad injury.     Right Knee Injury / Wound Dehiscence   Possible Quad injury and pending MRI to determine if operative management is needed  Pain control and NWB for now   Unlikely this is infectious; Received 2 doses of IV Vancomycin and Abx discontinued    Macrocytic Anemia  Follow up on B12 and Folate  HgB is trending down and will monitor for now  Type and Screen for possible transfusion especially if operative management is required    Parkinsons   Cinemet    Hypothyroidism  Synthroid     Diet  Regular    DVT Prophylaxis  Aspirin BID    Disposition   Discharge planning pending hospital course 
72F Parkinsons, Hypothyroidism, HLD who recently underwent revision of Right Knee admitted for Fall and possible Quad injury.     Right Knee Injury / Wound Dehiscence   Quadriceps Injury as per MRI and will need operative management  Pain control and NWB for now until surgical repair     Macrocytic Anemia  Follow up on B12 and Folate  S/P Transfusion 1U PRBC     Parkinsons   Cinemet    Hypothyroidism  Synthroid     Diet  Regular    DVT Prophylaxis  Holding Aspirin in anticipation of surgery    Disposition   Discharge planning pending hospital course 
72F Parkinsons, Hypothyroidism, HLD who recently underwent revision of Right Knee admitted for Fall and possible Quad injury.     Right Knee Injury / Wound Dehiscence   Fall appears to have been sudden and not related to a trip  Possible Quad injury and pending Ortho evaluation for next steps in management   Pain control and NWB for now   Unlikely this is infectious; Received 2 doses of IV Vancomycin and will Discontinue     Macrocytic Anemia  Check B12 and Folate  HgB stable for now     Parkinsons   Cinemet    Hypothyroidism  Synthroid     Diet  Regular    DVT Prophylaxis  Aspirin BID    Disposition   Discharge planning pending hospital course 
72F Parkinsons, Hypothyroidism, HLD who recently underwent revision of Right Knee admitted for Fall and possible Quad injury.     Qudraceps Tendon Rupture S/P Repair 1/6/23  Continue Bowel and pain control regimen;    Incentive Spirometer for lung expansion;   Monitor Hgb and follow up electrolytes.      Macrocytic Anemia  S/P Transfusion 1U PRBC   HgB Stable    Anxiety  Lexapro   Xanax PRN    Parkinsons   Cinemet    Hypothyroidism  Synthroid     Diet  Regular    DVT Prophylaxis  Eliquis    Disposition   Discharge planning pending hospital course

## 2023-01-09 NOTE — DISCHARGE NOTE NURSING/CASE MANAGEMENT/SOCIAL WORK - NSDCPEFALRISK_GEN_ALL_CORE
For information on Fall & Injury Prevention, visit: [email or web address] OR  [email or web address] OR  https://www.cdc.gov/steadi/patient.html

## 2023-01-09 NOTE — PROGRESS NOTE ADULT - SUBJECTIVE AND OBJECTIVE BOX
HOMA KHURRAM                                                               [id number]                                                       Allergies---[This allergen will not trigger allergy alert] asha dressing (Rash)  No Known Drug Allergies        Pt is a 72y year old Female s/p repair right distal quads rupture.   Pt. is A&O x 3, resting comfortably, with no complaints.   Pain is 2/10.   Tolerating the diet.   Denies chest pain / shortness of breath / dyspnea / nausea / vomiting / headaches or light headed ness.         Vital Signs Last 24 Hrs  T(F): 97.9 (01-09-23 @ 07:36), Max: 97.9 (01-09-23 @ 07:36)  HR: 57 (01-09-23 @ 07:36)  BP: 122/72 (01-09-23 @ 07:36)  RR: 16 (01-09-23 @ 07:36)  SpO2: 96% (01-09-23 @ 07:36)    I&O's Detail    08 Jan 2023 07:01  -  09 Jan 2023 07:00  --------------------------------------------------------  IN:  Total IN: 0 mL    OUT:    Urostomy (mL): 2200 mL  Total OUT: 2200 mL    Total NET: -2200 mL        PE:   Right Lower Extremity:   Pt is currently in a knee immobilizer.   This was changed to the louise which is locked in full extension.   The brace was extended and the straps were checked and secured in the correct position.   She was instructed not to change the settings and to remove the brace via the clips.   Currently there is a ASHA Dressing.   It is C/D/I and secured well to the pt.   Surrounding skin is CDI with some diffuse ecchymosis noted.   No redness, heat, discharge or other evidence of infection, superficial or deep.   Neurovascularly intact.   Although the pt remained in full extension and ROM was not assessed.   The is no gross evidence of motor or sensory deficit.   +2 DP/PT pulses.   EHL/FHL/TA intact.   Toes are pink and mobile.   Negative calf tenderness.   PAS on.         A:   Pt is a 72y year old Female S/P repair right distal quads rupture, Post Op Day #3        Plan:    - Follow up with Medicine    - OOB with PT/OT, in louise brace locked in full extension (do not remove, do not bend)   - Pain control    - Incentive spirometry   - Discharge Planning when safe with PT   - DVT ppx = PAS +  apixaban 2.5 milliGRAM(s) Oral every 12 hours                                                                                                                                                                             Andrea BARRETO

## 2023-01-09 NOTE — PROGRESS NOTE ADULT - PROVIDER SPECIALTY LIST ADULT
Hospitalist
Orthopedics
Hospitalist
Orthopedics
Hospitalist

## 2023-01-09 NOTE — PROGRESS NOTE ADULT - NUTRITIONAL ASSESSMENT
This patient has been assessed with a concern for Malnutrition and has been determined to have a diagnosis/diagnoses of Morbid obesity (BMI > 40).    This patient is being managed with:   Diet Regular-  Entered: Jan 7 2023  2:32AM

## 2023-01-09 NOTE — PROGRESS NOTE ADULT - NUTRITIONAL ASSESSMENT
This patient has been assessed with a concern for Malnutrition and has been determined to have a diagnosis/diagnoses of Morbid obesity (BMI > 40).    This patient is being managed with:   Diet Regular-  Entered: Jan 7 2023  2:32AM     Stable

## 2023-01-09 NOTE — DISCHARGE NOTE NURSING/CASE MANAGEMENT/SOCIAL WORK - PATIENT PORTAL LINK FT
You can access the FollowMyHealth Patient Portal offered by [facility] by registering at the following website: [email or web address]. By joining [employer]’s FollowMyHealth portal, you will also be able to view your health information using other applications (apps) compatible with our system.

## 2023-01-09 NOTE — PROGRESS NOTE ADULT - REASON FOR ADMISSION
Fall
repair right distal quads rupture
Fall
Fall due to parkinsons--  midwound dehisence right TKA wound

## 2023-01-09 NOTE — PROGRESS NOTE ADULT - SUBJECTIVE AND OBJECTIVE BOX
Subjective: Patient seen and examined. No overnight events. Patient feels her brace is not on properly.     MEDICATIONS  (STANDING):  acetaminophen     Tablet .. 1000 milliGRAM(s) Oral every 8 hours  apixaban 2.5 milliGRAM(s) Oral every 12 hours  brimonidine 0.2% Ophthalmic Solution 1 Drop(s) Both EYES two times a day  carbidopa/levodopa  25/100 1 Tablet(s) Oral four times a day  carbidopa/levodopa  25/100 1 Tablet(s) Oral four times a day  celecoxib 200 milliGRAM(s) Oral every 12 hours  cephalexin 250 milliGRAM(s) Oral every 6 hours  citalopram 10 milliGRAM(s) Oral daily  citalopram 10 milliGRAM(s) Oral daily  lactated ringers. 1000 milliLiter(s) (125 mL/Hr) IV Continuous <Continuous>  lactated ringers. 1000 milliLiter(s) (75 mL/Hr) IV Continuous <Continuous>  lactated ringers. 1000 milliLiter(s) (75 mL/Hr) IV Continuous <Continuous>  levothyroxine 125 MICROGram(s) Oral daily  ondansetron Injectable 4 milliGRAM(s) IV Push once  pantoprazole    Tablet 40 milliGRAM(s) Oral before breakfast  polyethylene glycol 3350 17 Gram(s) Oral at bedtime  pramipexole 0.5 milliGRAM(s) Oral four times a day  senna 2 Tablet(s) Oral at bedtime    MEDICATIONS  (PRN):  ALPRAZolam 0.5 milliGRAM(s) Oral three times a day PRN Anxiety Agitation  aluminum hydroxide/magnesium hydroxide/simethicone Suspension 30 milliLiter(s) Oral four times a day PRN Indigestion  diphenhydrAMINE 25 milliGRAM(s) Oral every 6 hours PRN Rash and/or Itching  HYDROmorphone  Injectable 0.5 milliGRAM(s) IV Push every 3 hours PRN for breakthrough pain  magnesium hydroxide Suspension 30 milliLiter(s) Oral daily PRN Constipation  ondansetron Injectable 4 milliGRAM(s) IV Push every 6 hours PRN Nausea and/or Vomiting  oxyCODONE    IR 5 milliGRAM(s) Oral every 3 hours PRN Moderate Pain (4 - 6)  oxyCODONE    IR 10 milliGRAM(s) Oral every 3 hours PRN Severe Pain (7 - 10)      Allergies    [This allergen will not trigger allergy alert] rosy dressing (Rash)  No Known Drug Allergies    Intolerances        Vital Signs Last 24 Hrs  T(C): 36.6 (09 Jan 2023 07:36), Max: 36.7 (08 Jan 2023 15:41)  T(F): 97.9 (09 Jan 2023 07:36), Max: 98.1 (08 Jan 2023 15:41)  HR: 57 (09 Jan 2023 07:36) (57 - 81)  BP: 122/72 (09 Jan 2023 07:36) (100/63 - 127/72)  BP(mean): --  RR: 16 (09 Jan 2023 07:36) (16 - 17)  SpO2: 96% (09 Jan 2023 07:36) (96% - 97%)    Parameters below as of 09 Jan 2023 07:36  Patient On (Oxygen Delivery Method): room air        PHYSICAL EXAM:  GENERAL: NAD, well-groomed, well-developed  HEAD:  Atraumatic, Normocephalic  ENMT: Moist mucous membranes,   NECK: Supple, No JVD, Normal thyroid  NERVOUS SYSTEM:  All 4 extremities mobile, no gross sensory deficits.   CHEST/LUNG: Clear to auscultation bilaterally; No rales, rhonchi, wheezing, or rubs  HEART: Regular rate and rhythm; No murmurs, rubs, or gallops  ABDOMEN: Soft, Nontender, Nondistended; Bowel sounds present  EXTREMITIES:  2+ Peripheral Pulses, No clubbing, cyanosis, or edema      LABS:              CAPILLARY BLOOD GLUCOSE          RADIOLOGY & ADDITIONAL TESTS:    Imaging Personally Reviewed:  [ ] YES     Consultant(s) Notes Reviewed:      Care Discussed with Consultants/Other Providers:    Advanced Directives: [ ] DNR  [ ] No feeding tube  [ ] MOLST in chart  [ ] MOLST completed today  [ ] Unknown

## 2023-01-11 LAB
CULTURE RESULTS: SIGNIFICANT CHANGE UP
SPECIMEN SOURCE: SIGNIFICANT CHANGE UP

## 2023-01-24 ENCOUNTER — APPOINTMENT (OUTPATIENT)
Dept: ORTHOPEDIC SURGERY | Facility: CLINIC | Age: 73
End: 2023-01-24

## 2023-03-03 ENCOUNTER — APPOINTMENT (OUTPATIENT)
Dept: ORTHOPEDIC SURGERY | Facility: CLINIC | Age: 73
End: 2023-03-03
Payer: MEDICARE

## 2023-03-03 VITALS — DIASTOLIC BLOOD PRESSURE: 74 MMHG | HEART RATE: 76 BPM | SYSTOLIC BLOOD PRESSURE: 112 MMHG

## 2023-03-03 PROCEDURE — 73560 X-RAY EXAM OF KNEE 1 OR 2: CPT | Mod: RT

## 2023-03-03 PROCEDURE — 99024 POSTOP FOLLOW-UP VISIT: CPT

## 2023-04-06 NOTE — H&P ADULT - ENMT
Quality 431: Preventive Care And Screening: Unhealthy Alcohol Use - Screening: Patient not identified as an unhealthy alcohol user when screened for unhealthy alcohol use using a systematic screening method Quality 226: Preventive Care And Screening: Tobacco Use: Screening And Cessation Intervention: Patient screened for tobacco use and is an ex/non-smoker Detail Level: Simple no gross abnormalities

## 2023-04-21 ENCOUNTER — APPOINTMENT (OUTPATIENT)
Dept: ORTHOPEDIC SURGERY | Facility: CLINIC | Age: 73
End: 2023-04-21
Payer: MEDICARE

## 2023-04-21 PROCEDURE — 99213 OFFICE O/P EST LOW 20 MIN: CPT

## 2023-04-24 NOTE — HISTORY OF PRESENT ILLNESS
[de-identified] : Patient presents today for reevaluation of a recent Revision right knee arthroplasty with extensor mechanism repair.  Total knee arthroplasty December 14, 2022 with revision knee arthroplasty and extensor mechanism repair January 6, 2023.  The patient was last seen about 6 weeks ago.  Since that time she is transition to home.  Unfortunately patient is not ambulating.  She is unsure as to why she is not ambulating but she is no longer able to stand up.  She states she feels unsafe.  She is not even able to use a walker.  She does not know of any injuries.  She continues the use of the hinged [place] type knee brace.  She denies of any other related complaints.\par \par Review of Systems-\par Constitutional: No fever or chills. \par Cardiovascular: No orthopnea or chest pain\par Pulmonary: No shortness of breath. \par GI: No nausea or vomiting or abdominal pain.\par Musculoskeletal: see HPI \par Psychiatric: No anxiety and depression.

## 2023-04-24 NOTE — PHYSICAL EXAM
[de-identified] : The patient is conversive and in no apparent distress. The patient is alert and oriented to person, place, and time. Affect and mood appear normal. The head is normocephalic and atraumatic. Skin shows normal turgor with no evidence of eczema or psoriasis. No respiratory distress noted. Sensation grossly intact. MUSCULOSKELETAL:   SEE BELOW\par \par Right knee exam was performed in a wheelchair.  The patient cannot stand.  Surgical scar is well-healed.  There is a small abrasion of the anterior right knee.  Patient stated this was a result of minor trauma to the knee after bumping into an object in the wheelchair.  There is a clinical deficit of the inferior right thigh area.  The patient is not able to actively extend the leg at all.  With passive assistance, there is quadriceps fire in the mid/proximal right thigh area.  The extensor mechanism is not palpated in the area of the patella.  No active movement of the patella is appreciated with attempted knee extension.  Passively, the knee comes to full extension.  No calf tenderness.  Mild venous stasis.  No ulceration.

## 2023-04-24 NOTE — DISCUSSION/SUMMARY
[de-identified] : 25 minutes was spent reviewing the x-rays as well as discussing with the patient their clinical presentation, diagnosis and providing education.  The patient appears to have a complete failure of the revised extensor mechanism of her right knee.  The patient does not have the ability to actively extend the knee at all.  A lengthy discussion was held with the patient and her  regarding surgical options including revising the failed quadricep extensor mechanism.  The patient understands that if the surgery was to be considered, she would be placed into a long-leg cast inclusive of the foot for approximately 8 weeks.  The patient would be nonweightbearing on the right lower extremity.  The patient would be required to be a resident of an inpatient facility during that time.  The patient understands that if she was to be removed from the cast early that the chances of failure of the repeat surgery are high.  The patient understands that with each right knee surgical procedure presents risks including infection of the hardware as well as neurovascular injury and possible VTE.  The patient also understands the limitations of the surgery in terms of possible extensor mechanism lag.  The risks and benefits of the surgery were discussed with the patient at great length.  The patient right now is limited in her ability to stand because of the failed extensor mechanism and is indicated for revision surgery.  The patient and her  will contact the office to schedule surgery after discussing the options.  In the interim, the patient will continue using the knee brace.

## 2023-04-24 NOTE — REASON FOR VISIT
[Follow-Up Visit] : a follow-up visit for [FreeTextEntry2] :  Right knee revision arthroplasty with extensor mechanism repair January 6, 2023 Right TKR DOS: 12/14/22.

## 2023-04-24 NOTE — END OF VISIT
[FreeTextEntry3] : I, Dr. Soy Oh, personally performed the evaluation and management services for this established patient who presents today with a new problem/exacerbation of an existing condition.  The E/M includes conducting the examination, assessing all new/exacerbated conditions, and establishing a new plan of care.  Today, my PA Hector Romano was here to assist with my evaluation and management services of this new problem/exacerbated condition to be followed going forward.\par

## 2023-04-25 ENCOUNTER — RX RENEWAL (OUTPATIENT)
Age: 73
End: 2023-04-25

## 2023-05-31 ENCOUNTER — OUTPATIENT (OUTPATIENT)
Dept: OUTPATIENT SERVICES | Facility: HOSPITAL | Age: 73
LOS: 1 days | End: 2023-05-31
Payer: MEDICARE

## 2023-05-31 VITALS
HEIGHT: 63 IN | HEART RATE: 67 BPM | WEIGHT: 240.08 LBS | TEMPERATURE: 98 F | RESPIRATION RATE: 14 BRPM | SYSTOLIC BLOOD PRESSURE: 150 MMHG | OXYGEN SATURATION: 100 % | DIASTOLIC BLOOD PRESSURE: 84 MMHG

## 2023-05-31 DIAGNOSIS — Z98.89 OTHER SPECIFIED POSTPROCEDURAL STATES: Chronic | ICD-10-CM

## 2023-05-31 DIAGNOSIS — Z96.651 PRESENCE OF RIGHT ARTIFICIAL KNEE JOINT: Chronic | ICD-10-CM

## 2023-05-31 DIAGNOSIS — Z01.818 ENCOUNTER FOR OTHER PREPROCEDURAL EXAMINATION: ICD-10-CM

## 2023-05-31 DIAGNOSIS — D49.4 NEOPLASM OF UNSPECIFIED BEHAVIOR OF BLADDER: Chronic | ICD-10-CM

## 2023-05-31 DIAGNOSIS — Z98.890 OTHER SPECIFIED POSTPROCEDURAL STATES: Chronic | ICD-10-CM

## 2023-05-31 DIAGNOSIS — S76.119A STRAIN OF UNSPECIFIED QUADRICEPS MUSCLE, FASCIA AND TENDON, INITIAL ENCOUNTER: ICD-10-CM

## 2023-05-31 DIAGNOSIS — Z96.659 PRESENCE OF UNSPECIFIED ARTIFICIAL KNEE JOINT: Chronic | ICD-10-CM

## 2023-05-31 DIAGNOSIS — M23.90 UNSPECIFIED INTERNAL DERANGEMENT OF UNSPECIFIED KNEE: ICD-10-CM

## 2023-05-31 DIAGNOSIS — Z96.651 PRESENCE OF RIGHT ARTIFICIAL KNEE JOINT: ICD-10-CM

## 2023-05-31 DIAGNOSIS — M25.561 PAIN IN RIGHT KNEE: Chronic | ICD-10-CM

## 2023-05-31 LAB
A1C WITH ESTIMATED AVERAGE GLUCOSE RESULT: 5.6 % — SIGNIFICANT CHANGE UP (ref 4–5.6)
ALBUMIN SERPL ELPH-MCNC: 3.5 G/DL — SIGNIFICANT CHANGE UP (ref 3.3–5)
ALP SERPL-CCNC: 75 U/L — SIGNIFICANT CHANGE UP (ref 30–120)
ALT FLD-CCNC: 11 U/L DA — SIGNIFICANT CHANGE UP (ref 10–60)
ANION GAP SERPL CALC-SCNC: 10 MMOL/L — SIGNIFICANT CHANGE UP (ref 5–17)
APTT BLD: 30.6 SEC — SIGNIFICANT CHANGE UP (ref 27.5–35.5)
AST SERPL-CCNC: 17 U/L — SIGNIFICANT CHANGE UP (ref 10–40)
BILIRUB SERPL-MCNC: 0.4 MG/DL — SIGNIFICANT CHANGE UP (ref 0.2–1.2)
BLD GP AB SCN SERPL QL: SIGNIFICANT CHANGE UP
BUN SERPL-MCNC: 31 MG/DL — HIGH (ref 7–23)
CALCIUM SERPL-MCNC: 9.2 MG/DL — SIGNIFICANT CHANGE UP (ref 8.4–10.5)
CHLORIDE SERPL-SCNC: 107 MMOL/L — SIGNIFICANT CHANGE UP (ref 96–108)
CO2 SERPL-SCNC: 25 MMOL/L — SIGNIFICANT CHANGE UP (ref 22–31)
CREAT SERPL-MCNC: 0.92 MG/DL — SIGNIFICANT CHANGE UP (ref 0.5–1.3)
EGFR: 65 ML/MIN/1.73M2 — SIGNIFICANT CHANGE UP
ESTIMATED AVERAGE GLUCOSE: 114 MG/DL — SIGNIFICANT CHANGE UP (ref 68–114)
GLUCOSE SERPL-MCNC: 94 MG/DL — SIGNIFICANT CHANGE UP (ref 70–99)
HCT VFR BLD CALC: 40.2 % — SIGNIFICANT CHANGE UP (ref 34.5–45)
HGB BLD-MCNC: 12.5 G/DL — SIGNIFICANT CHANGE UP (ref 11.5–15.5)
INR BLD: 1.03 RATIO — SIGNIFICANT CHANGE UP (ref 0.88–1.16)
MCHC RBC-ENTMCNC: 31.1 GM/DL — LOW (ref 32–36)
MCHC RBC-ENTMCNC: 31.3 PG — SIGNIFICANT CHANGE UP (ref 27–34)
MCV RBC AUTO: 100.8 FL — HIGH (ref 80–100)
MRSA PCR RESULT.: DETECTED
NRBC # BLD: 0 /100 WBCS — SIGNIFICANT CHANGE UP (ref 0–0)
PLATELET # BLD AUTO: 260 K/UL — SIGNIFICANT CHANGE UP (ref 150–400)
POTASSIUM SERPL-MCNC: 4 MMOL/L — SIGNIFICANT CHANGE UP (ref 3.5–5.3)
POTASSIUM SERPL-SCNC: 4 MMOL/L — SIGNIFICANT CHANGE UP (ref 3.5–5.3)
PROT SERPL-MCNC: 7.7 G/DL — SIGNIFICANT CHANGE UP (ref 6–8.3)
PROTHROM AB SERPL-ACNC: 11.8 SEC — SIGNIFICANT CHANGE UP (ref 10.5–13.4)
RBC # BLD: 3.99 M/UL — SIGNIFICANT CHANGE UP (ref 3.8–5.2)
RBC # FLD: 15.2 % — HIGH (ref 10.3–14.5)
S AUREUS DNA NOSE QL NAA+PROBE: DETECTED
SODIUM SERPL-SCNC: 142 MMOL/L — SIGNIFICANT CHANGE UP (ref 135–145)
WBC # BLD: 7.03 K/UL — SIGNIFICANT CHANGE UP (ref 3.8–10.5)
WBC # FLD AUTO: 7.03 K/UL — SIGNIFICANT CHANGE UP (ref 3.8–10.5)

## 2023-05-31 PROCEDURE — 93010 ELECTROCARDIOGRAM REPORT: CPT

## 2023-05-31 PROCEDURE — G0463: CPT

## 2023-05-31 PROCEDURE — 93005 ELECTROCARDIOGRAM TRACING: CPT

## 2023-05-31 NOTE — H&P PST ADULT - ASSESSMENT
74 yo female is scheduled for extensor mechanism repair right knee, possible right knee replacement revision, long leg cast on 6/15/2023

## 2023-05-31 NOTE — H&P PST ADULT - GASTROINTESTINAL
Retrieved 3 more messages left by Gokul Peng stating her scripts were sent to the wrong place and for the wrong quantity  She is requesting that all her Seroquel scripts as well as her Lamictal script be sent to [facility]  She also needs the scripts sent for 90 day supplies so that she can get reimbursed for having to pay for the 30 day supplies  Will refer to Dr Shara Wilkinson for review  details… normal/soft/nontender/nondistended/normal active bowel sounds

## 2023-05-31 NOTE — H&P PST ADULT - PROBLEM SELECTOR PLAN 1
Extensor mechanism repair right knee, possible right knee replacement revision, long leg cast is planned for 6/15/2023  Diagnostic testing performed  The patient was advised to obtain medical and cardiac clearances.  Patient's  stated that he cannot transport patient to cardiac clearance and is unable to obtain; the risk factors were reviewed with patient and  and the record is requested to be reviewed by anesthesiology.  The patient was not able to be weighed or measured due to non weight bearing and the height and weight were recorded as reported on 3/2023  rehab facility.  I explained that the cardiac clearance was required related to immobility and probable weight gain from immobilization.  The pre op instructions were reviewed with the patient and  including joint replacement protocols.  best wishes offered

## 2023-05-31 NOTE — H&P PST ADULT - HISTORY OF PRESENT ILLNESS
72 yo female presents to PST with her  for scheduled extensor mechanism repair right knee, possible knee replacement revision, long leg cast on 6/15/2023 @ [facility].  She reports right total knee replacement 2016.  She developed pain in right knee 11/2022  with patella repair 12/202.  After 3 weeks, she had fall injury with subsequent right quadricep 1/3/2023.  She has been in inpatient rehab and she is unable to stand/bear weight for last 3 months related to extensor mechanism injury right knee.

## 2023-05-31 NOTE — H&P PST ADULT - NEGATIVE ENMT SYMPTOMS
no hearing difficulty/no ear pain/no sinus symptoms/no nasal congestion/no gum bleeding/no dry mouth

## 2023-05-31 NOTE — H&P PST ADULT - NSICDXPASTSURGICALHX_GEN_ALL_CORE_FT
PAST SURGICAL HISTORY:  H/O total knee replacement, right     History of urostomy     Neoplasm of bladder     Right knee pain     S/P  x 2    S/P knee replacement bilateral 1 week apart     S/P knee surgery s/p cement spacer due to bacterial infection 2017    S/P revision of total knee, right

## 2023-05-31 NOTE — H&P PST ADULT - MUSCULOSKELETAL COMMENTS
bilateral knees; non weight bearing right knee is supported by brace and patient is unable to bear weight

## 2023-05-31 NOTE — H&P PST ADULT - NSICDXPASTMEDICALHX_GEN_ALL_CORE_FT
PAST MEDICAL HISTORY:  2019 novel coronavirus disease (COVID-19)     Bladder cancer     Chronic venous insufficiency     Extensor mechanism malalignment of knee     Glaucoma     Hyperlipidemia     Hypothyroid     Morbid obesity     Morbid obesity with BMI of 40.0-44.9, adult     Osteoarthritis     Parkinsons disease diagnosed 5 years ago    Presence of right artificial knee joint     Ureterostomy status     Wheelchair dependent

## 2023-06-08 PROBLEM — H40.9 UNSPECIFIED GLAUCOMA: Chronic | Status: ACTIVE | Noted: 2023-05-31

## 2023-06-08 PROBLEM — M23.90 UNSPECIFIED INTERNAL DERANGEMENT OF UNSPECIFIED KNEE: Chronic | Status: ACTIVE | Noted: 2023-05-31

## 2023-06-08 PROBLEM — C67.9 MALIGNANT NEOPLASM OF BLADDER, UNSPECIFIED: Chronic | Status: ACTIVE | Noted: 2023-05-31

## 2023-06-08 PROBLEM — Z93.6 OTHER ARTIFICIAL OPENINGS OF URINARY TRACT STATUS: Chronic | Status: ACTIVE | Noted: 2023-05-31

## 2023-06-08 PROBLEM — Z99.3 DEPENDENCE ON WHEELCHAIR: Chronic | Status: ACTIVE | Noted: 2023-05-31

## 2023-06-08 PROBLEM — Z96.651 PRESENCE OF RIGHT ARTIFICIAL KNEE JOINT: Chronic | Status: ACTIVE | Noted: 2023-05-31

## 2023-06-15 ENCOUNTER — APPOINTMENT (OUTPATIENT)
Dept: ORTHOPEDIC SURGERY | Facility: HOSPITAL | Age: 73
End: 2023-06-15

## 2023-06-27 ENCOUNTER — APPOINTMENT (OUTPATIENT)
Dept: ORTHOPEDIC SURGERY | Facility: CLINIC | Age: 73
End: 2023-06-27

## 2023-07-20 ENCOUNTER — OUTPATIENT (OUTPATIENT)
Dept: OUTPATIENT SERVICES | Facility: HOSPITAL | Age: 73
LOS: 1 days | End: 2023-07-20
Payer: MEDICARE

## 2023-07-20 VITALS
SYSTOLIC BLOOD PRESSURE: 126 MMHG | TEMPERATURE: 97 F | RESPIRATION RATE: 16 BRPM | OXYGEN SATURATION: 98 % | WEIGHT: 240.08 LBS | DIASTOLIC BLOOD PRESSURE: 68 MMHG | HEIGHT: 63 IN | HEART RATE: 60 BPM

## 2023-07-20 DIAGNOSIS — D49.4 NEOPLASM OF UNSPECIFIED BEHAVIOR OF BLADDER: Chronic | ICD-10-CM

## 2023-07-20 DIAGNOSIS — Z98.890 OTHER SPECIFIED POSTPROCEDURAL STATES: Chronic | ICD-10-CM

## 2023-07-20 DIAGNOSIS — S76.119A STRAIN OF UNSPECIFIED QUADRICEPS MUSCLE, FASCIA AND TENDON, INITIAL ENCOUNTER: ICD-10-CM

## 2023-07-20 DIAGNOSIS — Z96.651 PRESENCE OF RIGHT ARTIFICIAL KNEE JOINT: Chronic | ICD-10-CM

## 2023-07-20 DIAGNOSIS — M25.561 PAIN IN RIGHT KNEE: Chronic | ICD-10-CM

## 2023-07-20 DIAGNOSIS — Z01.818 ENCOUNTER FOR OTHER PREPROCEDURAL EXAMINATION: ICD-10-CM

## 2023-07-20 DIAGNOSIS — S76.111A STRAIN OF RIGHT QUADRICEPS MUSCLE, FASCIA AND TENDON, INITIAL ENCOUNTER: ICD-10-CM

## 2023-07-20 DIAGNOSIS — Z96.659 PRESENCE OF UNSPECIFIED ARTIFICIAL KNEE JOINT: Chronic | ICD-10-CM

## 2023-07-20 DIAGNOSIS — Z98.89 OTHER SPECIFIED POSTPROCEDURAL STATES: Chronic | ICD-10-CM

## 2023-07-20 DIAGNOSIS — Z90.710 ACQUIRED ABSENCE OF BOTH CERVIX AND UTERUS: Chronic | ICD-10-CM

## 2023-07-20 DIAGNOSIS — Z96.651 PRESENCE OF RIGHT ARTIFICIAL KNEE JOINT: ICD-10-CM

## 2023-07-20 LAB
A1C WITH ESTIMATED AVERAGE GLUCOSE RESULT: 5.6 % — SIGNIFICANT CHANGE UP (ref 4–5.6)
ALBUMIN SERPL ELPH-MCNC: 3.8 G/DL — SIGNIFICANT CHANGE UP (ref 3.3–5)
ALP SERPL-CCNC: 80 U/L — SIGNIFICANT CHANGE UP (ref 30–120)
ALT FLD-CCNC: 12 U/L DA — SIGNIFICANT CHANGE UP (ref 10–60)
ANION GAP SERPL CALC-SCNC: 3 MMOL/L — LOW (ref 5–17)
APTT BLD: 35.6 SEC — HIGH (ref 27.5–35.5)
AST SERPL-CCNC: 16 U/L — SIGNIFICANT CHANGE UP (ref 10–40)
BILIRUB SERPL-MCNC: 0.6 MG/DL — SIGNIFICANT CHANGE UP (ref 0.2–1.2)
BLD GP AB SCN SERPL QL: SIGNIFICANT CHANGE UP
BUN SERPL-MCNC: 30 MG/DL — HIGH (ref 7–23)
CALCIUM SERPL-MCNC: 9.6 MG/DL — SIGNIFICANT CHANGE UP (ref 8.4–10.5)
CHLORIDE SERPL-SCNC: 101 MMOL/L — SIGNIFICANT CHANGE UP (ref 96–108)
CO2 SERPL-SCNC: 32 MMOL/L — HIGH (ref 22–31)
CREAT SERPL-MCNC: 0.99 MG/DL — SIGNIFICANT CHANGE UP (ref 0.5–1.3)
EGFR: 59 ML/MIN/1.73M2 — LOW
ESTIMATED AVERAGE GLUCOSE: 114 MG/DL — SIGNIFICANT CHANGE UP (ref 68–114)
GLUCOSE SERPL-MCNC: 101 MG/DL — HIGH (ref 70–99)
HCT VFR BLD CALC: 40.6 % — SIGNIFICANT CHANGE UP (ref 34.5–45)
HGB BLD-MCNC: 13.1 G/DL — SIGNIFICANT CHANGE UP (ref 11.5–15.5)
INR BLD: 1.04 RATIO — SIGNIFICANT CHANGE UP (ref 0.88–1.16)
MCHC RBC-ENTMCNC: 32.1 PG — SIGNIFICANT CHANGE UP (ref 27–34)
MCHC RBC-ENTMCNC: 32.3 GM/DL — SIGNIFICANT CHANGE UP (ref 32–36)
MCV RBC AUTO: 99.5 FL — SIGNIFICANT CHANGE UP (ref 80–100)
NRBC # BLD: 0 /100 WBCS — SIGNIFICANT CHANGE UP (ref 0–0)
PLATELET # BLD AUTO: 267 K/UL — SIGNIFICANT CHANGE UP (ref 150–400)
POTASSIUM SERPL-MCNC: 5.2 MMOL/L — SIGNIFICANT CHANGE UP (ref 3.5–5.3)
POTASSIUM SERPL-SCNC: 5.2 MMOL/L — SIGNIFICANT CHANGE UP (ref 3.5–5.3)
PROT SERPL-MCNC: 8.2 G/DL — SIGNIFICANT CHANGE UP (ref 6–8.3)
PROTHROM AB SERPL-ACNC: 12.3 SEC — SIGNIFICANT CHANGE UP (ref 10.5–13.4)
RBC # BLD: 4.08 M/UL — SIGNIFICANT CHANGE UP (ref 3.8–5.2)
RBC # FLD: 13.7 % — SIGNIFICANT CHANGE UP (ref 10.3–14.5)
SODIUM SERPL-SCNC: 136 MMOL/L — SIGNIFICANT CHANGE UP (ref 135–145)
WBC # BLD: 9.26 K/UL — SIGNIFICANT CHANGE UP (ref 3.8–10.5)
WBC # FLD AUTO: 9.26 K/UL — SIGNIFICANT CHANGE UP (ref 3.8–10.5)

## 2023-07-20 PROCEDURE — G0463: CPT

## 2023-07-20 RX ORDER — ACETAMINOPHEN 500 MG
2 TABLET ORAL
Refills: 0 | DISCHARGE

## 2023-07-20 RX ORDER — CITALOPRAM 10 MG/1
1 TABLET, FILM COATED ORAL
Qty: 0 | Refills: 0 | DISCHARGE

## 2023-07-20 RX ORDER — EVOLOCUMAB 140 MG/ML
140 INJECTION, SOLUTION SUBCUTANEOUS
Refills: 0 | DISCHARGE

## 2023-07-20 RX ORDER — EZETIMIBE 10 MG/1
1 TABLET ORAL
Refills: 0 | DISCHARGE

## 2023-07-20 RX ORDER — BRIMONIDINE TARTRATE 2 MG/MG
1 SOLUTION/ DROPS OPHTHALMIC
Qty: 0 | Refills: 0 | DISCHARGE

## 2023-07-20 RX ORDER — PRAMIPEXOLE DIHYDROCHLORIDE 0.12 MG/1
2 TABLET ORAL
Qty: 0 | Refills: 0 | DISCHARGE

## 2023-07-20 RX ORDER — METOPROLOL TARTRATE 50 MG
1 TABLET ORAL
Refills: 0 | DISCHARGE

## 2023-07-20 RX ORDER — CARBIDOPA AND LEVODOPA 25; 100 MG/1; MG/1
1 TABLET ORAL
Qty: 0 | Refills: 0 | DISCHARGE

## 2023-07-20 NOTE — H&P PST ADULT - RESPIRATORY AND THORAX
Inpatient hospitalist discharge summary                Brief Overview    PATIENT ID: Vandana Garner    MRN: [id number]     YOB: 1964    Admitting Provider: He Valle MD    Discharging Provider: Inderjit Valenzuela [address] Glenys Voss MD Orthopedic Surgery In 1 week  [address]  [phone]            [facility] joint degenerative changes in the STT joint. . Possible sclerosis. No definite foreign bodies are identified. Severe osteoarthritis of the first [facility] negative

## 2023-07-20 NOTE — H&P PST ADULT - NSICDXPASTMEDICALHX_GEN_ALL_CORE_FT
PAST MEDICAL HISTORY:  2019 novel coronavirus disease (COVID-19)     Anxiety and depression     Bladder cancer     Chronic venous insufficiency     Class 3 severe obesity with body mass index (BMI) of 40.0 to 44.9 in adult     Extensor mechanism malalignment of knee     Glaucoma     Hyperlipidemia     Hypothyroid     Osteoarthritis     Parkinsons disease diagnosed 5 years ago    Presence of right artificial knee joint     Ureterostomy status     Wheelchair dependent      PAST MEDICAL HISTORY:  2019 novel coronavirus disease (COVID-19)     Anxiety and depression     Bladder cancer     Chronic venous insufficiency     Class 3 severe obesity with body mass index (BMI) of 40.0 to 44.9 in adult     Extensor mechanism malalignment of knee     Glaucoma     Hyperlipidemia     Hypertension     Hypothyroid     Osteoarthritis     Parkinsons disease diagnosed 5 years ago    Presence of right artificial knee joint     Ureterostomy status     Wheelchair dependent

## 2023-07-20 NOTE — H&P PST ADULT - PROBLEM SELECTOR PLAN 1
Extensor Mechanism Repair RIGHT Knee on 7/26/23.   Medical and Cardiology Clearances  NPO as per Anesthesia- take Pramipexole, Carbidopa/Levodopa, Citalopram, Metoprolol and Levothyroxine w/ Gatorade  Tylenol prn for pain  Instructions reviewed and questions addressed  Pt denies any metal allergies

## 2023-07-20 NOTE — H&P PST ADULT - NSICDXPASTSURGICALHX_GEN_ALL_CORE_FT
PAST SURGICAL HISTORY:  H/O total knee replacement, right     History of urostomy     Neoplasm of bladder     Right knee pain     S/P  x 2    S/P cardiac catheterization     S/P knee replacement bilateral 1 week apart     S/P knee surgery s/p cement spacer due to bacterial infection 2017    S/P revision of total knee, right      PAST SURGICAL HISTORY:  H/O total knee replacement, right     History of urostomy     Neoplasm of bladder     Right knee pain     S/P  x 2    S/P cardiac catheterization     S/P knee replacement bilateral 1 week apart     S/P knee surgery s/p cement spacer due to bacterial infection 2017    S/P revision of total knee, right     S/P WADE-BSO

## 2023-07-21 LAB
MRSA PCR RESULT.: SIGNIFICANT CHANGE UP
S AUREUS DNA NOSE QL NAA+PROBE: SIGNIFICANT CHANGE UP

## 2023-07-23 NOTE — ED PROVIDER NOTE - TEMPLATE, MLM
CT ABDOMEN PELVIS W IV CONTRAST Additional Contrast? None   Final Result   1. No evidence of intestinal obstruction, no significant ileus. 2.  Lobular simple appearing fluid density lesion in the left pelvis now   measuring maximally 9.2 cm in length compared to 6.5 cm on the prior study. 3.  Moderate sigmoid diverticulosis without CT evidence of acute   diverticulitis. 4.  Fluid within the right colon could reflect a diarrheal process in the   proper clinical setting. No evidence of constipation. RECOMMENDATIONS:   Follow-up pelvic ultrasound in 6-12 months recommended.
Orthopedic

## 2023-07-24 NOTE — INPATIENT CERTIFICATION FOR MEDICARE PATIENTS - CURRENT MEDICAL NEEDS AND CARE PLANS
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 She was last seen by Dr. Ball on 10/14/2019  and transferred care on 10/2/2020. Fibroscan 10/2020 showed improvement, S1 steatosis and F0-1 fibrosis. however, Fibroscan 4/2022 showed F1-2 fibrosis and S2 steatosis. She had tamoxifen resumed, and was stopped only in 1/2022.\par \par # NAFLD, likely combination of metabolic risk factors and prior tamoxifen use\par - I have explained to her the natural history of nonalcoholic fatty liver disease, disease progression to FINCH, hepatic fibrosis, and cirrhosis and risk of hepatocellular carcinoma. \par - Avoid hepatotoxic agents, herbal supplements, alcohol \par - Advised to c/w statin \par - DM is controlled, currently on Metformin, mgmt. per PCP (if get uncontrolled and additional treatment needed, can consider liraglutide if no contraindications)\par - She is not on tamoxifen anymore since 1/2022 due to mastectomy, per hem/onc\par - Advised on diet, exercise as tolerated, weight loss. \par - Will repeat Fibroscan now\par - Ordered LFTs\par \par # Positive FARSHAD\par - Pos FARSHAD but normal enzymes, normal Igs, thus never had liver biopsy.\par \par # RUQ pain w. non revealing US abd and CT a/p - resolved\par - Stated that somewhat different from her "kidney stone pain", was occasionally provoked by food. \par - Patient to call office if occurs again\par - Being treated for GERD by Dr. [place] Nelson\par \par # L flank pain radiating to groin\par # Hx of kidney stones\par - Denies systemic symptoms, no dysuria, but frequency. \par - Will get UA to r/o infection\par - Patient to follow up w/  ASAP, r/o kidney stones. Task sent. \par - Ordered renal sono, but likely will need non contrast CT a/p. \par - Advised on plenty of fluid intake\par - She stated that has not required pain medication. \par - Discussed when to seek immediate medical attention (go to ER), including but not limited to fever, chills, nausea, persistent pain, hematuria etc. \par \par # HCM\par - Will need to recheck Hep B immunity with next blood work\par - Hep A immune \par - Hep C negative \par - HIV neg\par - Vitamin D was normal 6/2022 \par \par Following with PCP, , Pulmonology, GI, cardiology, hem/onc. \par \par RTC 6 months but to call office to discuss result.  Discussed when to seek immediate medical attention, including fever, chills, persistent pain, N/V, change in urine or stool color or any other new symptoms. Patient to f/u w/  ASAP.\par  Possible Home

## 2023-07-25 ENCOUNTER — TRANSCRIPTION ENCOUNTER (OUTPATIENT)
Age: 73
End: 2023-07-25

## 2023-07-25 ENCOUNTER — APPOINTMENT (OUTPATIENT)
Dept: ORTHOPEDIC SURGERY | Facility: CLINIC | Age: 73
End: 2023-07-25

## 2023-07-25 PROBLEM — F41.9 ANXIETY DISORDER, UNSPECIFIED: Chronic | Status: ACTIVE | Noted: 2023-07-20

## 2023-07-25 PROBLEM — E66.01 MORBID (SEVERE) OBESITY DUE TO EXCESS CALORIES: Chronic | Status: ACTIVE | Noted: 2023-07-20

## 2023-07-25 PROBLEM — I10 ESSENTIAL (PRIMARY) HYPERTENSION: Chronic | Status: ACTIVE | Noted: 2023-07-20

## 2023-07-26 ENCOUNTER — TRANSCRIPTION ENCOUNTER (OUTPATIENT)
Age: 73
End: 2023-07-26

## 2023-07-26 ENCOUNTER — INPATIENT (INPATIENT)
Facility: HOSPITAL | Age: 73
LOS: 2 days | Discharge: NURSING, REHAB OR LONG-TERM CARE | DRG: 501 | End: 2023-07-29
Attending: ORTHOPAEDIC SURGERY | Admitting: ORTHOPAEDIC SURGERY
Payer: MEDICARE

## 2023-07-26 ENCOUNTER — APPOINTMENT (OUTPATIENT)
Dept: ORTHOPEDIC SURGERY | Facility: HOSPITAL | Age: 73
End: 2023-07-26

## 2023-07-26 VITALS — SYSTOLIC BLOOD PRESSURE: 186 MMHG | DIASTOLIC BLOOD PRESSURE: 94 MMHG | HEART RATE: 72 BPM | RESPIRATION RATE: 15 BRPM

## 2023-07-26 DIAGNOSIS — Z96.651 PRESENCE OF RIGHT ARTIFICIAL KNEE JOINT: Chronic | ICD-10-CM

## 2023-07-26 DIAGNOSIS — Z90.710 ACQUIRED ABSENCE OF BOTH CERVIX AND UTERUS: Chronic | ICD-10-CM

## 2023-07-26 DIAGNOSIS — Z98.890 OTHER SPECIFIED POSTPROCEDURAL STATES: Chronic | ICD-10-CM

## 2023-07-26 DIAGNOSIS — M25.561 PAIN IN RIGHT KNEE: Chronic | ICD-10-CM

## 2023-07-26 DIAGNOSIS — D49.4 NEOPLASM OF UNSPECIFIED BEHAVIOR OF BLADDER: Chronic | ICD-10-CM

## 2023-07-26 DIAGNOSIS — Z96.659 PRESENCE OF UNSPECIFIED ARTIFICIAL KNEE JOINT: Chronic | ICD-10-CM

## 2023-07-26 DIAGNOSIS — Z98.89 OTHER SPECIFIED POSTPROCEDURAL STATES: Chronic | ICD-10-CM

## 2023-07-26 DIAGNOSIS — Z96.651 PRESENCE OF RIGHT ARTIFICIAL KNEE JOINT: ICD-10-CM

## 2023-07-26 DIAGNOSIS — S76.119A STRAIN OF UNSPECIFIED QUADRICEPS MUSCLE, FASCIA AND TENDON, INITIAL ENCOUNTER: ICD-10-CM

## 2023-07-26 PROCEDURE — 73560 X-RAY EXAM OF KNEE 1 OR 2: CPT | Mod: 26,RT

## 2023-07-26 PROCEDURE — 27386 REPAIR/GRAFT OF THIGH MUSCLE: CPT | Mod: RT

## 2023-07-26 DEVICE — BONE WAX 2.5GM: Type: IMPLANTABLE DEVICE | Status: FUNCTIONAL

## 2023-07-26 DEVICE — BONE FILLER BIOCOMPOSITE STIMULAN RAPID CURE 10CC: Type: IMPLANTABLE DEVICE | Status: FUNCTIONAL

## 2023-07-26 RX ORDER — ACETAMINOPHEN 500 MG
1000 TABLET ORAL ONCE
Refills: 0 | Status: COMPLETED | OUTPATIENT
Start: 2023-07-26 | End: 2023-07-26

## 2023-07-26 RX ORDER — OXYCODONE HYDROCHLORIDE 5 MG/1
10 TABLET ORAL
Refills: 0 | Status: DISCONTINUED | OUTPATIENT
Start: 2023-07-26 | End: 2023-07-29

## 2023-07-26 RX ORDER — CARBIDOPA AND LEVODOPA 25; 100 MG/1; MG/1
1 TABLET ORAL
Refills: 0 | Status: DISCONTINUED | OUTPATIENT
Start: 2023-07-26 | End: 2023-07-29

## 2023-07-26 RX ORDER — LEVOTHYROXINE SODIUM 125 MCG
125 TABLET ORAL DAILY
Refills: 0 | Status: DISCONTINUED | OUTPATIENT
Start: 2023-07-26 | End: 2023-07-29

## 2023-07-26 RX ORDER — CELECOXIB 200 MG/1
200 CAPSULE ORAL EVERY 12 HOURS
Refills: 0 | Status: DISCONTINUED | OUTPATIENT
Start: 2023-07-26 | End: 2023-07-29

## 2023-07-26 RX ORDER — APIXABAN 2.5 MG/1
2.5 TABLET, FILM COATED ORAL EVERY 12 HOURS
Refills: 0 | Status: DISCONTINUED | OUTPATIENT
Start: 2023-07-27 | End: 2023-07-29

## 2023-07-26 RX ORDER — HYDROMORPHONE HYDROCHLORIDE 2 MG/ML
0.5 INJECTION INTRAMUSCULAR; INTRAVENOUS; SUBCUTANEOUS
Refills: 0 | Status: DISCONTINUED | OUTPATIENT
Start: 2023-07-26 | End: 2023-07-29

## 2023-07-26 RX ORDER — ACETAMINOPHEN 500 MG
1000 TABLET ORAL EVERY 8 HOURS
Refills: 0 | Status: DISCONTINUED | OUTPATIENT
Start: 2023-07-27 | End: 2023-07-29

## 2023-07-26 RX ORDER — ONDANSETRON 8 MG/1
4 TABLET, FILM COATED ORAL ONCE
Refills: 0 | Status: DISCONTINUED | OUTPATIENT
Start: 2023-07-26 | End: 2023-07-27

## 2023-07-26 RX ORDER — CITALOPRAM 10 MG/1
10 TABLET, FILM COATED ORAL DAILY
Refills: 0 | Status: DISCONTINUED | OUTPATIENT
Start: 2023-07-26 | End: 2023-07-29

## 2023-07-26 RX ORDER — OXYCODONE HYDROCHLORIDE 5 MG/1
5 TABLET ORAL
Refills: 0 | Status: DISCONTINUED | OUTPATIENT
Start: 2023-07-26 | End: 2023-07-29

## 2023-07-26 RX ORDER — HYDROMORPHONE HYDROCHLORIDE 2 MG/ML
0.5 INJECTION INTRAMUSCULAR; INTRAVENOUS; SUBCUTANEOUS
Refills: 0 | Status: DISCONTINUED | OUTPATIENT
Start: 2023-07-26 | End: 2023-07-27

## 2023-07-26 RX ORDER — TRANEXAMIC ACID 100 MG/ML
1000 INJECTION, SOLUTION INTRAVENOUS ONCE
Refills: 0 | Status: COMPLETED | OUTPATIENT
Start: 2023-07-26 | End: 2023-07-26

## 2023-07-26 RX ORDER — BRIMONIDINE TARTRATE 2 MG/MG
1 SOLUTION/ DROPS OPHTHALMIC
Refills: 0 | Status: DISCONTINUED | OUTPATIENT
Start: 2023-07-26 | End: 2023-07-29

## 2023-07-26 RX ORDER — SODIUM CHLORIDE 9 MG/ML
1000 INJECTION, SOLUTION INTRAVENOUS
Refills: 0 | Status: DISCONTINUED | OUTPATIENT
Start: 2023-07-27 | End: 2023-07-29

## 2023-07-26 RX ORDER — PRAMIPEXOLE DIHYDROCHLORIDE 0.12 MG/1
0.5 TABLET ORAL
Refills: 0 | Status: DISCONTINUED | OUTPATIENT
Start: 2023-07-26 | End: 2023-07-29

## 2023-07-26 RX ORDER — SODIUM CHLORIDE 9 MG/ML
500 INJECTION INTRAMUSCULAR; INTRAVENOUS; SUBCUTANEOUS ONCE
Refills: 0 | Status: COMPLETED | OUTPATIENT
Start: 2023-07-26 | End: 2023-07-26

## 2023-07-26 RX ORDER — SODIUM CHLORIDE 9 MG/ML
1000 INJECTION, SOLUTION INTRAVENOUS
Refills: 0 | Status: DISCONTINUED | OUTPATIENT
Start: 2023-07-26 | End: 2023-07-27

## 2023-07-26 RX ORDER — APREPITANT 80 MG/1
40 CAPSULE ORAL ONCE
Refills: 0 | Status: COMPLETED | OUTPATIENT
Start: 2023-07-26 | End: 2023-07-26

## 2023-07-26 RX ORDER — CEFAZOLIN SODIUM 1 G
2000 VIAL (EA) INJECTION ONCE
Refills: 0 | Status: COMPLETED | OUTPATIENT
Start: 2023-07-26 | End: 2023-07-26

## 2023-07-26 RX ORDER — CEFAZOLIN SODIUM 1 G
2000 VIAL (EA) INJECTION EVERY 8 HOURS
Refills: 0 | Status: COMPLETED | OUTPATIENT
Start: 2023-07-26 | End: 2023-07-26

## 2023-07-26 RX ORDER — POLYETHYLENE GLYCOL 3350 17 G/17G
17 POWDER, FOR SOLUTION ORAL AT BEDTIME
Refills: 0 | Status: DISCONTINUED | OUTPATIENT
Start: 2023-07-26 | End: 2023-07-29

## 2023-07-26 RX ORDER — DEXAMETHASONE 0.5 MG/5ML
8 ELIXIR ORAL ONCE
Refills: 0 | Status: COMPLETED | OUTPATIENT
Start: 2023-07-27 | End: 2023-07-27

## 2023-07-26 RX ORDER — ONDANSETRON 8 MG/1
4 TABLET, FILM COATED ORAL EVERY 6 HOURS
Refills: 0 | Status: DISCONTINUED | OUTPATIENT
Start: 2023-07-26 | End: 2023-07-29

## 2023-07-26 RX ORDER — SENNA PLUS 8.6 MG/1
2 TABLET ORAL AT BEDTIME
Refills: 0 | Status: DISCONTINUED | OUTPATIENT
Start: 2023-07-26 | End: 2023-07-29

## 2023-07-26 RX ORDER — CHLORHEXIDINE GLUCONATE 213 G/1000ML
1 SOLUTION TOPICAL ONCE
Refills: 0 | Status: COMPLETED | OUTPATIENT
Start: 2023-07-26 | End: 2023-07-26

## 2023-07-26 RX ORDER — MAGNESIUM HYDROXIDE 400 MG/1
30 TABLET, CHEWABLE ORAL DAILY
Refills: 0 | Status: DISCONTINUED | OUTPATIENT
Start: 2023-07-26 | End: 2023-07-29

## 2023-07-26 RX ORDER — METOPROLOL TARTRATE 50 MG
25 TABLET ORAL DAILY
Refills: 0 | Status: DISCONTINUED | OUTPATIENT
Start: 2023-07-26 | End: 2023-07-29

## 2023-07-26 RX ORDER — PANTOPRAZOLE SODIUM 20 MG/1
40 TABLET, DELAYED RELEASE ORAL
Refills: 0 | Status: DISCONTINUED | OUTPATIENT
Start: 2023-07-26 | End: 2023-07-29

## 2023-07-26 RX ORDER — HYDROMORPHONE HYDROCHLORIDE 2 MG/ML
0.25 INJECTION INTRAMUSCULAR; INTRAVENOUS; SUBCUTANEOUS
Refills: 0 | Status: DISCONTINUED | OUTPATIENT
Start: 2023-07-26 | End: 2023-07-27

## 2023-07-26 RX ADMIN — HYDROMORPHONE HYDROCHLORIDE 0.5 MILLIGRAM(S): 2 INJECTION INTRAMUSCULAR; INTRAVENOUS; SUBCUTANEOUS at 21:40

## 2023-07-26 RX ADMIN — SODIUM CHLORIDE 75 MILLILITER(S): 9 INJECTION, SOLUTION INTRAVENOUS at 20:38

## 2023-07-26 RX ADMIN — HYDROMORPHONE HYDROCHLORIDE 0.5 MILLIGRAM(S): 2 INJECTION INTRAMUSCULAR; INTRAVENOUS; SUBCUTANEOUS at 20:39

## 2023-07-26 RX ADMIN — HYDROMORPHONE HYDROCHLORIDE 0.5 MILLIGRAM(S): 2 INJECTION INTRAMUSCULAR; INTRAVENOUS; SUBCUTANEOUS at 20:57

## 2023-07-26 RX ADMIN — CARBIDOPA AND LEVODOPA 1 TABLET(S): 25; 100 TABLET ORAL at 21:25

## 2023-07-26 RX ADMIN — CHLORHEXIDINE GLUCONATE 1 APPLICATION(S): 213 SOLUTION TOPICAL at 13:10

## 2023-07-26 RX ADMIN — PRAMIPEXOLE DIHYDROCHLORIDE 0.5 MILLIGRAM(S): 0.12 TABLET ORAL at 21:26

## 2023-07-26 RX ADMIN — SODIUM CHLORIDE 500 MILLILITER(S): 9 INJECTION INTRAMUSCULAR; INTRAVENOUS; SUBCUTANEOUS at 21:05

## 2023-07-26 RX ADMIN — APREPITANT 40 MILLIGRAM(S): 80 CAPSULE ORAL at 13:10

## 2023-07-26 RX ADMIN — SODIUM CHLORIDE 500 MILLILITER(S): 9 INJECTION INTRAMUSCULAR; INTRAVENOUS; SUBCUTANEOUS at 23:01

## 2023-07-26 NOTE — BRIEF OPERATIVE NOTE - NSICDXBRIEFPROCEDURE_GEN_ALL_CORE_FT
PROCEDURES:  Suture repair, muscle rupture, quadriceps, with secondary reconstruction using tendon or fascial graft 26-Jul-2023 20:15:57  Royce Garrett

## 2023-07-26 NOTE — BRIEF OPERATIVE NOTE - NSICDXBRIEFPOSTOP_GEN_ALL_CORE_FT
POST-OP DIAGNOSIS:  Tendon rupture, traumatic. quadriceps, right, subsequent encounter 26-Jul-2023 20:17:12  Royce Garrett

## 2023-07-26 NOTE — BRIEF OPERATIVE NOTE - NSICDXBRIEFPREOP_GEN_ALL_CORE_FT
PRE-OP DIAGNOSIS:  Tendon rupture, traumatic. quadriceps, right, subsequent encounter 26-Jul-2023 20:17:08  Royce Garrett

## 2023-07-27 ENCOUNTER — TRANSCRIPTION ENCOUNTER (OUTPATIENT)
Age: 73
End: 2023-07-27

## 2023-07-27 LAB
ANION GAP SERPL CALC-SCNC: 5 MMOL/L — SIGNIFICANT CHANGE UP (ref 5–17)
BUN SERPL-MCNC: 25 MG/DL — HIGH (ref 7–23)
CALCIUM SERPL-MCNC: 9.1 MG/DL — SIGNIFICANT CHANGE UP (ref 8.4–10.5)
CHLORIDE SERPL-SCNC: 104 MMOL/L — SIGNIFICANT CHANGE UP (ref 96–108)
CO2 SERPL-SCNC: 29 MMOL/L — SIGNIFICANT CHANGE UP (ref 22–31)
CREAT SERPL-MCNC: 1.05 MG/DL — SIGNIFICANT CHANGE UP (ref 0.5–1.3)
EGFR: 55 ML/MIN/1.73M2 — LOW
GLUCOSE SERPL-MCNC: 137 MG/DL — HIGH (ref 70–99)
HCT VFR BLD CALC: 33.9 % — LOW (ref 34.5–45)
HGB BLD-MCNC: 10.8 G/DL — LOW (ref 11.5–15.5)
MCHC RBC-ENTMCNC: 31.9 GM/DL — LOW (ref 32–36)
MCHC RBC-ENTMCNC: 32 PG — SIGNIFICANT CHANGE UP (ref 27–34)
MCV RBC AUTO: 100.6 FL — HIGH (ref 80–100)
NRBC # BLD: 0 /100 WBCS — SIGNIFICANT CHANGE UP (ref 0–0)
PLATELET # BLD AUTO: 219 K/UL — SIGNIFICANT CHANGE UP (ref 150–400)
POTASSIUM SERPL-MCNC: 5 MMOL/L — SIGNIFICANT CHANGE UP (ref 3.5–5.3)
POTASSIUM SERPL-SCNC: 5 MMOL/L — SIGNIFICANT CHANGE UP (ref 3.5–5.3)
RBC # BLD: 3.37 M/UL — LOW (ref 3.8–5.2)
RBC # FLD: 13.4 % — SIGNIFICANT CHANGE UP (ref 10.3–14.5)
SODIUM SERPL-SCNC: 138 MMOL/L — SIGNIFICANT CHANGE UP (ref 135–145)
WBC # BLD: 10.66 K/UL — HIGH (ref 3.8–10.5)
WBC # FLD AUTO: 10.66 K/UL — HIGH (ref 3.8–10.5)

## 2023-07-27 PROCEDURE — 99223 1ST HOSP IP/OBS HIGH 75: CPT

## 2023-07-27 RX ORDER — CELECOXIB 200 MG/1
1 CAPSULE ORAL
Qty: 56 | Refills: 0
Start: 2023-07-27 | End: 2023-08-23

## 2023-07-27 RX ORDER — POLYETHYLENE GLYCOL 3350 17 G/17G
17 POWDER, FOR SOLUTION ORAL
Qty: 0 | Refills: 0 | DISCHARGE
Start: 2023-07-27

## 2023-07-27 RX ORDER — ASPIRIN/CALCIUM CARB/MAGNESIUM 324 MG
1 TABLET ORAL
Qty: 60 | Refills: 0
Start: 2023-07-27 | End: 2023-08-25

## 2023-07-27 RX ORDER — PRAMIPEXOLE DIHYDROCHLORIDE 0.12 MG/1
0.5 TABLET ORAL ONCE
Refills: 0 | Status: COMPLETED | OUTPATIENT
Start: 2023-07-27 | End: 2023-07-27

## 2023-07-27 RX ORDER — CARBIDOPA AND LEVODOPA 25; 100 MG/1; MG/1
1 TABLET ORAL ONCE
Refills: 0 | Status: COMPLETED | OUTPATIENT
Start: 2023-07-27 | End: 2023-07-27

## 2023-07-27 RX ORDER — OXYCODONE HYDROCHLORIDE 5 MG/1
1 TABLET ORAL
Qty: 42 | Refills: 0
Start: 2023-07-27 | End: 2023-08-02

## 2023-07-27 RX ORDER — OMEPRAZOLE 10 MG/1
1 CAPSULE, DELAYED RELEASE ORAL
Qty: 28 | Refills: 0
Start: 2023-07-27 | End: 2023-08-23

## 2023-07-27 RX ORDER — APIXABAN 2.5 MG/1
1 TABLET, FILM COATED ORAL
Qty: 28 | Refills: 0
Start: 2023-07-27 | End: 2023-08-09

## 2023-07-27 RX ORDER — NALOXONE HYDROCHLORIDE 4 MG/.1ML
4 SPRAY NASAL
Qty: 1 | Refills: 0
Start: 2023-07-27

## 2023-07-27 RX ORDER — SENNA PLUS 8.6 MG/1
2 TABLET ORAL
Qty: 0 | Refills: 0 | DISCHARGE
Start: 2023-07-27

## 2023-07-27 RX ORDER — ALPRAZOLAM 0.25 MG
0.25 TABLET ORAL
Refills: 0 | Status: DISCONTINUED | OUTPATIENT
Start: 2023-07-27 | End: 2023-07-29

## 2023-07-27 RX ADMIN — CARBIDOPA AND LEVODOPA 1 TABLET(S): 25; 100 TABLET ORAL at 23:01

## 2023-07-27 RX ADMIN — Medication 1000 MILLIGRAM(S): at 17:33

## 2023-07-27 RX ADMIN — CELECOXIB 200 MILLIGRAM(S): 200 CAPSULE ORAL at 17:33

## 2023-07-27 RX ADMIN — BRIMONIDINE TARTRATE 1 DROP(S): 2 SOLUTION/ DROPS OPHTHALMIC at 05:33

## 2023-07-27 RX ADMIN — CELECOXIB 200 MILLIGRAM(S): 200 CAPSULE ORAL at 05:42

## 2023-07-27 RX ADMIN — Medication 25 MILLIGRAM(S): at 05:32

## 2023-07-27 RX ADMIN — Medication 1000 MILLIGRAM(S): at 05:42

## 2023-07-27 RX ADMIN — CARBIDOPA AND LEVODOPA 1 TABLET(S): 25; 100 TABLET ORAL at 12:00

## 2023-07-27 RX ADMIN — PRAMIPEXOLE DIHYDROCHLORIDE 0.5 MILLIGRAM(S): 0.12 TABLET ORAL at 05:33

## 2023-07-27 RX ADMIN — CARBIDOPA AND LEVODOPA 1 TABLET(S): 25; 100 TABLET ORAL at 05:32

## 2023-07-27 RX ADMIN — Medication 1000 MILLIGRAM(S): at 17:39

## 2023-07-27 RX ADMIN — Medication 1000 MILLIGRAM(S): at 01:08

## 2023-07-27 RX ADMIN — PRAMIPEXOLE DIHYDROCHLORIDE 0.5 MILLIGRAM(S): 0.12 TABLET ORAL at 23:19

## 2023-07-27 RX ADMIN — Medication 0.25 MILLIGRAM(S): at 12:00

## 2023-07-27 RX ADMIN — Medication 125 MICROGRAM(S): at 05:34

## 2023-07-27 RX ADMIN — PANTOPRAZOLE SODIUM 40 MILLIGRAM(S): 20 TABLET, DELAYED RELEASE ORAL at 05:33

## 2023-07-27 RX ADMIN — CITALOPRAM 10 MILLIGRAM(S): 10 TABLET, FILM COATED ORAL at 12:00

## 2023-07-27 RX ADMIN — Medication 400 MILLIGRAM(S): at 01:07

## 2023-07-27 RX ADMIN — BRIMONIDINE TARTRATE 1 DROP(S): 2 SOLUTION/ DROPS OPHTHALMIC at 17:34

## 2023-07-27 RX ADMIN — SODIUM CHLORIDE 100 MILLILITER(S): 9 INJECTION, SOLUTION INTRAVENOUS at 05:27

## 2023-07-27 RX ADMIN — Medication 1000 MILLIGRAM(S): at 23:19

## 2023-07-27 RX ADMIN — Medication 101.6 MILLIGRAM(S): at 05:34

## 2023-07-27 RX ADMIN — CELECOXIB 200 MILLIGRAM(S): 200 CAPSULE ORAL at 17:37

## 2023-07-27 RX ADMIN — CELECOXIB 200 MILLIGRAM(S): 200 CAPSULE ORAL at 05:35

## 2023-07-27 RX ADMIN — OXYCODONE HYDROCHLORIDE 5 MILLIGRAM(S): 5 TABLET ORAL at 03:35

## 2023-07-27 RX ADMIN — Medication 1000 MILLIGRAM(S): at 05:31

## 2023-07-27 RX ADMIN — PRAMIPEXOLE DIHYDROCHLORIDE 0.5 MILLIGRAM(S): 0.12 TABLET ORAL at 17:34

## 2023-07-27 RX ADMIN — PRAMIPEXOLE DIHYDROCHLORIDE 0.5 MILLIGRAM(S): 0.12 TABLET ORAL at 12:00

## 2023-07-27 RX ADMIN — APIXABAN 2.5 MILLIGRAM(S): 2.5 TABLET, FILM COATED ORAL at 21:08

## 2023-07-27 RX ADMIN — SENNA PLUS 2 TABLET(S): 8.6 TABLET ORAL at 21:08

## 2023-07-27 RX ADMIN — CARBIDOPA AND LEVODOPA 1 TABLET(S): 25; 100 TABLET ORAL at 17:33

## 2023-07-27 RX ADMIN — OXYCODONE HYDROCHLORIDE 5 MILLIGRAM(S): 5 TABLET ORAL at 02:45

## 2023-07-27 RX ADMIN — Medication 1000 MILLIGRAM(S): at 23:27

## 2023-07-27 RX ADMIN — APIXABAN 2.5 MILLIGRAM(S): 2.5 TABLET, FILM COATED ORAL at 12:00

## 2023-07-27 NOTE — PROGRESS NOTE ADULT - ASSESSMENT
POST OPERATIVE DAY #:  1  STATUS POST: Right quadriceps tendon repair    SUBJECTIVE: Patient seen and examined at bedside. Denies nausea, vomiting, diarrhea, chest pain ,shortness of breath, headache, dizziness, numbness, tingling. Tolerating cast well.    Pain (0-10): 3/10      OBJECTIVE:     Vital Signs Last 24 Hrs  T(C): 36.3 (27 Jul 2023 07:43), Max: 37 (26 Jul 2023 20:25)  T(F): 97.4 (27 Jul 2023 07:43), Max: 98.6 (26 Jul 2023 20:25)  HR: 60 (27 Jul 2023 07:43) (60 - 80)  BP: 153/75 (27 Jul 2023 07:43) (94/60 - 186/94)  BP(mean): --  RR: 18 (27 Jul 2023 07:43) (11 - 64)  SpO2: 99% (27 Jul 2023 07:43) (97% - 100%)    Parameters below as of 27 Jul 2023 07:43  Patient On (Oxygen Delivery Method): room air            RLE:         Long Leg Cast:  clean/dry/intact           Sensation:  intact to light touch           Calves supple and NT                                             2+ DP and PT pulses         SCDs in place                    LABS:                        10.8   10.66 )-----------( 219      ( 27 Jul 2023 06:00 )             33.9     07-27    138  |  104  |  25<H>  ----------------------------<  137<H>  5.0   |  29  |  1.05    Ca    9.1      27 Jul 2023 06:00        Urinalysis Basic - ( 27 Jul 2023 06:00 )    Color: x / Appearance: x / SG: x / pH: x  Gluc: 137 mg/dL / Ketone: x  / Bili: x / Urobili: x   Blood: x / Protein: x / Nitrite: x   Leuk Esterase: x / RBC: x / WBC x   Sq Epi: x / Non Sq Epi: x / Bacteria: x    A/P :  73y Female, stable,  s/p Right quadriceps tendon repair POD # 1  -    Pain control  -    DVT ppx: SCDs    -    Encourage Incentive Spirometry  -    Physical Therapy  -    Occupational Therapy  -    Weight bearing status: WBAT RLE  -    Discharge Plan: home today pending PT/OT/medical clearance

## 2023-07-27 NOTE — DISCHARGE NOTE PROVIDER - HOSPITAL COURSE
This patient was admitted to [facility] with a history of Right quadriceps tendon tear.  Patient underwent Pre-Surgical Testing and was medically cleared to undergo elective procedure. Patient underwent Right quadriceps tendon repair by Dr. Soy Oh. Procedure was well tolerated.  No operative or karis-operative complications arose during patient's hospital course.  Patient received antibiotic according to SCIP guidelines for infection prevention.  Eliquis was given for DVT prophylaxis, in addition to the use of SCDs.  Anesthesia, Medical Hospitalist, Physical Therapy and Occupational Therapy were consulted. Patient is stable for discharge with a good prognosis.  Appropriate discharge instructions and medications are provided in this document. This patient was admitted to [facility] with a history of Right quadriceps tendon tear.  Patient underwent Pre-Surgical Testing and was medically cleared to undergo elective procedure. Patient underwent Right quadriceps tendon repair by Dr. Soy Oh. Procedure was well tolerated.  No operative or karis-operative complications arose during patient's hospital course.  Patient received antibiotic according to SCIP guidelines for infection prevention.  Eliquis was given for DVT prophylaxis, in addition to the use of SCDs.  Anesthesia, Medical Hospitalist, Physical Therapy and Occupational Therapy were consulted. Patient is stable for discharge with a good prognosis.  Appropriate discharge instructions and medications are provided in this document. BMI 42.5 morbid obesity

## 2023-07-27 NOTE — CASE MANAGEMENT PROGRESS NOTE - NSCMPROGRESSNOTE_GEN_ALL_CORE
Case management and  met with patient and her  at bedside to discuss transition of care. Patient would prefer to go home with home care, but is open to discussing Acute rehab as recommended by TOMAS rosales. SW will send referral to acute care for review. CM discussed home care SN/PT/HHA services.  CM informed patients spouse that  he may need to private hire to supplement care at home. Patient and  stated understanding. CM will cont to be available.

## 2023-07-27 NOTE — DISCHARGE NOTE PROVIDER - NSDCCPTREATMENT_GEN_ALL_CORE_FT
PRINCIPAL PROCEDURE  Procedure: Suture repair, muscle rupture, quadriceps, with secondary reconstruction using tendon or fascial graft  Findings and Treatment:

## 2023-07-27 NOTE — PATIENT CHOICE NOTE. - NSPTCHOICESTATE_GEN_ALL_CORE

## 2023-07-27 NOTE — CONSULT NOTE ADULT - ASSESSMENT
Routing refill request to provider for review/approval because:  Was given 60mg last time, needs to know size this time  How long do you want pt to use?     Enid SMITH RN, BSN       73 female with OA knee now s/p right TKR 7/26.    1. OA knee  Right TKR  opiates prn + bowel regimen.   Physical Therapy evaluation.  DVT prophylaxis as per Orthopedic protocol.     2. Parkinson's   Sinemet    3. Restless legs  Pramipexole    4. Hypothyroid  Synthroid    5. Depression  Celexa    6. HTN  Toprol    7. DVT ppx  Eliquis    d/w

## 2023-07-27 NOTE — PHYSICAL THERAPY INITIAL EVALUATION ADULT - MD ORDER
Suture repair, muscle rupture, quadriceps, with secondary reconstruction using tendon or fascial graft

## 2023-07-27 NOTE — OCCUPATIONAL THERAPY INITIAL EVALUATION ADULT - BED MOBILITY/TRANSFERS, PREVIOUS LEVEL OF FUNCTION, OT EVAL
Lin Martino used to transfer onto bedside commode, shower bench, recliner, w/c/needed assist/needs device

## 2023-07-27 NOTE — OCCUPATIONAL THERAPY INITIAL EVALUATION ADULT - ADL RETRAINING, OT EVAL
Patient will dress upper body with set-up within 2-3 sessions.  Patient will dress lower body with max assistance, AE as needed within 2-3 sessions.

## 2023-07-27 NOTE — OCCUPATIONAL THERAPY INITIAL EVALUATION ADULT - PERTINENT HX OF CURRENT PROBLEM, REHAB EVAL
Right Suture repair, muscle rupture, quadriceps, with secondary reconstruction using tendon or fascial graft 7/26/23.   on 1/3/23 of this year, her left leg  "gave out" and she fell, hyperextending her RLE. She was told that she tore the Quadriceps muscle.  Surgery was previously scheduled for 6/15/23, but was postponed because Cardiologist wanted to do Cardiac Cath pre-op.

## 2023-07-27 NOTE — OCCUPATIONAL THERAPY INITIAL EVALUATION ADULT - ADDITIONAL COMMENTS
Pt lives with her spouse in a private house +ramp. Pt's needs are met on the main floor. Pt states she was nonambulatory PTA and was performing transfers with a Therese Stedy. Pt's bathroom has a wide door opening to accommodate the therese stedy +stall shower with shower bench. Pt reports spouse would assist with all ADL's/functional transfers. Pt owns a w/c, bedside commode, RW, Rollator

## 2023-07-27 NOTE — CASE MANAGEMENT PROGRESS NOTE - NSCMPROGRESSNOTE_GEN_ALL_CORE
Patient requires a hospital bed due to pain and limited mobility. Patient requires frequent and immediate position changes that are not feasible in a regular bed with pillows.

## 2023-07-27 NOTE — DISCHARGE NOTE PROVIDER - NSDCFUSCHEDAPPT_GEN_ALL_CORE_FT
Soy Oh  [facility]  ORTHOSURG [address] [place] [address]  Scheduled Appointment: 08/08/2023    Soy Oh  [facility]  ORTHOR[address]  Scheduled Appointment: 09/12/2023

## 2023-07-27 NOTE — DISCHARGE NOTE PROVIDER - PROVIDER TOKENS
PROVIDER:[TOKEN:[[id number]],SCHEDULEDAPPT:[08/08/2023],SCHEDULEDAPPTTIME:[01:30 PM],ESTABLISHEDPATIENT:[T]]

## 2023-07-27 NOTE — PHYSICAL THERAPY INITIAL EVALUATION ADULT - PERTINENT HX OF CURRENT PROBLEM, REHAB EVAL
72 yo female presents to PST with her  for scheduled extensor mechanism repair right knee, possible knee replacement revision, long leg cast on 6/15/2023 @ [facility].  She reports right total knee replacement 2016.  She developed pain in right knee 11/2022  with patella repair 12/202.  After 3 weeks, she had fall injury with subsequent right quadriceps 1/3/2023.  She has been in inpatient rehab and she is unable to stand/bear weight for last 3 months related to extensor mechanism injury right knee.

## 2023-07-27 NOTE — DISCHARGE NOTE PROVIDER - CARE PROVIDER_API CALL
Soy Oh  Orthopaedic Surgery  [address], [address]  [place] [address]  Phone: [phone]  Fax: [phone]  Established Patient  Scheduled Appointment: 08/08/2023 01:30 PM

## 2023-07-27 NOTE — DISCHARGE NOTE PROVIDER - NSDCACTIVITY_GEN_ALL_CORE
Sex allowed/Do not drive or operate machinery/Walking - Indoors allowed/No heavy lifting/straining/Walking - Outdoors allowed

## 2023-07-27 NOTE — PHYSICAL THERAPY INITIAL EVALUATION ADULT - ADDITIONAL COMMENTS
Lives with her  in a house with ramp/ 1 step to enter, None inside. Pt has a cane, RW , rollator, therese prado, w/c and RTS. Pt's  will be available to assist pt as needed at home. Pt was transfering from all surfaces with therese prado after d/c from rehab last time.

## 2023-07-27 NOTE — CONSULT NOTE ADULT - SUBJECTIVE AND OBJECTIVE BOX
Patient is a 73y old  Female who presents with a chief complaint of       HPI: 72yo female patient who states that on 1/3/23 of this year, her left "gave out" and she fell, hyperextending her RLE. She was told that she tore the Quadriceps muscle. She reports that it is achy, not painful, which she rates at 5/10. She is taking Tylenol 1000mg prn with some relief. Surgery is recommended and she presents today for PSTs.   s/p right TKR .  Pain tolerable.      PAST MEDICAL & SURGICAL HISTORY:  Hyperlipidemia      Parkinsons disease  diagnosed 5 years ago      Hypothyroid      Osteoarthritis      Chronic venous insufficiency       novel coronavirus disease (COVID-19)      Extensor mechanism malalignment of knee      Presence of right artificial knee joint      Wheelchair dependent      Bladder cancer      Ureterostomy status      Glaucoma      Class 3 severe obesity with body mass index (BMI) of 40.0 to 44.9 in adult      Anxiety and depression      Hypertension      S/P   x 2      S/P knee replacement  bilateral 1 week apart       S/P knee surgery  s/p cement spacer due to bacterial infection 2017      History of urostomy      H/O total knee replacement, right      Right knee pain      S/P revision of total knee, right      Neoplasm of bladder      S/P cardiac catheterization      S/P WADE-BSO          REVIEW OF SYSTEMS:    other systems currently negative unless otherwise specified in HPI.      MEDICATIONS  (STANDING):  acetaminophen     Tablet .. 1000 milliGRAM(s) Oral every 8 hours  apixaban 2.5 milliGRAM(s) Oral every 12 hours  brimonidine 0.2% Ophthalmic Solution 1 Drop(s) Both EYES two times a day  carbidopa/levodopa  25/100 1 Tablet(s) Oral four times a day  celecoxib 200 milliGRAM(s) Oral every 12 hours  citalopram 10 milliGRAM(s) Oral daily  lactated ringers. 1000 milliLiter(s) (100 mL/Hr) IV Continuous <Continuous>  levothyroxine 125 MICROGram(s) Oral daily  metoprolol succinate ER 25 milliGRAM(s) Oral daily  pantoprazole    Tablet 40 milliGRAM(s) Oral before breakfast  polyethylene glycol 3350 17 Gram(s) Oral at bedtime  pramipexole 0.5 milliGRAM(s) Oral four times a day  senna 2 Tablet(s) Oral at bedtime    MEDICATIONS  (PRN):  HYDROmorphone  Injectable 0.5 milliGRAM(s) IV Push every 3 hours PRN Breakthrough pain  magnesium hydroxide Suspension 30 milliLiter(s) Oral daily PRN Constipation  ondansetron Injectable 4 milliGRAM(s) IV Push every 6 hours PRN Nausea and/or Vomiting  oxyCODONE    IR 5 milliGRAM(s) Oral every 3 hours PRN Moderate Pain (4 - 6)  oxyCODONE    IR 10 milliGRAM(s) Oral every 3 hours PRN Severe Pain (7 - 10)      Allergies    [This allergen will not trigger allergy alert] rosy dressing (Rash)  No Known Drug Allergies    Intolerances        SOCIAL HISTORY: No toxic habits reported currently    FAMILY HISTORY:  Family history of heart disease (Father, Mother)        Vital Signs Last 24 Hrs  T(C): 36.3 (2023 07:43), Max: 37 (2023 20:25)  T(F): 97.4 (2023 07:43), Max: 98.6 (2023 20:25)  HR: 60 (2023 07:43) (60 - 80)  BP: 153/75 (2023 07:43) (94/60 - 186/94)  BP(mean): --  RR: 18 (2023 07:43) (11 - 64)  SpO2: 99% (2023 07:43) (97% - 100%)    Parameters below as of 2023 07:43  Patient On (Oxygen Delivery Method): room air        PHYSICAL EXAM:  GENERAL: No apparent distress, appears stated age  EYES: Conjunctiva and sclera clear, no discharge  ENMT: Moist mucous membranes, no nasal or ear discharge  NECK: Supple, no JVD  CHEST/LUNG: Clear to auscultation; no rales, wheeze or rubs  HEART: Regular rhythm, no rubs or gallops  ABDOMEN: Soft, Nontender, +urostomy  EXTREMITIES:  No clubbing, cyanosis or edema          LABS:  Hemoglobin: 10.8 g/dL *L* ( @ 06:00)  Platelet Count - Automated: 219 K/uL ( @ 06:00)  Hematocrit: 33.9 % *L* ( @ 06:00)  WBC Count: 10.66 K/uL *H* ( @ 06:00)  RBC Count: 3.37 M/uL *L* ( @ 06:00)          138  |  104  |  25<H>  ----------------------------<  137<H>  5.0   |  29  |  1.05    Ca    9.1      2023 06:00                      Urinalysis Basic - ( 2023 06:00 )    Color: x / Appearance: x / SG: x / pH: x  Gluc: 137 mg/dL / Ketone: x  / Bili: x / Urobili: x   Blood: x / Protein: x / Nitrite: x   Leuk Esterase: x / RBC: x / WBC x   Sq Epi: x / Non Sq Epi: x / Bacteria: x                Hemoglobin: 10.8 g/dL *L* ( @ 06:00)  Platelet Count - Automated: 219 K/uL ( @ 06:00)  Hematocrit: 33.9 % *L* ( @ 06:00)  WBC Count: 10.66 K/uL *H* ( @ 06:00)  RBC Count: 3.37 M/uL *L* ( @ 06:00)            IMAGING: imaging reviewed personally    Consultant Notes Reviewed and Care Discussed with relevant Consultants/Other Providers.

## 2023-07-27 NOTE — CARE COORDINATION ASSESSMENT. - OTHER PERTINENT DISCHARGE PLANNING INFORMATION:
met with this 73 year old female admitted from home. She had a quad muscle repair. Prior to this she was home with spouse and used a therese steady. she has a ramp to enter home. She wants to transition home and avoid rehab but understands she may need rehab. I explained social work name role availability and options for home or rehab pending OT/PT recommendations.  will follow for recommendations

## 2023-07-27 NOTE — SOCIAL WORK PROGRESS NOTE - NSSWPROGRESSNOTE_GEN_ALL_CORE
social work and  met with pateint and her spouse bedside. We reviewed options and recommendations for [facility] and patient and spouse agree with referral to [facility]. Daughter Claudia via phone as well was involved. I explained process of referral to [facility] and that if they can accept her and bed available we can transition her there. She is reluctant to go to [facility] because she was there for a few months and felt they could not provide enough therapy. she expressed that she wanted rehab and to be able to go home ASAP. Support provided and referrals made to [facility] [place] and to [facility]

## 2023-07-27 NOTE — OCCUPATIONAL THERAPY INITIAL EVALUATION ADULT - NSOTDISCHREC_GEN_A_CORE
pt with significant limitations in self care and functional mobility and would benefit from an acute rehab stay to maximize return to prior level of functioning. Pt is able to tolerate 3 hours of therapy/day/Acute Inpatient Rehab

## 2023-07-27 NOTE — DISCHARGE NOTE PROVIDER - NSDCMRMEDTOKEN_GEN_ALL_CORE_FT
brimonidine 0.2% ophthalmic solution: 1 drop(s) to each affected eye 2 times a day  carbidopa-levodopa 25 mg-100 mg oral tablet: 1 tab(s) orally 4 times a day  CeleBREX 200 mg oral capsule: 1 cap(s) orally 2 times a day  citalopram 10 mg oral tablet: 1 tab(s) orally once a day  Eliquis 2.5 mg oral tablet: 1 tab(s) orally 2 times a day  levothyroxine 125 mcg (0.125 mg) oral tablet: 1 tab(s) orally once a day  metoprolol succinate 25 mg oral tablet, extended release: 1 tab(s) orally once a day  Narcan 4 mg/0.1 mL nasal spray: 4 milligram(s) intranasally once Instill one spray into nostril as needed for drug overdose. May repeat dose in alternate nostril in 2-3 minutes while waiting for EMS  omeprazole 20 mg oral delayed release capsule: 1 cap(s) orally once a day  oxyCODONE 5 mg oral tablet: 1 tab(s) orally every 4 to 6 hours as needed for  severe pain [facility] [id number] MDD: 6 tablets  polyethylene glycol 3350 oral powder for reconstitution: 17 gram(s) orally once a day (at bedtime)  pramipexole 0.25 mg oral tablet: 2 tab(s) orally 4 times a day  Repatha 140 mg/mL subcutaneous solution: 140 dose(s) subcutaneously every 2 weeks  senna leaf extract oral tablet: 2 tab(s) orally once a day (at bedtime)  Tylenol 8 Hour 650 mg oral tablet, extended release: 2 tab(s) orally every 8 hours as needed for  moderate pain  Zetia 10 mg oral tablet: 1 tab(s) orally once a day (at bedtime)   Aspirin Enteric Coated 81 mg oral delayed release tablet: 1 tab(s) orally 2 times a day Do not start taking until completing the two week course of Eliquis. Stop taking upon removal of cast.  brimonidine 0.2% ophthalmic solution: 1 drop(s) to each affected eye 2 times a day  carbidopa-levodopa 25 mg-100 mg oral tablet: 1 tab(s) orally 4 times a day  CeleBREX 200 mg oral capsule: 1 cap(s) orally 2 times a day  citalopram 10 mg oral tablet: 1 tab(s) orally once a day  Eliquis 2.5 mg oral tablet: 1 tab(s) orally 2 times a day  levothyroxine 125 mcg (0.125 mg) oral tablet: 1 tab(s) orally once a day  metoprolol succinate 25 mg oral tablet, extended release: 1 tab(s) orally once a day  Narcan 4 mg/0.1 mL nasal spray: 4 milligram(s) intranasally once Instill one spray into nostril as needed for drug overdose. May repeat dose in alternate nostril in 2-3 minutes while waiting for EMS  omeprazole 20 mg oral delayed release capsule: 1 cap(s) orally once a day  oxyCODONE 5 mg oral tablet: 1 tab(s) orally every 4 to 6 hours as needed for  severe pain [facility] [id number] MDD: 6 tablets  polyethylene glycol 3350 oral powder for reconstitution: 17 gram(s) orally once a day (at bedtime)  pramipexole 0.25 mg oral tablet: 2 tab(s) orally 4 times a day  Repatha 140 mg/mL subcutaneous solution: 140 dose(s) subcutaneously every 2 weeks  senna leaf extract oral tablet: 2 tab(s) orally once a day (at bedtime)  Tylenol 8 Hour 650 mg oral tablet, extended release: 2 tab(s) orally every 8 hours as needed for  moderate pain  Zetia 10 mg oral tablet: 1 tab(s) orally once a day (at bedtime)

## 2023-07-27 NOTE — CARE COORDINATION ASSESSMENT. - NSDCPLANSERVICES_GEN_ALL_CORE
pending OT/PT recommendations for home or rehab/Home Care/Skilled Nursing Facility-Short Term/Subacute Rehabilitation

## 2023-07-27 NOTE — CARE COORDINATION ASSESSMENT. - HOME EQUIPMENT YN
may need hospital bed and luci for home per patient, she has rolling walker, rollator stall shower with bench and commode   ramp to enter/No
20

## 2023-07-28 LAB
ANION GAP SERPL CALC-SCNC: 6 MMOL/L — SIGNIFICANT CHANGE UP (ref 5–17)
BUN SERPL-MCNC: 29 MG/DL — HIGH (ref 7–23)
CALCIUM SERPL-MCNC: 9.9 MG/DL — SIGNIFICANT CHANGE UP (ref 8.4–10.5)
CHLORIDE SERPL-SCNC: 104 MMOL/L — SIGNIFICANT CHANGE UP (ref 96–108)
CO2 SERPL-SCNC: 28 MMOL/L — SIGNIFICANT CHANGE UP (ref 22–31)
CREAT SERPL-MCNC: 1.05 MG/DL — SIGNIFICANT CHANGE UP (ref 0.5–1.3)
EGFR: 55 ML/MIN/1.73M2 — LOW
GLUCOSE SERPL-MCNC: 90 MG/DL — SIGNIFICANT CHANGE UP (ref 70–99)
HCT VFR BLD CALC: 33.9 % — LOW (ref 34.5–45)
HGB BLD-MCNC: 10.7 G/DL — LOW (ref 11.5–15.5)
MCHC RBC-ENTMCNC: 31.6 GM/DL — LOW (ref 32–36)
MCHC RBC-ENTMCNC: 31.9 PG — SIGNIFICANT CHANGE UP (ref 27–34)
MCV RBC AUTO: 101.2 FL — HIGH (ref 80–100)
NRBC # BLD: 0 /100 WBCS — SIGNIFICANT CHANGE UP (ref 0–0)
PLATELET # BLD AUTO: 221 K/UL — SIGNIFICANT CHANGE UP (ref 150–400)
POTASSIUM SERPL-MCNC: 4.6 MMOL/L — SIGNIFICANT CHANGE UP (ref 3.5–5.3)
POTASSIUM SERPL-SCNC: 4.6 MMOL/L — SIGNIFICANT CHANGE UP (ref 3.5–5.3)
RBC # BLD: 3.35 M/UL — LOW (ref 3.8–5.2)
RBC # FLD: 13.8 % — SIGNIFICANT CHANGE UP (ref 10.3–14.5)
SODIUM SERPL-SCNC: 138 MMOL/L — SIGNIFICANT CHANGE UP (ref 135–145)
WBC # BLD: 9.81 K/UL — SIGNIFICANT CHANGE UP (ref 3.8–10.5)
WBC # FLD AUTO: 9.81 K/UL — SIGNIFICANT CHANGE UP (ref 3.8–10.5)

## 2023-07-28 PROCEDURE — 99232 SBSQ HOSP IP/OBS MODERATE 35: CPT

## 2023-07-28 RX ADMIN — Medication 1000 MILLIGRAM(S): at 16:28

## 2023-07-28 RX ADMIN — OXYCODONE HYDROCHLORIDE 5 MILLIGRAM(S): 5 TABLET ORAL at 09:04

## 2023-07-28 RX ADMIN — Medication 1000 MILLIGRAM(S): at 22:41

## 2023-07-28 RX ADMIN — OXYCODONE HYDROCHLORIDE 5 MILLIGRAM(S): 5 TABLET ORAL at 13:03

## 2023-07-28 RX ADMIN — CARBIDOPA AND LEVODOPA 1 TABLET(S): 25; 100 TABLET ORAL at 20:05

## 2023-07-28 RX ADMIN — Medication 1000 MILLIGRAM(S): at 16:18

## 2023-07-28 RX ADMIN — CARBIDOPA AND LEVODOPA 1 TABLET(S): 25; 100 TABLET ORAL at 12:17

## 2023-07-28 RX ADMIN — APIXABAN 2.5 MILLIGRAM(S): 2.5 TABLET, FILM COATED ORAL at 09:08

## 2023-07-28 RX ADMIN — OXYCODONE HYDROCHLORIDE 5 MILLIGRAM(S): 5 TABLET ORAL at 10:00

## 2023-07-28 RX ADMIN — Medication 125 MICROGRAM(S): at 05:35

## 2023-07-28 RX ADMIN — OXYCODONE HYDROCHLORIDE 5 MILLIGRAM(S): 5 TABLET ORAL at 22:35

## 2023-07-28 RX ADMIN — OXYCODONE HYDROCHLORIDE 5 MILLIGRAM(S): 5 TABLET ORAL at 04:02

## 2023-07-28 RX ADMIN — Medication 0.25 MILLIGRAM(S): at 07:53

## 2023-07-28 RX ADMIN — CARBIDOPA AND LEVODOPA 1 TABLET(S): 25; 100 TABLET ORAL at 08:00

## 2023-07-28 RX ADMIN — CELECOXIB 200 MILLIGRAM(S): 200 CAPSULE ORAL at 18:13

## 2023-07-28 RX ADMIN — SENNA PLUS 2 TABLET(S): 8.6 TABLET ORAL at 22:35

## 2023-07-28 RX ADMIN — BRIMONIDINE TARTRATE 1 DROP(S): 2 SOLUTION/ DROPS OPHTHALMIC at 06:32

## 2023-07-28 RX ADMIN — Medication 25 MILLIGRAM(S): at 06:33

## 2023-07-28 RX ADMIN — CARBIDOPA AND LEVODOPA 1 TABLET(S): 25; 100 TABLET ORAL at 16:18

## 2023-07-28 RX ADMIN — OXYCODONE HYDROCHLORIDE 5 MILLIGRAM(S): 5 TABLET ORAL at 14:05

## 2023-07-28 RX ADMIN — PRAMIPEXOLE DIHYDROCHLORIDE 0.5 MILLIGRAM(S): 0.12 TABLET ORAL at 08:00

## 2023-07-28 RX ADMIN — Medication 1000 MILLIGRAM(S): at 06:33

## 2023-07-28 RX ADMIN — Medication 1000 MILLIGRAM(S): at 06:36

## 2023-07-28 RX ADMIN — PRAMIPEXOLE DIHYDROCHLORIDE 0.5 MILLIGRAM(S): 0.12 TABLET ORAL at 12:17

## 2023-07-28 RX ADMIN — CITALOPRAM 10 MILLIGRAM(S): 10 TABLET, FILM COATED ORAL at 12:17

## 2023-07-28 RX ADMIN — CELECOXIB 200 MILLIGRAM(S): 200 CAPSULE ORAL at 18:11

## 2023-07-28 RX ADMIN — OXYCODONE HYDROCHLORIDE 5 MILLIGRAM(S): 5 TABLET ORAL at 23:17

## 2023-07-28 RX ADMIN — PRAMIPEXOLE DIHYDROCHLORIDE 0.5 MILLIGRAM(S): 0.12 TABLET ORAL at 16:18

## 2023-07-28 RX ADMIN — Medication 1000 MILLIGRAM(S): at 22:35

## 2023-07-28 RX ADMIN — CELECOXIB 200 MILLIGRAM(S): 200 CAPSULE ORAL at 06:33

## 2023-07-28 RX ADMIN — OXYCODONE HYDROCHLORIDE 5 MILLIGRAM(S): 5 TABLET ORAL at 04:40

## 2023-07-28 RX ADMIN — BRIMONIDINE TARTRATE 1 DROP(S): 2 SOLUTION/ DROPS OPHTHALMIC at 18:11

## 2023-07-28 RX ADMIN — PANTOPRAZOLE SODIUM 40 MILLIGRAM(S): 20 TABLET, DELAYED RELEASE ORAL at 06:33

## 2023-07-28 RX ADMIN — PRAMIPEXOLE DIHYDROCHLORIDE 0.5 MILLIGRAM(S): 0.12 TABLET ORAL at 20:05

## 2023-07-28 RX ADMIN — CELECOXIB 200 MILLIGRAM(S): 200 CAPSULE ORAL at 06:36

## 2023-07-28 RX ADMIN — APIXABAN 2.5 MILLIGRAM(S): 2.5 TABLET, FILM COATED ORAL at 22:35

## 2023-07-28 NOTE — PROGRESS NOTE ADULT - ASSESSMENT
73 female with OA knee now s/p right TKR 7/26.    1. OA knee  Right TKR  opiates prn + bowel regimen.   Physical Therapy evaluation.  DVT prophylaxis as per Orthopedic protocol.     2. Parkinson's   Sinemet    3. Restless legs  Pramipexole    4. Hypothyroid  Synthroid    5. Depression  Celexa    6. HTN  Toprol    7. Urostomy  outpt urology follow up    8. DVT ppx  Eliquis    d/w

## 2023-07-29 ENCOUNTER — TRANSCRIPTION ENCOUNTER (OUTPATIENT)
Age: 73
End: 2023-07-29

## 2023-07-29 VITALS
OXYGEN SATURATION: 97 % | SYSTOLIC BLOOD PRESSURE: 129 MMHG | DIASTOLIC BLOOD PRESSURE: 74 MMHG | RESPIRATION RATE: 18 BRPM | HEART RATE: 57 BPM | TEMPERATURE: 98 F

## 2023-07-29 LAB
ANION GAP SERPL CALC-SCNC: 5 MMOL/L — SIGNIFICANT CHANGE UP (ref 5–17)
BUN SERPL-MCNC: 32 MG/DL — HIGH (ref 7–23)
CALCIUM SERPL-MCNC: 9.8 MG/DL — SIGNIFICANT CHANGE UP (ref 8.4–10.5)
CHLORIDE SERPL-SCNC: 106 MMOL/L — SIGNIFICANT CHANGE UP (ref 96–108)
CO2 SERPL-SCNC: 29 MMOL/L — SIGNIFICANT CHANGE UP (ref 22–31)
CREAT SERPL-MCNC: 1.09 MG/DL — SIGNIFICANT CHANGE UP (ref 0.5–1.3)
EGFR: 53 ML/MIN/1.73M2 — LOW
GLUCOSE SERPL-MCNC: 91 MG/DL — SIGNIFICANT CHANGE UP (ref 70–99)
HCT VFR BLD CALC: 34.6 % — SIGNIFICANT CHANGE UP (ref 34.5–45)
HGB BLD-MCNC: 10.8 G/DL — LOW (ref 11.5–15.5)
MCHC RBC-ENTMCNC: 31.2 GM/DL — LOW (ref 32–36)
MCHC RBC-ENTMCNC: 31.8 PG — SIGNIFICANT CHANGE UP (ref 27–34)
MCV RBC AUTO: 101.8 FL — HIGH (ref 80–100)
NRBC # BLD: 0 /100 WBCS — SIGNIFICANT CHANGE UP (ref 0–0)
PLATELET # BLD AUTO: 194 K/UL — SIGNIFICANT CHANGE UP (ref 150–400)
POTASSIUM SERPL-MCNC: 4.6 MMOL/L — SIGNIFICANT CHANGE UP (ref 3.5–5.3)
POTASSIUM SERPL-SCNC: 4.6 MMOL/L — SIGNIFICANT CHANGE UP (ref 3.5–5.3)
RBC # BLD: 3.4 M/UL — LOW (ref 3.8–5.2)
RBC # FLD: 14.1 % — SIGNIFICANT CHANGE UP (ref 10.3–14.5)
SODIUM SERPL-SCNC: 140 MMOL/L — SIGNIFICANT CHANGE UP (ref 135–145)
WBC # BLD: 7.52 K/UL — SIGNIFICANT CHANGE UP (ref 3.8–10.5)
WBC # FLD AUTO: 7.52 K/UL — SIGNIFICANT CHANGE UP (ref 3.8–10.5)

## 2023-07-29 PROCEDURE — 85027 COMPLETE CBC AUTOMATED: CPT

## 2023-07-29 PROCEDURE — C1889: CPT

## 2023-07-29 PROCEDURE — 97161 PT EVAL LOW COMPLEX 20 MIN: CPT

## 2023-07-29 PROCEDURE — 80048 BASIC METABOLIC PNL TOTAL CA: CPT

## 2023-07-29 PROCEDURE — 99232 SBSQ HOSP IP/OBS MODERATE 35: CPT

## 2023-07-29 PROCEDURE — C1713: CPT

## 2023-07-29 PROCEDURE — 73560 X-RAY EXAM OF KNEE 1 OR 2: CPT

## 2023-07-29 PROCEDURE — 97530 THERAPEUTIC ACTIVITIES: CPT

## 2023-07-29 PROCEDURE — 97535 SELF CARE MNGMENT TRAINING: CPT

## 2023-07-29 PROCEDURE — 97116 GAIT TRAINING THERAPY: CPT

## 2023-07-29 PROCEDURE — 99261: CPT

## 2023-07-29 PROCEDURE — 97165 OT EVAL LOW COMPLEX 30 MIN: CPT

## 2023-07-29 PROCEDURE — 97110 THERAPEUTIC EXERCISES: CPT

## 2023-07-29 PROCEDURE — 36415 COLL VENOUS BLD VENIPUNCTURE: CPT

## 2023-07-29 RX ADMIN — Medication 1000 MILLIGRAM(S): at 06:46

## 2023-07-29 RX ADMIN — Medication 1000 MILLIGRAM(S): at 06:43

## 2023-07-29 RX ADMIN — APIXABAN 2.5 MILLIGRAM(S): 2.5 TABLET, FILM COATED ORAL at 09:29

## 2023-07-29 RX ADMIN — Medication 0.25 MILLIGRAM(S): at 08:25

## 2023-07-29 RX ADMIN — OXYCODONE HYDROCHLORIDE 5 MILLIGRAM(S): 5 TABLET ORAL at 12:42

## 2023-07-29 RX ADMIN — CELECOXIB 200 MILLIGRAM(S): 200 CAPSULE ORAL at 06:46

## 2023-07-29 RX ADMIN — OXYCODONE HYDROCHLORIDE 5 MILLIGRAM(S): 5 TABLET ORAL at 08:50

## 2023-07-29 RX ADMIN — CARBIDOPA AND LEVODOPA 1 TABLET(S): 25; 100 TABLET ORAL at 08:26

## 2023-07-29 RX ADMIN — PRAMIPEXOLE DIHYDROCHLORIDE 0.5 MILLIGRAM(S): 0.12 TABLET ORAL at 12:07

## 2023-07-29 RX ADMIN — CARBIDOPA AND LEVODOPA 1 TABLET(S): 25; 100 TABLET ORAL at 12:07

## 2023-07-29 RX ADMIN — CITALOPRAM 10 MILLIGRAM(S): 10 TABLET, FILM COATED ORAL at 12:11

## 2023-07-29 RX ADMIN — Medication 25 MILLIGRAM(S): at 06:43

## 2023-07-29 RX ADMIN — OXYCODONE HYDROCHLORIDE 5 MILLIGRAM(S): 5 TABLET ORAL at 08:20

## 2023-07-29 RX ADMIN — Medication 125 MICROGRAM(S): at 05:25

## 2023-07-29 RX ADMIN — PRAMIPEXOLE DIHYDROCHLORIDE 0.5 MILLIGRAM(S): 0.12 TABLET ORAL at 08:26

## 2023-07-29 RX ADMIN — BRIMONIDINE TARTRATE 1 DROP(S): 2 SOLUTION/ DROPS OPHTHALMIC at 06:44

## 2023-07-29 RX ADMIN — OXYCODONE HYDROCHLORIDE 5 MILLIGRAM(S): 5 TABLET ORAL at 12:12

## 2023-07-29 RX ADMIN — CELECOXIB 200 MILLIGRAM(S): 200 CAPSULE ORAL at 06:43

## 2023-07-29 RX ADMIN — PANTOPRAZOLE SODIUM 40 MILLIGRAM(S): 20 TABLET, DELAYED RELEASE ORAL at 06:44

## 2023-07-29 NOTE — PROGRESS NOTE ADULT - SUBJECTIVE AND OBJECTIVE BOX
ORTHOPEDIC PA PROGRESS NOTE  HOMA PAULSON      73y Female                                                                                                                               POD #    3    STATUS POST:               Pre-Op Dx: Tendon rupture, traumatic. quadriceps, right, subsequent encounter      Post-Op Dx:  Tendon rupture, traumatic. quadriceps, right, subsequent encounter      Procedure: Suture repair, muscle rupture, quadriceps, with secondary reconstruction using tendon or fascial graft                                                  Pain (0-10): 2  Current Pain Management:  [ ] PCA   [ x] Po Analgesics [ ] IM /IV Anagesics     T(F): 97.5  HR: 57  BP: 129/74  RR: 18  SpO2: 97%                        10.8   7.52  )-----------( 194      ( 29 Jul 2023 06:30 )             34.6                     07-29    140  |  106  |  32<H>  ----------------------------<  91  4.6   |  29  |  1.09    Ca    9.8      29 Jul 2023 06:30      Physical Exam :    -   LLC C/D/I.   -   Distal Neurvascular status intact grossly.   -   Warm well perfused; capillary refill <3 seconds   -   (+)EHL/FHL 5/5  -   (+) Sensation to light touch  -   (-) Calf tenderness LLE    A/P: 73y Female s/p Tendon rupture, traumatic. quadriceps, right, subsequent encounter      -   Ortho Stable  -   Pain control   -   Medicine to follow  -   DVT ppx:     [ ]SCDs     [ ] ASA     [x ] Eliquis     [ ] Lovenox  -   Weight bearing status:  WBAT [x ]        PWB    [ ]     TTWB  [ ]      NWB  [ ]   -  Dispo:     Home [ ]     Acute Rehab [ ]     [facility] [x ]     TBD [ ]
Patient is a 73y old  Female who presents with a chief complaint of s/p Right quadriceps tendon repair (27 Jul 2023 09:51)      INTERVAL History of Present Illness/OVERNIGHT EVENTS: stable    MEDICATIONS  (STANDING):  acetaminophen     Tablet .. 1000 milliGRAM(s) Oral every 8 hours  apixaban 2.5 milliGRAM(s) Oral every 12 hours  brimonidine 0.2% Ophthalmic Solution 1 Drop(s) Both EYES two times a day  carbidopa/levodopa  25/100 1 Tablet(s) Oral four times a day  celecoxib 200 milliGRAM(s) Oral every 12 hours  citalopram 10 milliGRAM(s) Oral daily  lactated ringers. 1000 milliLiter(s) (100 mL/Hr) IV Continuous <Continuous>  levothyroxine 125 MICROGram(s) Oral daily  metoprolol succinate ER 25 milliGRAM(s) Oral daily  pantoprazole    Tablet 40 milliGRAM(s) Oral before breakfast  polyethylene glycol 3350 17 Gram(s) Oral at bedtime  pramipexole 0.5 milliGRAM(s) Oral four times a day  senna 2 Tablet(s) Oral at bedtime    MEDICATIONS  (PRN):  ALPRAZolam 0.25 milliGRAM(s) Oral two times a day PRN anxiety  HYDROmorphone  Injectable 0.5 milliGRAM(s) IV Push every 3 hours PRN Breakthrough pain  magnesium hydroxide Suspension 30 milliLiter(s) Oral daily PRN Constipation  ondansetron Injectable 4 milliGRAM(s) IV Push every 6 hours PRN Nausea and/or Vomiting  oxyCODONE    IR 5 milliGRAM(s) Oral every 3 hours PRN Moderate Pain (4 - 6)  oxyCODONE    IR 10 milliGRAM(s) Oral every 3 hours PRN Severe Pain (7 - 10)      Allergies    [This allergen will not trigger allergy alert] rosy dressing (Rash)  No Known Drug Allergies    Intolerances        REVIEW OF SYSTEMS:  Other systems currently negative unless otherwise specified above.    Vital Signs Last 24 Hrs  T(C): 36.6 (28 Jul 2023 07:51), Max: 36.8 (27 Jul 2023 15:14)  T(F): 97.8 (28 Jul 2023 07:51), Max: 98.3 (27 Jul 2023 15:14)  HR: 75 (28 Jul 2023 07:51) (64 - 79)  BP: 121/51 (28 Jul 2023 07:51) (109/66 - 150/79)  BP(mean): --  RR: 16 (28 Jul 2023 07:51) (16 - 18)  SpO2: 95% (28 Jul 2023 07:51) (95% - 95%)    Parameters below as of 28 Jul 2023 07:51  Patient On (Oxygen Delivery Method): room air            PHYSICAL EXAM:  GENERAL: No apparent distress, appears stated age  EYES: Conjunctiva and sclera clear, no discharge  ENMT: Moist mucous membranes, no nasal discharge  CHEST/LUNG: Clear to auscultation bilaterally, no wheeze or rales  HEART: Regular rhythm, no rubs or gallops  ABDOMEN: Soft, +urostomy  EXTREMITIES:  RLE cast upto thigh      LABS:                        10.7   9.81  )-----------( 221      ( 28 Jul 2023 06:30 )             33.9     28 Jul 2023 06:30    138    |  104    |  29     ----------------------------<  90     4.6     |  28     |  1.05     Ca    9.9        28 Jul 2023 06:30        Urinalysis Basic - ( 28 Jul 2023 06:30 )    Color: x / Appearance: x / SG: x / pH: x  Gluc: 90 mg/dL / Ketone: x  / Bili: x / Urobili: x   Blood: x / Protein: x / Nitrite: x   Leuk Esterase: x / RBC: x / WBC x   Sq Epi: x / Non Sq Epi: x / Bacteria: x      CAPILLARY BLOOD GLUCOSE            RADIOLOGY & ADDITIONAL TESTS:      Images reviewed personally    Consultant Notes Reviewed and Care Discussed with relevant Consultants.
Discharge medication calendar:  Eliquis 2.5mg q12h x 2 weeks then ASA EC 81mg q12h until leg cast is off  Follow up with Dr. Oh  APAP 1000mg q8h x 2-3 weeks  Celecoxib 200mg q12h x 2-3 weeks  Omeprazole 20mg QAM x 4 weeks  Narcotic PRN  Docusate 100mg TID while taking narcotic  Miralax, Senna, or Bisacodyl PRN for treatment of constipation    
POST OPERATIVE DAY #:  2  STATUS POST: Right quadriceps tendon repair    SUBJECTIVE: Patient seen and examined at bedside. Denies nausea, chest pain ,shortness of breath, numbness or tingling.  Pain (0-10): 5/10      OBJECTIVE:     Vital Signs Last 24 Hrs  T(C): 36.6 (28 Jul 2023 07:51), Max: 36.8 (27 Jul 2023 15:14)  T(F): 97.8 (28 Jul 2023 07:51), Max: 98.3 (27 Jul 2023 15:14)  HR: 75 (28 Jul 2023 07:51) (64 - 79)  BP: 121/51 (28 Jul 2023 07:51) (109/66 - 150/79)  BP(mean): --  RR: 16 (28 Jul 2023 07:51) (16 - 18)  SpO2: 95% (28 Jul 2023 07:51) (95% - 95%)    Parameters below as of 28 Jul 2023 07:51  Patient On (Oxygen Delivery Method): room air    Lower extremity exam:         Right long Leg Cast:  clean/dry/intact           Sensation: intact to light touch distally bilaterally         Dorsiflexion/Plantar flexion strength 5/5         Calves supple and NT bilaterally                                         2+ DP and PT pulses         SCDs in place                    LABS:                        10.7   9.81  )-----------( 221      ( 28 Jul 2023 06:30 )             33.9     07-28    138  |  104  |  29<H>  ----------------------------<  90  4.6   |  28  |  1.05    Ca    9.9      28 Jul 2023 06:30      Urinalysis Basic - ( 28 Jul 2023 06:30 )    Color: x / Appearance: x / SG: x / pH: x  Gluc: 90 mg/dL / Ketone: x  / Bili: x / Urobili: x   Blood: x / Protein: x / Nitrite: x   Leuk Esterase: x / RBC: x / WBC x   Sq Epi: x / Non Sq Epi: x / Bacteria: x      I&O's Summary    27 Jul 2023 07:01  -  28 Jul 2023 07:00  --------------------------------------------------------  IN: 0 mL / OUT: 1600 mL / NET: -1600 mL    Urinalysis Basic - ( 28 Jul 2023 06:30 )    Color: x / Appearance: x / SG: x / pH: x  Gluc: 90 mg/dL / Ketone: x  / Bili: x / Urobili: x   Blood: x / Protein: x / Nitrite: x   Leuk Esterase: x / RBC: x / WBC x   Sq Epi: x / Non Sq Epi: x / Bacteria: x      A/P :  73y Female, stable- s/p Right quadriceps tendon repair POD # 2  -    Pain control  -    DVT ppx: SCDs and Eliquis 2.5 mg Q12h  -    Encourage Incentive Spirometry  -    Physical Therapy  -    Occupational Therapy  -    Weight bearing status: WBAT RLE  -    Discharge Plan: [facility] pending PT/OT/medical clearance    
Patient is a 73y old  Female who presents with a chief complaint of right TKR (28 Jul 2023 10:17)       INTERVAL HPI/OVERNIGHT EVENTS: Patient seen and examined at bedside. Has knee pain intermittently. Denies chest pain, palpitation, sob     MEDICATIONS  (STANDING):  acetaminophen     Tablet .. 1000 milliGRAM(s) Oral every 8 hours  apixaban 2.5 milliGRAM(s) Oral every 12 hours  brimonidine 0.2% Ophthalmic Solution 1 Drop(s) Both EYES two times a day  carbidopa/levodopa  25/100 1 Tablet(s) Oral four times a day  celecoxib 200 milliGRAM(s) Oral every 12 hours  citalopram 10 milliGRAM(s) Oral daily  lactated ringers. 1000 milliLiter(s) (100 mL/Hr) IV Continuous <Continuous>  levothyroxine 125 MICROGram(s) Oral daily  metoprolol succinate ER 25 milliGRAM(s) Oral daily  pantoprazole    Tablet 40 milliGRAM(s) Oral before breakfast  polyethylene glycol 3350 17 Gram(s) Oral at bedtime  pramipexole 0.5 milliGRAM(s) Oral four times a day  senna 2 Tablet(s) Oral at bedtime    MEDICATIONS  (PRN):  ALPRAZolam 0.25 milliGRAM(s) Oral two times a day PRN anxiety  HYDROmorphone  Injectable 0.5 milliGRAM(s) IV Push every 3 hours PRN Breakthrough pain  magnesium hydroxide Suspension 30 milliLiter(s) Oral daily PRN Constipation  ondansetron Injectable 4 milliGRAM(s) IV Push every 6 hours PRN Nausea and/or Vomiting  oxyCODONE    IR 10 milliGRAM(s) Oral every 3 hours PRN Severe Pain (7 - 10)  oxyCODONE    IR 5 milliGRAM(s) Oral every 3 hours PRN Moderate Pain (4 - 6)      Allergies    [This allergen will not trigger allergy alert] rosy dressing (Rash)  No Known Drug Allergies    Intolerances        REVIEW OF SYSTEMS:  All ROS is negative except per HPI   Vital Signs Last 24 Hrs  T(C): 36.4 (29 Jul 2023 08:19), Max: 36.7 (28 Jul 2023 15:16)  T(F): 97.5 (29 Jul 2023 08:19), Max: 98 (28 Jul 2023 15:16)  HR: 57 (29 Jul 2023 08:19) (57 - 69)  BP: 129/74 (29 Jul 2023 08:19) (103/64 - 129/74)  BP(mean): --  RR: 18 (29 Jul 2023 08:19) (16 - 18)  SpO2: 97% (29 Jul 2023 08:19) (94% - 97%)    Parameters below as of 29 Jul 2023 08:19  Patient On (Oxygen Delivery Method): room air        PHYSICAL EXAM:  GENERAL: No apparent distress, appears stated age  EYES: Conjunctiva and sclera clear, no discharge  ENMT: Moist mucous membranes, no nasal discharge  CHEST/LUNG: Clear to auscultation bilaterally, no wheeze or rales  HEART: Regular rhythm, no rubs or gallops  ABDOMEN: Soft, +urostomy  EXTREMITIES:  RLE cast upto thigh  LABS:                        10.8   7.52  )-----------( 194      ( 29 Jul 2023 06:30 )             34.6     29 Jul 2023 06:30    140    |  106    |  32     ----------------------------<  91     4.6     |  29     |  1.09     Ca    9.8        29 Jul 2023 06:30        CAPILLARY BLOOD GLUCOSE        BLOOD CULTURE    RADIOLOGY & ADDITIONAL TESTS:    Imaging Personally Reviewed:  [ ] YES     Consultant(s) Notes Reviewed:      Care Discussed with Consultants/Other Providers:

## 2023-07-29 NOTE — DISCHARGE NOTE NURSING/CASE MANAGEMENT/SOCIAL WORK - NSDCPEFALRISK_GEN_ALL_CORE
For information on Fall & Injury Prevention, visit: [email or web address] OR  [email or web address] OR  https://www.cdc.gov/steadi/patient.html

## 2023-07-29 NOTE — DISCHARGE NOTE NURSING/CASE MANAGEMENT/SOCIAL WORK - PATIENT PORTAL LINK FT
You can access the FollowMyHealth Patient Portal offered by [facility] by registering at the following website: [email or web address]. By joining [employer]’s FollowMyHealth portal, you will also be able to view your health information using other applications (apps) compatible with our system.

## 2023-07-29 NOTE — SOCIAL WORK PROGRESS NOTE - NSSWPROGRESSNOTE_GEN_ALL_CORE
BMI Counseling: Body mass index is 31 42 kg/m²  The BMI is above normal  Nutrition recommendations include consuming healthier snacks, decreasing soda and/or juice intake, moderation in carbohydrate intake, increasing intake of lean protein, reducing intake of saturated fat and trans fat and reducing intake of cholesterol  Exercise recommendations include exercising 3-5 times per week and strength training exercises  ADULT ANNUAL PHYSICAL  [address] [facility] INTERNAL MEDICINE    NAME: Jennifer Simon  AGE: 59 y o  SEX: male  : 1962     DATE: 2020     Assessment and Plan:     Problem List Items Addressed This Visit        Digestive    Gastroesophageal reflux disease     Asymptomatic for dyspepsia or heartburn but he does have hoarseness, continue with pantoprazole  He would like to see ENT regarding the hoarseness but if they do not find an issue, may suggest f/u with GI for possible egd  Cardiovascular and Mediastinum    Chronic ischemic heart disease, unspecified     No c/o chest pain  Pt has regular f/u with cardiology, due for a repeat lipid panel which is already ordered  BP well controlled  Continue with atorvastatin and aspirin  Essential hypertension     Blood pressure remains well controlled  Continue with lisinopril, hctz, metoprolol  Continue to work on [employer]  He also follows with cardiology  Renal function is reviewed, no concerns  Continue to monitor  Genitourinary    Benign prostatic hyperplasia     PSA reviewed and is normal   Previous sx of testicular pain with radiation to the lower abdomen is now resolved  He does wake 1x/night to urinate but he says this is because he drinks a large amount of water when he gets home at night since he does not have time to drink during his long shifts  Other    Acute pain of right shoulder     Sx and exam suggest rotator cuff inflammation    Pt can not Pt and Tx team were requesting chantel yesterday. SW met with the pt and her  yesterday. SNF list offered to both who declined list and requested chantel at [facility], as she was there in the past. Referral made and pt was medically accepted for admission. Pt is scheduled for dc to [facility] today at 1pm via radames. Pt, spouse, Tx team and [facility] aware.   take nsaids due to hx of bariatric surgery  Will try voltaren gel prn for relief  Avoid overuse, call if sx worsen  Relevant Medications    diclofenac sodium (VOLTAREN) 1 %    Annual physical exam - Primary     Normal exam of adult male  UTD with screening labs and monitoring  Chronic conditions are stable  He is due for repeat colon cancer screening and will get cologuard this time  Class 1 obesity due to excess calories with serious comorbidity and body mass index (BMI) of 31 0 to 31 9 in adult     Pt is losing weight and BMI has decreased  Encouraged pt to work on healthier snacks and nutrient dense meals  He gets a lot of cardiovascular exercise, can also do strength training  PEYTON (generalized anxiety disorder)     Symptoms are stable on venlafaxine  Continue with current treatment, work on stress reduction  Nerve pain    Relevant Medications    gabapentin (NEURONTIN) 300 mg capsule    Paresthesia of both hands     Again pt reports numbness intermittently in the B 1-3 fingers, worse overnight  Again advised to get splints to wear for sleep  He declines referral to ortho at this time, will call if sx worsen  Testicular pain, unspecified     Per pt this issue has resolved  He did not f/u with scheduling his us  He can call if sx recur  Weight loss, unintentional     Work up has been unremarkable  Weight loss most likely attributed to walking 8-9 per day at work and having limited time for meal breaks  He does say he is trying to gain weight by drinking soda, I reviewed healthier ways to help him stay nourished including healthy snacks  He should avoid empty calories             Other Visit Diagnoses     Screening for malignant neoplasm of colon        Relevant Orders    Cologuard    Chronic bilateral back pain, unspecified back location        Relevant Medications    cyclobenzaprine (FLEXERIL) 10 mg tablet          Immunizations and preventive care screenings were discussed with patient today  Appropriate education was printed on patient's after visit summary  Counseling:  Dental Health: discussed importance of regular tooth brushing, flossing, and dental visits  Injury prevention: discussed safety/seat belts, safety helmets, smoke detectors, carbon dioxide detectors, and smoking near bedding or upholstery  · Exercise: the importance of regular exercise/physical activity was discussed  Recommend exercise 3-5 times per week for at least 30 minutes  No follow-ups on file  Chief Complaint:     Chief Complaint   Patient presents with    Well Check      History of Present Illness:     Adult Annual Physical   Patient here for a comprehensive physical exam  The patient reports no problems  Diet and Physical Activity  · Diet/Nutrition: limited junk food, consuming 3-5 servings of fruits/vegetables daily and meals are limited due to long work hours  · Exercise: no formal exercise and walks for hours on end at work, approx 8-9 miles perday  Depression Screening  PHQ-9 Depression Screening    PHQ-9:    Frequency of the following problems over the past two weeks:       Little interest or pleasure in doing things:  0 - not at all  Feeling down, depressed, or hopeless:  0 - not at all  PHQ-2 Score:  0       General Health  · Sleep: sleeps well, gets 7-8 hours of sleep on average and unrefreshing sleep  · Hearing: normal - bilateral   · Vision: vision problems: feels his prescription needs to be updated, goes for regular eye exams, most recent eye exam <1 year ago and wears contacts  · Dental: regular dental visits and brushes teeth twice daily   Health  · Symptoms include: nocturia     Review of Systems:     Review of Systems   Constitutional: Positive for fatigue  Negative for activity change, appetite change, chills, fever and unexpected weight change  HENT: Positive for voice change (hoarseness)  Negative for hearing loss  Eyes: Positive for visual disturbance (feels he needs  new prescription)  Respiratory: Negative for cough, chest tightness and shortness of breath  Cardiovascular: Negative for chest pain, palpitations and leg swelling  Gastrointestinal: Negative for abdominal pain, constipation, diarrhea, nausea and vomiting  Genitourinary: Negative for dysuria and frequency  Musculoskeletal: Positive for arthralgias  Negative for myalgias  Allergic/Immunologic: Negative for environmental allergies  Neurological: Positive for numbness (bilateral 1-3 fingers intermittently)  Negative for dizziness, weakness and headaches  Psychiatric/Behavioral: Negative for dysphoric mood and sleep disturbance  The patient is not nervous/anxious         Past Medical History:     Past Medical History:   Diagnosis Date    Abdominal lump 4/20/2018    Anxiety     Arthritis     Cardiac disease     Ear pain, left 6/8/2018    Episode of dizziness 11/26/2018    Fever 4/22/2019    Flu-like symptoms 12/21/2018    Herpes zoster with complication 0/3/2067    Hyperlipidemia     Hypertension     Kidney stone     Myocardial infarction ([facility] [address] )     Obesity     Overweight 1/5/2018    Pain of right great toe 7/22/2019    PONV (postoperative nausea and vomiting)     in the past      Past Surgical History:     Past Surgical History:   Procedure Laterality Date    BACK SURGERY      CARDIAC SURGERY      angio with stents    CIRCUMCISION      EGD AND COLONOSCOPY      GASTRIC BYPASS      IR ABSCESS/SEROMA DRAINAGE  1/4/2019    NECK SURGERY      OTHER SURGICAL HISTORY      removal of vocal cord lesion     WI EXC TUMOR SOFT TISSUE ABDOMINAL WALL SUBQ <3CM N/A 2/27/2019    Procedure: ABDOMINAL SEROMA EXCISION;  Surgeon: Justo Cervantes MD;  [facility];  Service: General    SHOULDER SURGERY      TONSILLECTOMY      WISDOM TOOTH EXTRACTION      WOUND DEBRIDEMENT N/A 3/7/2019    Procedure: DEBRIDEMENT WOUND ABDOMINAL ([address]) AND WOUND VAC APPLICATION;  Surgeon: Stu Vaz MD;  [facility];  Service: General    WOUND DEBRIDEMENT N/A 3/9/2019    Procedure: DEBRIDEMENT WOUND ABDOMINAL ([address]), wound vac dressing change;  Surgeon: Jacques Frank DO;  [facility];  Service: General      Family History:     Family History   Problem Relation Age of Onset    Heart disease Mother         passed during open heart surgery     Heart disease Father     Diabetes Father         caused blindness     Lung cancer Sister     No Known Problems Brother     No Known Problems Son     No Known Problems Son     No Known Problems Son     No Known Problems Daughter       Social History:     E-Cigarette/Vaping    E-Cigarette Use Never User      E-Cigarette/Vaping Substances    Nicotine No     THC No     CBD No     Flavoring No     Other No     Unknown No      Social History     Socioeconomic History    Marital status: /Civil Union     Spouse name: Cory Rubalcava Number of children: 4    Years of education: None    Highest education level: None   Occupational History    None   Social Needs    Financial resource strain: None    Food insecurity     Worry: None     Inability: None    Transportation needs     Medical: None     Non-medical: None   Tobacco Use    Smoking status: Never Smoker    Smokeless tobacco: Never Used   Substance and Sexual Activity    Alcohol use: Yes     Binge frequency: Less than monthly     Comment: occasional    Drug use: No    Sexual activity: None   Lifestyle    Physical activity     Days per week: 0 days     Minutes per session: 0 min    Stress: None   Relationships    Social connections     Talks on phone: None     Gets together: None     Attends Buddhism service: None     Active member of club or organization: None     Attends meetings of clubs or organizations: None     Relationship status: None    Intimate partner violence     Fear of current or ex partner: None Emotionally abused: None     Physically abused: None     Forced sexual activity: None   Other Topics Concern    None   Social History Narrative    Patient lives in a home that was built in [address] [place] [address] junior in the bedroom     [place]    Window air conditioning bedroom     Finished basement-dry-no mold or musty smell    [address]  attached to [employer] in the winter months     Home is smoke free     Does not live near wooded are or open fields         Dog x5 ([place], [place], [place], [place], and [place]) and there allowed in the bedroom         Caffeine: soda occasional                     Coffee is decaf 1 cup daily     Chocolate consumed rarely         Patient lives with wife and two children       Current Medications:     Current Outpatient Medications   Medication Sig Dispense Refill    aspirin (ECOTRIN LOW STRENGTH) 81 mg EC tablet Take 162 mg by mouth daily       atorvastatin (LIPITOR) 40 mg tablet Take 1 tablet (40 mg total) by mouth daily 90 tablet 1    cyclobenzaprine (FLEXERIL) 10 mg tablet Take 1 tablet (10 mg total) by mouth daily at bedtime TAKE 1/2 TO 1 TABLET AT BEDTIME AS NEEDED 90 tablet 0    diazepam (VALIUM) 5 mg tablet Take 1 tablet (5 mg total) by mouth daily at bedtime as needed for anxiety for up to 5 doses 20 tablet 0    gabapentin (NEURONTIN) 300 mg capsule Take 1 capsule (300 mg total) by mouth daily 90 capsule 1    lisinopril-hydrochlorothiazide (PRINZIDE,ZESTORETIC) 10-12 5 MG per tablet Take 1 tablet by mouth daily 90 tablet 1    metoprolol tartrate (LOPRESSOR) 25 mg tablet Take 1 tablet (25 mg total) by mouth daily 90 tablet 3    nitroglycerin (NITROSTAT) 0 4 mg SL tablet Place 1 tablet (0 4 mg total) under the tongue every 5 (five) minutes as needed for chest pain 30 tablet 0    pantoprazole (PROTONIX) 40 mg tablet Take 1 tablet (40 mg total) by mouth daily 90 tablet 1    venlafaxine (EFFEXOR) 37 5 mg tablet Take 1 tablet (37 5 mg total) by mouth daily 90 tablet 1    B COMPLEX VITAMINS ER PO Take by mouth      diclofenac sodium (VOLTAREN) 1 % Apply 2 g topically 4 (four) times a day as needed (shoulder pain) 100 g 0    EPINEPHrine (EPIPEN) 0 3 mg/0 3 mL SOAJ Inject 0 3 mL (0 3 mg total) into a muscle once for 1 dose 1 each 3    Iron, Ferrous Sulfate, 142 (45 Fe) MG TBCR Take by mouth      Krill Oil 1000 MG CAPS Take by mouth      Nutritional Supplements (OSTEO-MULTIVITAMINS/MINERALS PO) Take 1 capsule by mouth daily      NYSTATIN powder 3 (three) times a week        No current facility-administered medications for this visit  Allergies: Allergies   Allergen Reactions    Nuts Anaphylaxis    Peanut Oil Anaphylaxis    Other      Soy  Patient can not have an NGT due to bariatric surgery    Penicillins     Shellfish Allergy      Betadine is Okay   Soy Isoflavones     Adhesive [Medical Tape] Rash      Physical Exam:     /86   Pulse 77   Temp 98 4 °F (36 9 °C)   Resp 18   Ht 5' 10" (1 778 m)   Wt 99 3 kg (219 lb)   SpO2 93%   BMI 31 42 kg/m²     Physical Exam  Vitals signs reviewed  Constitutional:       Appearance: He is well-developed  He is obese  HENT:      Head: Normocephalic and atraumatic  Right Ear: Hearing, tympanic membrane, ear canal and external ear normal       Left Ear: Hearing, tympanic membrane, ear canal and external ear normal    Eyes:      General: Lids are normal       Conjunctiva/sclera: Conjunctivae normal       Pupils: Pupils are equal, round, and reactive to light  Neck:      Musculoskeletal: Normal range of motion  Thyroid: No thyromegaly  Cardiovascular:      Rate and Rhythm: Normal rate and regular rhythm  Pulses: Normal pulses  Heart sounds: Normal heart sounds  No murmur  Pulmonary:      Effort: Pulmonary effort is normal       Breath sounds: Normal breath sounds     Abdominal:      General: Bowel sounds are normal       Palpations: Abdomen is soft  Tenderness: There is no abdominal tenderness  Musculoskeletal:      Right shoulder: He exhibits decreased range of motion, crepitus and pain  He exhibits no swelling, normal pulse and normal strength  Right lower leg: No edema  Left lower leg: No edema  Comments: R shoulder pain with arm hyperabducted, with adduction, and with internal rotation  Positive pour test    Lymphadenopathy:      Cervical: No cervical adenopathy  Skin:     General: Skin is warm and dry  Capillary Refill: Capillary refill takes less than 2 seconds  Neurological:      General: No focal deficit present  Mental Status: He is alert and oriented to person, place, and time  Motor: Motor function is intact  Deep Tendon Reflexes:      Reflex Scores:       Patellar reflexes are 0 on the right side and 0 on the left side  Psychiatric:         Attention and Perception: Attention normal          Mood and Affect: Mood normal          Speech: Speech normal          Behavior: Behavior normal  Behavior is cooperative  Thought Content:  Thought content normal          Cognition and Memory: Cognition normal          Judgment: Judgment normal           NU Bose  [facility]

## 2023-07-29 NOTE — PROGRESS NOTE ADULT - ASSESSMENT
73 female with OA knee now s/p right TKR 7/26.    1. OA knee  Right TKR  opiates prn + bowel regimen.   Physical Therapy evaluation. Plan for [facility]  Medically stable for discharge to [facility]   DVT prophylaxis as per Orthopedic protocol.     2. Parkinson's   Sinemet    3. Restless legs  Pramipexole    4. Hypothyroid  Synthroid    5. Depression  Celexa    6. HTN  Toprol    7. Urostomy  outpt urology follow up    8. DVT ppx  Eliquis    d/w patient

## 2023-08-02 ENCOUNTER — EMERGENCY (EMERGENCY)
Facility: HOSPITAL | Age: 73
LOS: 1 days | Discharge: NURSING, REHAB OR LONG-TERM CARE | End: 2023-08-02
Attending: EMERGENCY MEDICINE | Admitting: EMERGENCY MEDICINE
Payer: MEDICARE

## 2023-08-02 VITALS
HEIGHT: 63 IN | OXYGEN SATURATION: 99 % | RESPIRATION RATE: 20 BRPM | HEART RATE: 69 BPM | TEMPERATURE: 98 F | WEIGHT: 240.08 LBS | SYSTOLIC BLOOD PRESSURE: 112 MMHG | DIASTOLIC BLOOD PRESSURE: 58 MMHG

## 2023-08-02 DIAGNOSIS — Z98.890 OTHER SPECIFIED POSTPROCEDURAL STATES: Chronic | ICD-10-CM

## 2023-08-02 DIAGNOSIS — Z98.89 OTHER SPECIFIED POSTPROCEDURAL STATES: Chronic | ICD-10-CM

## 2023-08-02 DIAGNOSIS — Z46.89 ENCOUNTER FOR FITTING AND ADJUSTMENT OF OTHER SPECIFIED DEVICES: ICD-10-CM

## 2023-08-02 DIAGNOSIS — Z90.710 ACQUIRED ABSENCE OF BOTH CERVIX AND UTERUS: Chronic | ICD-10-CM

## 2023-08-02 DIAGNOSIS — Z96.659 PRESENCE OF UNSPECIFIED ARTIFICIAL KNEE JOINT: Chronic | ICD-10-CM

## 2023-08-02 DIAGNOSIS — M25.561 PAIN IN RIGHT KNEE: Chronic | ICD-10-CM

## 2023-08-02 DIAGNOSIS — D49.4 NEOPLASM OF UNSPECIFIED BEHAVIOR OF BLADDER: Chronic | ICD-10-CM

## 2023-08-02 DIAGNOSIS — Z96.651 PRESENCE OF RIGHT ARTIFICIAL KNEE JOINT: Chronic | ICD-10-CM

## 2023-08-02 PROCEDURE — 99284 EMERGENCY DEPT VISIT MOD MDM: CPT

## 2023-08-02 NOTE — ED PROVIDER NOTE - PATIENT PORTAL LINK FT
You can access the FollowMyHealth Patient Portal offered by [facility] by registering at the following website: [email or web address]. By joining [employer]’s FollowMyHealth portal, you will also be able to view your health information using other applications (apps) compatible with our system.

## 2023-08-02 NOTE — ED PROVIDER NOTE - MUSCULOSKELETAL, MLM
Right leg in full leg cast from groin to ankle.  Distal pulses and sensation intact.  There is no broken skin or irritation noted in the posterior aspect of the cast at the level of the ankle.

## 2023-08-02 NOTE — ED PROVIDER NOTE - CLINICAL SUMMARY MEDICAL DECISION MAKING FREE TEXT BOX
73-year-old female with history of right quadriceps tendon repair last week by Dr. Carmen sent by ambulance from rehab for evaluation of right posterior ankle irritation from the cast that was placed after surgery.  Patient states she mentioned it to orthopedics prior to being discharged but it was not addressed.  Denies other injury or symptom.    Physical exam reveals no skin breakdown or severe constriction by cast.  Plan is we will get Ortho evaluation for possible cast adjustment.

## 2023-08-02 NOTE — CONSULT NOTE ADULT - SUBJECTIVE AND OBJECTIVE BOX
Patient was in [facility] and c/o cylinder cast rubbing into back of heel s/p Right quadriceps repair. She presents to ED for cast trimming.

## 2023-08-02 NOTE — ED ADULT NURSE NOTE - OBJECTIVE STATEMENT
74 y/o female received axo4 via wheelchair c/o pain to her right ankle area due to her cast being too tight. pt had cast placed earlier in the week by ortho.

## 2023-08-02 NOTE — ED ADULT NURSE NOTE - NSFALLHARMRISKINTERV_ED_ALL_ED
Assistance OOB with selected safe patient handling equipment if applicable/Communicate risk of Fall with Harm to all staff, patient, and family/Monitor gait and stability/Provide patient with walking aids/Provide visual cue: red socks, yellow wristband, yellow gown, etc/Reinforce activity limits and safety measures with patient and family/Bed in lowest position, wheels locked, appropriate side rails in place/Call bell, personal items and telephone in reach/Instruct patient to call for assistance before getting out of bed/chair/stretcher/Non-slip footwear applied when patient is off stretcher/[place] to call system/Physically safe environment - no spills, clutter or unnecessary equipment/Purposeful Proactive Rounding/Room/bathroom lighting operational, light cord in reach

## 2023-08-02 NOTE — ED PROVIDER NOTE - NSICDXPASTMEDICALHX_GEN_ALL_CORE_FT
PAST MEDICAL HISTORY:  2019 novel coronavirus disease (COVID-19)     Anxiety and depression     Bladder cancer     Chronic venous insufficiency     Class 3 severe obesity with body mass index (BMI) of 40.0 to 44.9 in adult     Extensor mechanism malalignment of knee     Glaucoma     Hyperlipidemia     Hypertension     Hypothyroid     Osteoarthritis     Parkinsons disease diagnosed 5 years ago    Presence of right artificial knee joint     Ureterostomy status     Wheelchair dependent

## 2023-08-02 NOTE — ED PROVIDER NOTE - ATTESTATION, MLM
Dedra presents to [facility] today for a surgical consult with Dr. Jackson  regarding left breast pain. She is accompanied by her daughter who is serving as her  today per her request.   RN assessment and EMR update.  Patient met with Dr. Jackson .  See dictation for details of visit and follow up plan.  RN time 12 mins.  
I have reviewed and confirmed nurses' notes for patient's medications, allergies, medical history, and surgical history.

## 2023-08-02 NOTE — ED PROVIDER NOTE - OBJECTIVE STATEMENT
73-year-old female with history of right quadriceps tendon repair last week by Dr. Carmen sent by ambulance from rehab for evaluation of right posterior ankle irritation from the cast that was placed after surgery.  Patient states she mentioned it to orthopedics prior to being discharged but it was not addressed.  Denies other injury or symptom.

## 2023-08-02 NOTE — CONSULT NOTE ADULT - ASSESSMENT
On Exam: RLE Cylinder Cast CDI, distal posterior edge irritating heel. No skin breakdown.    Cast trimmed, padded with abd pad and tape along distal posterior edge.

## 2023-08-02 NOTE — ED PROVIDER NOTE - NSICDXPASTSURGICALHX_GEN_ALL_CORE_FT
PAST SURGICAL HISTORY:  H/O total knee replacement, right     History of urostomy     Neoplasm of bladder     Right knee pain     S/P  x 2    S/P cardiac catheterization     S/P knee replacement bilateral 1 week apart     S/P knee surgery s/p cement spacer due to bacterial infection 2017    S/P revision of total knee, right     S/P WADE-BSO

## 2023-08-02 NOTE — ED PROVIDER NOTE - CARE PROVIDER_API CALL
Soy Oh  Orthopaedic Surgery  [address], [address]  [place] [address]  Phone: [phone]  Fax: [phone]  Established Patient  Follow Up Time: 1-3 Days

## 2023-08-02 NOTE — ED PROVIDER NOTE - NSFOLLOWUPINSTRUCTIONS_ED_ALL_ED_FT
Cast Care    WHAT YOU NEED TO KNOW:    What do I need to know about cast care? Cast care will help the cast dry and harden correctly, and then protect it until it comes off. Your cast may need up to 48 hours to dry and harden completely. Even after your cast hardens, it can be damaged.    How do I care for my cast while it hardens?    Protect the cast. Do not put weight on the cast. Do not bend, lean on, or hit the cast with anything. Use the palms of your hands when you move the cast. Do not use your fingers. Your fingers may leave marks on the cast as it dries.    Change positions often. Change your position every 2 hours to help the cast dry faster. Prop your cast on something soft, such as a pillow, to prevent a flat area on your cast.    Keep the cast dry. Tie plastic trash bags around your cast to keep it dry while you bathe. You may use a blow dryer on cool or the lowest heat setting to dry your cast if it gets wet. Do not use a high heat setting, because you may burn your skin. Certain casts can get wet. Ask if you have a waterproof cast.  How do I care for my cast after it hardens?    Check your cast every day. Contact your healthcare provider if you notice any cracks, dents, holes, or flaking on your cast.    Keep your cast clean and dry. Cover your cast with a towel when you eat. You may have a small piece of cast that can be removed to check on incisions under your cast. Make sure the small piece of cast is kept tightly closed. If your cast gets dirty, use a mild detergent and a damp washcloth to wipe off the outside of your cast. Continue to cover your cast with trash bags to keep it dry while you bathe.    Care for the edges of your cast. Cover the cast edges to keep them smooth. Use 4 inch pieces of waterproof tape. Place one end of the tape under the inside edge of your cast and fold it over to the outside surface. Overlap tape strips until the edges are completely covered. Change the tape as directed. Do not pull or repair any of the padding from inside the cast. This could cause blisters and sores on the skin under your cast.    Keep weight off your cast. Do not let anyone push down or lean on your cast. This may cause it to break.    Do not use sharp objects. Do not use a sharp or pointed object to scratch under your cast. This may cause wounds that can get infected, or you may lose the item inside the cast. If your skin itches, blow cool air under the cast. You may also gently scratch your skin outside the cast with a cloth.  When should I seek immediate care?    Your cast breaks or gets damaged.    You see drainage, or your cast is stained or smells bad.    Your skin turns blue or pale.    Your skin tingles, burns, or is cold or numb.    You have severe pain that is getting worse and does not go away after you take pain medicine.    Your limb swells, or your cast looks or feels tighter than it was before.  When should I contact my healthcare provider?    Something falls into your cast and gets stuck.    You have itching, pain, burning, or weakness where you have the cast.    You have a fever.    You have sores, blisters, or breaks on the skin around the edges of the cast.    You have questions or concerns about your condition or care.  CARE AGREEMENT:    You have the right to help plan your care. Learn about your health condition and how it may be treated. Discuss treatment options with your healthcare providers to decide what care you want to receive. You always have the right to refuse treatment.

## 2023-08-02 NOTE — ED PROVIDER NOTE - PROGRESS NOTE DETAILS
Seen by Ortho LITO Rodarte who adjusted patient's cast for comfort.  Cleared patient for discharge back to rehab.

## 2023-08-08 ENCOUNTER — APPOINTMENT (OUTPATIENT)
Dept: ORTHOPEDIC SURGERY | Facility: CLINIC | Age: 73
End: 2023-08-08
Payer: MEDICARE

## 2023-08-08 VITALS
HEART RATE: 76 BPM | DIASTOLIC BLOOD PRESSURE: 74 MMHG | BODY MASS INDEX: 43.59 KG/M2 | HEIGHT: 63 IN | WEIGHT: 246 LBS | SYSTOLIC BLOOD PRESSURE: 112 MMHG

## 2023-08-08 PROCEDURE — 29365 APPL CYLINDER CAST: CPT | Mod: 58,RT

## 2023-08-08 PROCEDURE — 73562 X-RAY EXAM OF KNEE 3: CPT | Mod: RT

## 2023-08-08 PROCEDURE — 99024 POSTOP FOLLOW-UP VISIT: CPT

## 2023-08-08 PROCEDURE — 73502 X-RAY EXAM HIP UNI 2-3 VIEWS: CPT | Mod: 26,RT

## 2023-08-08 NOTE — HISTORY OF PRESENT ILLNESS
[___ Weeks Post Op] : [unfilled] weeks post op [Chills] : no chills [Constipation] : no constipation [Diarrhea] : no diarrhea [Dysuria] : no dysuria [Fever] : no fever [Nausea] : no nausea [Vomiting] : no vomiting [Clean/Dry/Intact] : clean, dry and intact [Healed] : healed [Erythema] : not erythematous [Discharge] : absent of discharge [Swelling] : not swollen [Dehiscence] : not dehisced [Neuro Intact] : an unremarkable neurological exam [Vascular Intact] : ~T peripheral vascular exam normal [Negative Armand's] : maneuvers demonstrated a negative Armand's sign [Doing Well] : is doing well [Excellent Pain Control] : has excellent pain control [No Sign of Infection] : is showing no signs of infection [de-identified] : S/P Rev of Right TKR DOS 7/26/23 [de-identified] : Patient is a 73-year-old female who presents today for an evaluation of her right knee.  She is status post redo extensor mechanism repair of the right knee using Marlex mesh and arthroflex graft.  Currently she is in a cylinder cast and in rehab.  She states that she has been elevating her leg as advised and that the pain level is a 0 on a scale of 1-10.  She does state however that there has been rubbing on the posterior aspect of her ankle/heel due to the type of cast she is currently using.  She denies any new or recent injury. [de-identified] : On physical examination of the right knee.  Patient is currently in a cylinder cast.  The cast is intact with no evidence of abuse.  It is well-padded throughout the cast particularly at the proximal and distal portions of the cast.  The cast was then removed.  And a full evaluation was noted.  There is a well-healed surgical scar noted with sutures in place.  There is no redness heat discharge fever or increased pain and test tenderness indicating any sign of infection.  Range of motion was not tested.  As the patient is to remain in full extension.  There is no palpable defect noted.  There is no evidence of any motor or sensory deficit.  She is neurovascularly intact with good distal pulses and no calf tenderness.  Sutures were then removed in a sterile fashion for which she tolerated it well.  And Steri-Strips were then applied.  An Aquacel dressing was then laid over the entire wound.  As it was rewrapped with generous Webril and an another cylinder cast was applied with a cervical collar for comfort at the distal portion.  The cast was snug but was not tight.  The patient did not complain regarding tightness or discomfort throughout the process.  Toes are still pink and mobile.  Due to the posterior aspect of the heel.  The cylinder cast was notched posteriorly for additional relief.  Patient left the office in a wheelchair satisfied with the new cast. [de-identified] : X-rays of the right knee reveal revision of a right total knee replacement with recent redo of a failed extensor mechanism.  The hardware is in place and shows excellent alignment with fixation. [de-identified] : Status post redo extensor mechanism repair right knee using Marlex mesh and arthroflex graft [de-identified] : Plan for the patient is to continue with the present activities.  An additional cylinder cast was applied onto the right lower extremity.  A generous padded calf was applied with a notch for ease and discomfort in the posterior axis backed of the cast was placed.  The patient is concerned regarding any pressure points.  And the patient was explained that due to the nature of a cylinder cast.  There is the tendency for the cast to cone distally.  Therefore this is why it was explained why it was so heavily padded.  It was explained to the patient other than a cylinder cast a long-leg cast can be applied to ensure no migration of the cast and full protection.  However the patient was adamant and did not want a full leg long-leg cast.  Therefore the cylinder cast was applied.  She is strongly advised to continue with leg elevation along with ice packs/moist heat and anti-inflammatories as needed.  It was also suggested that she may start to ambulate with toe-touch weightbearing and progressive with physical therapy.  She is advised to return back to the office in another month for reevaluation and management.  However if she experiences any redness swelling heat discharge fever coldness change of temperature loss of color increased numbness or tingling calf pain or fever she is to notify the office or the hospital immediately.  I, Dr. Oh, personally performed the evaluation and management services for this established patient who presents today with an orthopedic condition. The evaluation and management includes conducting the conditions and establishing a plan of care.  Today, [facility] was here to assist with the patient in my evaluation and management services for this condition which will be followed going forward.  45 minutes were spent, face to face, in direct consultation with the patient. This includes reviewing the natural history of their Dx., eliciting the history, performing an orthopedic exam, review of the x-ray findings, forming a differential Dx and discussing all treatment options. This Includes both surgical and non-surgical treatments. I also reviewed all the risks and benefits of non-operative & operative Tx options, future impact into orthopedic functions/problems, activity restrictions both at home and at work, and all follow up requirements.

## 2023-08-16 ENCOUNTER — APPOINTMENT (OUTPATIENT)
Dept: ORTHOPEDIC SURGERY | Facility: CLINIC | Age: 73
End: 2023-08-16
Payer: MEDICARE

## 2023-08-16 DIAGNOSIS — Z47.89 ENCOUNTER FOR OTHER ORTHOPEDIC AFTERCARE: ICD-10-CM

## 2023-08-16 DIAGNOSIS — Z51.89 ENCOUNTER FOR OTHER SPECIFIED AFTERCARE: ICD-10-CM

## 2023-08-16 PROCEDURE — 99024 POSTOP FOLLOW-UP VISIT: CPT

## 2023-08-16 NOTE — HISTORY OF PRESENT ILLNESS
[Clean/Dry/Intact] : clean, dry and intact [Erythema] : erythematous [Discharge] : noted to have a ~M discharge [Swelling] : not swollen [Neuro Intact] : an unremarkable neurological exam [Vascular Intact] : ~T peripheral vascular exam normal [Negative Armand's] : maneuvers demonstrated a negative Armand's sign [Doing Well] : is doing well [No Sign of Infection] : is showing no signs of infection [Adequate Pain Control] : has adequate pain control [de-identified] : The patient is a 73 yr old female presents today for evaluation of a wound/cast issue. She is Right quadriceps rupture/revision right total knee replacement performed at [facility] on 7/26/2023 [de-identified] : The patient is currently residing at [facility]. She called the office complaining of pain under the cast near the achilles area of the right ankle. She reports she is experiencing an increased amount of pain 10/10, especially with plantar flexion. She reports a stabbing feeling into a wound present at the right Achilles area. She reports the physician at the facility is unable to see or care for the wound and she will need the cast cut to see the area of concern. She presents in a wheelchair, although she reports she is ambulating with a walker at the facility. The patient presents with her spouse for evaluation.  [de-identified] : A cylinder cast to the right lower extremity is intact maintaining full extension of the right lower extremity as planned. The cast is snug but not tight. Toes are pink and mobile.  A small amount of serosangranous drainage was   noted to the posterior aspect of the right ankle.  [de-identified] : No radiographs were obtained at the visit [de-identified] : The cylinder cast remains intact - a 2in x 1.5 in window was cut out of the posterior aspect of the cast to gain access to the right Achilles wound. The wound was approximately 2 cm x 1 cm superficial opening; red/pink wound bed, scant drainage noted on the cast webroll. The area was cleansed, and bacitracin applied with a dry dressing. She verbalized relief of pain once the window was made into the posterior aspect of the cast.  A wound care consult will see patient at the facility. The patient will continue to participate with physical Therapy as scheduled at the rehabilitation facility. She will continue with toe-touch weightbearing and progress with PT. She will continue to use medications as ordered.  The patient was educated on the signs and symptoms of infection to report. Patient verbalized understanding of all instructions and all of her questions were addressed to her satisfaction. Patient was advised to call this office if she has further questions or concerns.  My cumulative time spent on this patient's visit included: Preparation for the visit, review of the medical records, review of pertinent diagnostic studies, examination and counseling of the patient on the above diagnosis, treatment plan and prognosis, orders of diagnostic tests, medications and/or appropriate procedures and documentation in the medical records of today's visit.  Not including time spent for procedures

## 2023-08-29 ENCOUNTER — APPOINTMENT (OUTPATIENT)
Dept: ORTHOPEDIC SURGERY | Facility: CLINIC | Age: 73
End: 2023-08-29
Payer: MEDICARE

## 2023-08-29 VITALS — SYSTOLIC BLOOD PRESSURE: 112 MMHG | DIASTOLIC BLOOD PRESSURE: 74 MMHG | HEART RATE: 76 BPM

## 2023-08-29 PROCEDURE — 29345 APPLICATION OF LONG LEG CAST: CPT | Mod: RT

## 2023-08-29 PROCEDURE — 99024 POSTOP FOLLOW-UP VISIT: CPT

## 2023-09-06 NOTE — HISTORY OF PRESENT ILLNESS
[___ Months Post Op] : [unfilled] months post op [10] : the patient reports pain that is 10/10 in severity [Slow Progress] : is progressing slowly [Chills] : no chills [Constipation] : no constipation [Diarrhea] : no diarrhea [Dysuria] : no dysuria [Fever] : no fever [Nausea] : no nausea [Vomiting] : no vomiting [Clean/Dry/Intact] : clean, dry and intact [Discharge] : absent of discharge [Healed] : healed [Dehiscence] : not dehisced [Neuro Intact] : an unremarkable neurological exam [Vascular Intact] : ~T peripheral vascular exam normal [Negative Armand's] : maneuvers demonstrated a negative Armand's sign [Doing Well] : is doing well [No Sign of Infection] : is showing no signs of infection [de-identified] : S/P Right quadricep rupture revision Right TKR DOS 7/26/23 [de-identified] : Patient is a 73-year-old female who presents today for her right knee.  She is status post repair of a rerupture quadriceps tendon.  Currently she is in a cylinder cast which was changed recently.  Upon leaving the office with her last cast change.  She states that she was doing very well.  Until recently she noticed that there was a migration of the cylinder cast distally.  With this created an increased discomfort both in the dorsal aspect of the foot as well as the heel.  Patient states that she does have an abrasion/sore at the posterior aspect of the ankle along the Achilles tendon which has been closely monitored at her rehab facility with sterile dressings/padding.  She states that with this migration of her cast this interferes with her posterior wound and is unable to ambulate well.  She states that she feels discomfort in her lower extremity but mostly when trying to stand or ambulate [de-identified] : On physical examination of the right knee.  Patient is currently in a cylinder cast.  The cast is clean dry and intact with no evidence of abuse.  It is fixated well and secured properly with adequate padding especially at the proximal and distal portion.  However there is a cut out on the posterior aspect so wound observation can be performed.  But with this migration there is an irritation on the dorsal aspect of the foot.  The cylinder cast was then removed.  It shows a well-healed surgical scar with no evidence of infection superficial or deep.  There is no redness swelling heat discharge noted.  She is neurovascularly intact with good distal pulses, no calf tenderness and no evidence of impending compartment syndrome.  The posterior wound is noted and is clean dry and intact with some areas of raw skin.  However there is clean edges with no evidence of infection.  There is some slight redness noted.  This area was appropriately cleaned and a new sterile bandage was applied.  A long-leg cast was then applied.  This was well-padded throughout the entire cast especially noted at the proximal and distal portion.  A window was then cut in the posterior aspect at the level of the Achilles tendon.  This is to create a window to observe this pressure wound.  She tolerated the long-leg cast well.  States that it was comfortable without any points of irritation or tightness. [de-identified] : No x-rays were performed [de-identified] : Status post repair/revision of a reruptured right quadriceps on July 26, 2023 [de-identified] : Plan for the patient is to continue with the present activities.  Patient was placed in a long leg cast.  This was determined due to her weightbearing status and the coning of her leg which allows the cylinder cast to migrate.  She was explained that cylinder cast can with time migrate distally due to muscle atrophy as well as improving any residual swelling.  Therefore due to her discomfort and the need to monitor the wound.  A long-leg cast was applied with the window.  This way close monitoring of the wound and be performed in rehab.  She is advised to continue with leg elevation along with Tylenol as needed.  She is to return back to the office in 2 to 3 weeks however if she experiences any increase in pain redness swelling heat discharge fever numbness or tingling she is to notify the office.

## 2023-09-12 ENCOUNTER — APPOINTMENT (OUTPATIENT)
Dept: ORTHOPEDIC SURGERY | Facility: CLINIC | Age: 73
End: 2023-09-12

## 2023-10-03 ENCOUNTER — APPOINTMENT (OUTPATIENT)
Dept: ORTHOPEDIC SURGERY | Facility: CLINIC | Age: 73
End: 2023-10-03
Payer: MEDICARE

## 2023-10-03 VITALS — SYSTOLIC BLOOD PRESSURE: 129 MMHG | DIASTOLIC BLOOD PRESSURE: 74 MMHG | HEART RATE: 72 BPM

## 2023-10-03 PROCEDURE — 99024 POSTOP FOLLOW-UP VISIT: CPT

## 2023-10-24 ENCOUNTER — APPOINTMENT (OUTPATIENT)
Dept: ORTHOPEDIC SURGERY | Facility: CLINIC | Age: 73
End: 2023-10-24
Payer: MEDICARE

## 2023-10-24 VITALS — HEART RATE: 78 BPM | SYSTOLIC BLOOD PRESSURE: 100 MMHG | DIASTOLIC BLOOD PRESSURE: 78 MMHG

## 2023-10-24 PROCEDURE — 73562 X-RAY EXAM OF KNEE 3: CPT | Mod: RT,TC

## 2023-10-24 PROCEDURE — 99024 POSTOP FOLLOW-UP VISIT: CPT

## 2023-12-12 ENCOUNTER — APPOINTMENT (OUTPATIENT)
Dept: ORTHOPEDIC SURGERY | Facility: CLINIC | Age: 73
End: 2023-12-12
Payer: MEDICARE

## 2023-12-12 DIAGNOSIS — S76.119A STRAIN OF UNSPECIFIED QUADRICEPS MUSCLE, FASCIA AND TENDON, INITIAL ENCOUNTER: ICD-10-CM

## 2023-12-12 PROCEDURE — 99214 OFFICE O/P EST MOD 30 MIN: CPT

## 2024-02-13 ENCOUNTER — APPOINTMENT (OUTPATIENT)
Dept: ORTHOPEDIC SURGERY | Facility: CLINIC | Age: 74
End: 2024-02-13

## 2024-06-16 NOTE — H&P ADULT - NSHPROSALLOTHERNEGRD_GEN_ALL_CORE
Pastoral Care Progress Note    2024  Patient: Prema Shepard : 1967  Admission Date & Time: 2024 1645  MRN: [id number] CSN: [id number]       responded to a request by a nurse to provide a Bible to the pt. Pt wanted both the hard-copy and the Bible stick since she did not have her glasses but holding the hard copy would bring comfort. Pt expressed fear and loneliness form absence of her mother.  prayed with pt and will send in-come  to she her. Chaplains remain available.                  [phone]   Clinical Encounter Type   Visited With Patient   Referral From Nurse   Referral To    Evangelical Encounters   Evangelical Needs Prayer        All other review of systems negative, except as noted in HPI

## 2024-07-02 ENCOUNTER — APPOINTMENT (OUTPATIENT)
Dept: ORTHOPEDIC SURGERY | Facility: CLINIC | Age: 74
End: 2024-07-02
Payer: MEDICARE

## 2024-07-02 DIAGNOSIS — Z96.651 PRESENCE OF RIGHT ARTIFICIAL KNEE JOINT: ICD-10-CM

## 2024-07-02 DIAGNOSIS — Z96.652 PRESENCE OF LEFT ARTIFICIAL KNEE JOINT: ICD-10-CM

## 2024-07-02 PROCEDURE — 73560 X-RAY EXAM OF KNEE 1 OR 2: CPT | Mod: 50

## 2024-07-02 PROCEDURE — 99214 OFFICE O/P EST MOD 30 MIN: CPT

## 2024-08-19 ENCOUNTER — APPOINTMENT (OUTPATIENT)
Dept: ORTHOPEDIC SURGERY | Facility: CLINIC | Age: 74
End: 2024-08-19
Payer: MEDICARE

## 2024-08-19 PROCEDURE — 99214 OFFICE O/P EST MOD 30 MIN: CPT

## 2024-08-19 PROCEDURE — 73562 X-RAY EXAM OF KNEE 3: CPT | Mod: LT

## 2024-08-22 ENCOUNTER — RX RENEWAL (OUTPATIENT)
Age: 74
End: 2024-08-22

## 2024-09-03 NOTE — H&P ADULT - PROBLEM SELECTOR PLAN 1
Pediatric Acupuncture Treatment Note    Hemal Thompson, a 5 year old male patient, presents today for a follow-up Acupuncture assessment and treatment. He was referred by CHITRA Bernal CNP for urninary incontinence without sensory awareness. He has been treated for constipation, meatal stenosis (with steroid cream), and has continued accidents throughout the day . He had been working with [facility]ARY Fatuma Yanes prior to her departure from [facility]. Hemal is accompanied to his visit today by his mother, Arina, who along with himself, serves as historian. Younger siblings, Renetta and Richard, are also present.     The assessment has been gathered through chart review, patient and family interview, and acupuncturist's observations.     MAIN COMPLAINT  Mom reports Hemal has a history of urinary incontinence starting around the time he started potty training (March 2020). Mom reports he also has a history of constipation starting around the same time. Mom observed Hemal was running to the bathroom all the time (3 times/20 minute) and would void a high volume. Mom reports daytime is most impactful for him with at baseline 3-4 accidents/day - large accidents which saturate his clothes. At times he can have 10-12 accidents/day.      Mom reports they have tried many things. They are currently working with urology, GI, pelvic floor and integrative medicine. He has been diagnosed with small bladder capacity (around 50ml, should be more around 240ml), an overactive bladder and detrusor sphincter dyssynergy. Mom states they have seen a decrease in the number of accidents in past by doing the MOPS protocol. She shares they just started it again recently. Mom reports they are also listening to Ellie Bolanos's physiological quieting (the pediatric version).      Mom shares constipation has played a role. Had seen improvement since Feb 2022 when did a 6 month MOPS protocol. Now x-ray shows constipation even after  "clean-out and daily enemas. Bms daily 6-7x/week and usually 1-2 x/day. No straining. Just connected with GI MD. Want to get a manometry test. No sensation with voiding. Half the time not even aware when have accidents has happened. No accidents with stooling and reports urge with enemas. Need brain/bladder connection.      Occasional complaints of abdominal discomfort. Usually mornings before leaving for something. Anxious kiddo overall. Used to eat lots of veggies. Good eater overall. Hungry all the time. Likes hot dogs. Wonderings about reflux as previously Hemal described he would \"burp and throw up\" .     Mom shares she feels like sleep may correlate with incontinence issues. Sleep always has been an issue - even as a baby Hemal would be up every 45 min for 14 min. Will be having a sleep study in Dec. Some thought that poor sleep may be due to iron deficiency. Started magnesium citrate capsules nightly before to see if would help. Taking pills - aversion to liquid/oral suspension. Does not seem to be helping with sleep. Started a wind down time before bed that lasts about 45 min. Mom reports this is a quiet time together after younger siblings have gone to bed. Hemal shares he likes wind-down time and his favorite part is doing legos with mom.     UPDATE SINCE LAST VISIT  -Stomach pressure \"about the same\" per Hemal. Mom shares that seemed ramirez bad last night, with observing Hemal applying deep hand pressure on his abdomen frequently. Last week one night was also very bad with him doing this every 10 seconds. Hemal says the Kathi Chews help a little (uses 3/day per Mom).   -Denies nausea.   -Stool sample was sent in - saw a decrease in inflammatory factors.   -Just stopped ex-lax a few days ago. Mom shares seeing daily BM \"snakes\". Not started peanut butter fiber bars yet but have at home.  -Mom reports observing decreased appetite again. Says hungry but not as relentess as usual.  -Urinary accidents about " "the same. Next Monday will have a urodynamic. Will be meeting with both Dr. Cm and Dr. Toriboi. Going to be meeting with teacher to discuss planning for the school year around this. Last year came up with natural prompts to help with going.   -Sleep - good. Using mouth tape on most of the night. Sleep hasn't been as good as was initially when started using. Migrated to the floor from bed. Might try to switch bed with sister to see if makes a difference.   -A lot of energy during the day - \"too much\" bottled up energy per Mom last week or two.   -Some episodes of growth pains - last night \"knees were hurting\" - just painful. Woke up at 1am crying in pain with growing pains.   -Mood-wise.. up and down but overall mainly improved. Sassy talk to Daddy. Frustration on both sides. Ramirez if doesn't feel heard or listened to.   -Excited about school and looking forward to doing  \"works\" in school.     10 TRADITIONAL CHINESE MEDICINE QUESTIONS  Cold/ Heat:  Hemal reports \"medium\". Feels hot. Back of neck/body hot. Not sure of extremeities.    Sweat:  No sweats w/o exertion.     Headaches/Body aches:  Tummy aches sometimes. Knees hurt - both. Dont remember why. Still kindo of hurts last few weeks.     Chest/Abdomen:  Allergies. Often congested, ramirez in winter time. Family hx of hearing loss 35-40 yrs on dad side. Stay congested. Allergic to cats, dogs. (Air filter).  Salt cave tried once helpful.     Digestion:  See Main Complaint     Bowel Movement/Urination:  See Main Complaint.     Hearing/Vision:  vision good. Eagle eyes.     Sleep:  Sleep improved. Usually sleeps through the night. 1-2x/night will wake up and come up at night. Hard to wake up in the morning. Dead to the world.     Energy:  Good energy throughout the day. Tired by end of the day. Brushing teeth hard bc fatigued.     Emotions:  Normally calm and even-keeled. Amazing big brother. Tolerant of little kiddos. Since summer/fall - more frustrated with mom and " "dad. Lots of elevated emotions. Hits a tipping point.     Ob Gyn:  NA    Miscellaneous:  [employer]. Breathing in frustration. Likes to move body - [employer], soccer. Drawing.     TRADITIONAL CHINESE MEDICINE ASSESSMENT   Tongue: Puffy. Crack in the center,. PT red edge and tip.     Pulse:   R cun: thin. tight L cun: tight.   R sheldon: wiry L sheldon: thin, wiry   R chi: tight L chi: tight     Observed: Lots of tightness in RN-14 to RN-12 area which resolved with massages. Cold below umbilicus.     TRADITIONAL CHINESE MEDICINE DIAGNOSIS  Ki qi def, Manuela qi stagnation in the middle/lower/upper sherley, SP qi def, Stomach counterflow qi     TREATMENT  (1.) Abdominal massage RN, ST, SP, LV channels on abdomen. Additionally performed  \"I love you\" massage (familiar to family).     (2.) Acupuncture with Seirin: (B) BL-20, (B) BL-21, (B) BL-23    (3.) Acupressure:   (B) SULMA-5, (B) MANUELA-8, (B) ST-36 (tapping and pressure), (B) KI-10, (B) MANUELA-2  (B) ST-36 to (B) ST-40 with tapping.     (4.) Shoni-kristen treatment:  Stroking down the yang channels of arms  Stroking down the ST/BL channels of legs  Stroking down the BL channel of back  Stroking down ST channel of abdomen to umbilicus  Stroking across shoulders.  Tapping Du20  Tapping Occiput  Tapping sacrum area     POST TREATMENT ASSESSMENT  Hemal tolerated the treatment well.     PLAN  Recommend Hemal return in 4-6 weeks to check on symptoms.    Recommended trying sphinx pose as way to stretch/release abdomen and open chest. RN Care Coordinator Nancy Kearney to follow-up with additional yoga poses to help release and relax abdominal tension.     Hemal reports he does not like doing massage on his own for his belly. Open to warm packs.   If abdominal pain concerns continue or worsen, reach out to GI.   Referral to Laura Lam, Integr[facility] PA, for ped self-hypnosis and other recommendations for anxiety/urinary issues. Hemal has done self-hypnosis with Fatuma " "Que Yanes NP previously. Mom to pursue this option in fall when Laura Lam returns from maternity leave.     MEDICAL HISTORY/PRESENTING PROBLEM  Patient Active Problem List   Diagnosis    Encopresis, nonorganic origin    Fecal smearing    Other urinary incontinence    Urinary frequency    History of constipation     Lab Results   Component Value Date    WBC 9.8 10/26/2022     Lab Results   Component Value Date    RBC 4.74 10/26/2022     Lab Results   Component Value Date    HGB 13.2 10/26/2022     Lab Results   Component Value Date    HCT 38.0 10/26/2022     No components found for: \"MCT\"  Lab Results   Component Value Date    MCV 80 10/26/2022     Lab Results   Component Value Date    MCH 27.8 10/26/2022     Lab Results   Component Value Date    MCHC 34.7 10/26/2022     Lab Results   Component Value Date    RDW 11.9 10/26/2022     Lab Results   Component Value Date     10/26/2022     No current outpatient medications on file.     No current facility-administered medications for this visit.       Thank you for this consultation. Please do not hesitate to contact me with any questions or concerns.     Risks and benefits of acupuncture were discussed with patient and Mom. Consent for treatment was given by both.    Duration of Visit: 60 Minutes    Marybeth Horner L.Ac., [facility]  Licensed Acupuncturist   P[facility]  " On admission, patient meets sepsis criteria with fever, leukocytosis, tachycardia, with likely infectious source of non-purulent left lower extremity cellulitis vs. less likely Urostomy infection.  - UA positive, but less reliable in the setting of urostomy, pending urine culture  - s/p zosyn x1, Vanc x1, NS 1.6L bolus x1 in ED  - Start Cefazolin (lower suspicion for MRSA given non-purulent cellulitis)  - c/w maintenance IVF: NS 1L @ 100cc/hr  - Tylenol for pain control and fever PRN  - erythema marked with pen, monitor for response to treatment  - monitor fever curve  - f/u blood cultures x2, urine culture On admission, patient meets sepsis criteria with fever, leukocytosis, tachycardia, with likely infectious source of non-purulent left lower extremity cellulitis vs. less likely Urostomy infection.  - UA positive, but less reliable in the setting of urostomy, pending urine culture  - s/p zosyn x1, Vanc x1, NS 1.6L bolus x1 in ED  - c/w empiric Vancomycin and Zosyn in the setting of high rectal temperature, leukocytosis, extensive cellulitis, and possible urostomy infection  - c/w maintenance IVF: NS 1L @ 100cc/hr  - Tylenol for pain control and fever PRN  - erythema marked with pen, monitor for response to treatment  - monitor fever curve, vitals q4h  - f/u blood cultures x2, urine culture On admission, patient meets sepsis criteria with fever, leukocytosis, tachycardia, with likely infectious source of non-purulent left lower extremity cellulitis vs. less likely Urostomy infection.  - UA positive, but less reliable in the setting of urostomy, pending urine culture  - s/p zosyn x1, Vanc x1, NS 1.6L bolus x1 in ED  - c/w empiric Vancomycin and Zosyn in the setting of high rectal temperature, leukocytosis, extensive cellulitis, and possible urostomy infection   - c/w maintenance IVF: NS 1L @ 100cc/hr  - Tylenol for pain control and fever PRN  - erythema marked with pen, monitor for response to treatment  - monitor fever curve, vitals q4h  - f/u blood cultures x2, urine culture

## 2024-09-11 ENCOUNTER — RX RENEWAL (OUTPATIENT)
Age: 74
End: 2024-09-11

## 2024-10-01 ENCOUNTER — APPOINTMENT (OUTPATIENT)
Dept: ORTHOPEDIC SURGERY | Facility: CLINIC | Age: 74
End: 2024-10-01
Payer: MEDICARE

## 2024-10-01 DIAGNOSIS — Z96.652 PRESENCE OF LEFT ARTIFICIAL KNEE JOINT: ICD-10-CM

## 2024-10-01 DIAGNOSIS — S76.119A STRAIN OF UNSPECIFIED QUADRICEPS MUSCLE, FASCIA AND TENDON, INITIAL ENCOUNTER: ICD-10-CM

## 2024-10-01 PROCEDURE — 73562 X-RAY EXAM OF KNEE 3: CPT | Mod: LT

## 2024-10-01 PROCEDURE — 99214 OFFICE O/P EST MOD 30 MIN: CPT

## 2024-10-01 RX ORDER — TRAMADOL HYDROCHLORIDE 50 MG/1
50 TABLET, COATED ORAL
Qty: 20 | Refills: 0 | Status: ACTIVE | COMMUNITY
Start: 2024-10-01 | End: 1900-01-01

## 2024-10-01 NOTE — REASON FOR VISIT
[Follow-Up Visit] : a follow-up visit for [Family Member] : family member [FreeTextEntry2] : s/p Revision Right TKR 7/26/23

## 2024-10-01 NOTE — HISTORY OF PRESENT ILLNESS
[de-identified] : Patient is a 74-year-old female who presents today for an evaluation regarding her right knee.  Originally the patient underwent a right total knee replacement which was followed by a revision due to a patella fracture.  Which was followed again by an additional surgery due to a ruptured quadriceps and finally an additional surgery to rerupturing the quadriceps.  Her last surgery was performed on July 26, 2023.  She has been followed with us and been a close eye monitor throughout this course.  She returns today complaining of pain discomfort and a marked weakness within her right knee.  She states that she is receiving physical therapy but states that there has been little to no progress.  She states that she is unable to ambulate and is reached limited to her activities and is currently using a wheelchair.  She has great difficulty ambulating as well as standing.  And has been using assistive device both for transport and mobility.  She states that she is unable to fully extend the knee and feels that if when trying to stand or ambulate her knee buckle.  She is able to flex her knee therefore sitting in the does not elicit an increased discomfort but is mostly with any weightbearing activities.  She does take pain medications as needed.  And tries to lead an active life.  She denies any history of new or recent injury and presents today for an evaluation regarding her right knee.  Patient is also status post left total knee replacement.  She also states that due to these transfers and attempting to walk using the assistive device and the inability to ambulate well has led to an increased discomfort in her upper extremities as well.  currently the pain can be a 10 on a scale of 1-10.

## 2024-10-01 NOTE — DISCUSSION/SUMMARY
[de-identified] : After thorough history full examination and review of current and previous x-rays.  It is noted that the patient does have a knee replacement which is in good alignment and fixation.  However she does still present with a marked weakness in her right lower extremity along with the inability to fully extend the knee.  She was explained that this was due to the multiple injuries sustained to the quadriceps and attempted repair.  She is currently receiving for therapy and advised to continue with therapy for isometric range of motion but mostly strengthening exercises.  She is encouraged to continue with ice packs/moist heat and anti-inflammatories as needed.  A prescription for tramadol was sent to the pharmacy without incident and advised to use this accordingly.  Currently the patient has a great difficulty in ambulating and is currently in a wheelchair.  As she has great difficulty with ambulation as well as transfers.  It is advised that she continue with the use of specialized equipment.  This would include but not limited to a motorized wheelchair.  She was fully evaluated and assessed and is a clear candidate for this device to help her on her daily activities.  Another request through her insurance company will be made to have this authorized for that the patient may continue with the motorized wheelchair.  It is advised that she return back to the office in another 2 to 3 months for reevaluation and management.  However if she experiences any increase in pain swelling stiffness discomfort or worsening of her condition.  To notify the office sooner  35 minutes were spent, face to face, in direct consultation with the patient. This includes reviewing the natural history of their Dx., eliciting the history, performing an orthopedic exam, review of the x-ray findings, forming a differential Dx and discussing all treatment options. This Includes both surgical and non-surgical treatments. I also reviewed all the risks and benefits of non-operative & operative Tx options, future impact into orthopedic functions/problems, activity restrictions both at home and at work, and all follow up requirements.

## 2025-01-19 NOTE — OCCUPATIONAL THERAPY INITIAL EVALUATION ADULT - ADAPTIVE EQUIPMENT NEEDED
yes/seat riser and grab bars for toileting s/p Left TKR 02/24/16 Summary : 70-year-old male, Leighton Crawley, here for evaluation of a PEG tube placement.  PE: NAD, MMM, lungs CTA, heart no MRG, no abdominal tenderness, no CVA tenderness, no LE edema, peg tube is intact  ddx: peg tube sizing issue,   Plan: PA adjusted the PEG tube, no concern for infection, Summary : 70-year-old male, Leighton Crawley, here for evaluation of a PEG tube placement.  PE: NAD, MMM, lungs CTA, heart no MRG, no abdominal tenderness, no CVA tenderness, no LE edema, peg tube is intact  ddx: peg tube sizing issue,   Plan: PA adjusted the PEG tube, no concern for infection,    Emili: Patient is a 70-year-old who presents to the emergency department for follow-up after G-tube placement.  Patient had G-tube placed for feeding and then was told to come back today.  Patient says there is no pain and is things are functioning well.  Will discuss with the physician who did the PEG tube procedure as to what is necessary.  They say the collar needs to be loosened and they came down and loosen the collar.  Patient will be discharged to follow-up with his surgeons. yes/seat riser and grab bars for toileting

## 2025-02-25 NOTE — DIETITIAN INITIAL EVALUATION ADULT. - ORAL INTAKE PTA
Quality 226: Preventive Care And Screening: Tobacco Use: Screening And Cessation Intervention: Patient screened for tobacco use and is an ex/non-smoker
good
Detail Level: Detailed
Quality 431: Preventive Care And Screening: Unhealthy Alcohol Use - Screening: Patient not identified as an unhealthy alcohol user when screened for unhealthy alcohol use using a systematic screening method
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
## 2025-07-25 RX ORDER — AMOXICILLIN 500 MG/1
500 CAPSULE ORAL
Qty: 12 | Refills: 2 | Status: ACTIVE | COMMUNITY
Start: 2025-07-25 | End: 1900-01-01

## (undated) DEVICE — POSITIONER STRAP ARMBOARD VELCRO TS-30

## (undated) DEVICE — SPECIMEN CONTAINER PET

## (undated) DEVICE — ELCTR AQUAMANTYS BIPOLAR SEALER 6.0

## (undated) DEVICE — SYM-STRYKER TPS CONSOLE: Type: DURABLE MEDICAL EQUIPMENT

## (undated) DEVICE — SUT VICRYL PLUS 1 27" CP UNDYED

## (undated) DEVICE — HOOD FLYTE STRYKER HELMET SHIELD

## (undated) DEVICE — TOURNIQUET CUFF 34" DUAL PORT W PLC

## (undated) DEVICE — SOLIDIFIER CANN EXPRESS 3K

## (undated) DEVICE — CANISTER SUCTION LID GUARD 3000CC

## (undated) DEVICE — BUR STRYKER FLUTED ROUND 3MM

## (undated) DEVICE — SOL IRR POUR NS 0.9% 500ML

## (undated) DEVICE — POSITIONER STIRRUP STRAP W SLIP RING 19X3.5"

## (undated) DEVICE — SYR LUER LOK 50CC

## (undated) DEVICE — DRSG CURITY GAUZE SPONGE 4 X 4" 12-PLY

## (undated) DEVICE — PACK TOTAL KNEE

## (undated) DEVICE — SUT VICRYL PLUS 0 27" CT-1 UNDYED

## (undated) DEVICE — CRYO/CUFF GRAVITY COOLER KNEE LARGE

## (undated) DEVICE — DRILL BIT SYNTHES ORTHO QC 3.2X145MM

## (undated) DEVICE — PREP CHLORAPREP HI-LITE ORANGE 26ML

## (undated) DEVICE — SYR LUER LOK 20CC

## (undated) DEVICE — SOL IRR BAG NS 0.9% 3000ML

## (undated) DEVICE — DRSG PICO NPWT 4 X 16"

## (undated) DEVICE — DRSG COMBINE 5X9"

## (undated) DEVICE — HANDPIECE INTERPULSE W MULTI TIP

## (undated) DEVICE — SUCTION YANKAUER BULBOUS TIP NO VENT

## (undated) DEVICE — BETADINE SCRUB BRUSH

## (undated) DEVICE — WOUND IRR SURGIPHOR

## (undated) DEVICE — VENODYNE/SCD SLEEVE CALF MEDIUM

## (undated) DEVICE — LAP PAD 18 X 18"

## (undated) DEVICE — GOWN XL W TOWEL

## (undated) DEVICE — DRAIN JACKSON PRATT 3 SPRING RESERVOIR W 15FR PVC DRAIN

## (undated) DEVICE — SUT TAPE 1.3MM WH/BL 36.6MM RV CT NDL

## (undated) DEVICE — POSITIONER FOAM HEAD CRADLE (PINK)

## (undated) DEVICE — SUT VICRYL PLUS 2-0 27" CP-1 UNDYED

## (undated) DEVICE — DRILL BIT MICROAIRE TWIST 2.4MM X 127MM

## (undated) DEVICE — SUT MONOSOF 3-0 30" C-16

## (undated) DEVICE — NDL SPINAL 18G X 3.5" (PINK)

## (undated) DEVICE — ELCTR STRYKER NEPTUNE SMOKE EVACUATION PENCIL (GREEN)

## (undated) DEVICE — SOL IRR BAG NS 0.9% 1000ML

## (undated) DEVICE — ELCTR ROCKER SWITCH PENCIL BLUE 10FT

## (undated) DEVICE — SUT MONOCRYL 3-0 18" PS-1

## (undated) DEVICE — CONTAINER SPECIMEN 4OZ

## (undated) DEVICE — SOL IRR POUR H2O 1500ML

## (undated) DEVICE — ZIMMER BACTISURE WOUND LAVAGE 1000ML

## (undated) DEVICE — SYR LUER LOK 10CC

## (undated) DEVICE — GLV 8 PROTEXIS (BLUE)

## (undated) DEVICE — SUT RETRIEVER S&N LAVENDER

## (undated) DEVICE — WOUND IRR IRRISEPT W 0.5 CHG

## (undated) DEVICE — DRILL BIT SYNTHES ORTHO 4.3MM

## (undated) DEVICE — DRSG DERMABOND PRINEO 60CM

## (undated) DEVICE — DRSG XEROFORM 1 X 8"

## (undated) DEVICE — BAG SPONGE COUNTER EZ

## (undated) DEVICE — KIT OPTIVAC CEMENT MIXER 40GM

## (undated) DEVICE — WARMING BLANKET UPPER ADULT